# Patient Record
Sex: MALE | Race: WHITE | NOT HISPANIC OR LATINO | Employment: OTHER | URBAN - METROPOLITAN AREA
[De-identification: names, ages, dates, MRNs, and addresses within clinical notes are randomized per-mention and may not be internally consistent; named-entity substitution may affect disease eponyms.]

---

## 2023-03-02 LAB
ALANINE AMINOTRANSFERASE (SGPT) (U/L) IN SER/PLAS: 26 U/L (ref 10–52)
ALBUMIN (G/DL) IN SER/PLAS: 4.1 G/DL (ref 3.4–5)
ALBUMIN (MG/L) IN URINE: 80.2 MG/L
ALBUMIN/CREATININE (UG/MG) IN URINE: 80.7 UG/MG CRT (ref 0–30)
ALKALINE PHOSPHATASE (U/L) IN SER/PLAS: 74 U/L (ref 33–136)
ANION GAP IN SER/PLAS: 13 MMOL/L (ref 10–20)
ASPARTATE AMINOTRANSFERASE (SGOT) (U/L) IN SER/PLAS: 29 U/L (ref 9–39)
BILIRUBIN TOTAL (MG/DL) IN SER/PLAS: 0.3 MG/DL (ref 0–1.2)
CALCIUM (MG/DL) IN SER/PLAS: 9.6 MG/DL (ref 8.6–10.6)
CARBON DIOXIDE, TOTAL (MMOL/L) IN SER/PLAS: 23 MMOL/L (ref 21–32)
CHLORIDE (MMOL/L) IN SER/PLAS: 108 MMOL/L (ref 98–107)
CHOLESTEROL (MG/DL) IN SER/PLAS: 132 MG/DL (ref 0–199)
CHOLESTEROL IN HDL (MG/DL) IN SER/PLAS: 45.6 MG/DL
CHOLESTEROL/HDL RATIO: 2.9
CREATININE (MG/DL) IN SER/PLAS: 1.12 MG/DL (ref 0.5–1.3)
CREATININE (MG/DL) IN URINE: 99.4 MG/DL (ref 20–370)
GFR MALE: 71 ML/MIN/1.73M2
GLUCOSE (MG/DL) IN SER/PLAS: 99 MG/DL (ref 74–99)
LDL: 69 MG/DL (ref 0–99)
POTASSIUM (MMOL/L) IN SER/PLAS: 4.1 MMOL/L (ref 3.5–5.3)
PROTEIN TOTAL: 6.9 G/DL (ref 6.4–8.2)
SODIUM (MMOL/L) IN SER/PLAS: 140 MMOL/L (ref 136–145)
THYROTROPIN (MIU/L) IN SER/PLAS BY DETECTION LIMIT <= 0.05 MIU/L: 2.84 MIU/L (ref 0.44–3.98)
TRIGLYCERIDE (MG/DL) IN SER/PLAS: 89 MG/DL (ref 0–149)
UREA NITROGEN (MG/DL) IN SER/PLAS: 18 MG/DL (ref 6–23)
VLDL: 18 MG/DL (ref 0–40)

## 2023-03-28 ENCOUNTER — TELEPHONE (OUTPATIENT)
Dept: PRIMARY CARE | Facility: CLINIC | Age: 68
End: 2023-03-28
Payer: MEDICARE

## 2023-04-24 PROBLEM — L57.0 ACTINIC KERATOSIS OF SCALP: Status: RESOLVED | Noted: 2023-04-24 | Resolved: 2023-04-24

## 2023-04-24 PROBLEM — K21.9 GERD (GASTROESOPHAGEAL REFLUX DISEASE): Status: ACTIVE | Noted: 2023-04-24

## 2023-04-24 PROBLEM — R05.9 COUGH: Status: RESOLVED | Noted: 2023-04-24 | Resolved: 2023-04-24

## 2023-04-24 PROBLEM — M54.16 LUMBAR RADICULOPATHY: Status: ACTIVE | Noted: 2023-04-24

## 2023-04-24 PROBLEM — I20.89 ATYPICAL ANGINA (CMS-HCC): Status: RESOLVED | Noted: 2023-04-24 | Resolved: 2023-04-24

## 2023-04-24 PROBLEM — E11.9 DIABETES (MULTI): Status: ACTIVE | Noted: 2023-04-24

## 2023-04-24 PROBLEM — R19.8 IRREGULAR BOWEL HABITS: Status: ACTIVE | Noted: 2023-04-24

## 2023-04-24 PROBLEM — M54.12 CERVICAL RADICULOPATHY, CHRONIC: Status: ACTIVE | Noted: 2023-04-24

## 2023-04-24 PROBLEM — M17.9 OSTEOARTHRITIS OF KNEE: Status: ACTIVE | Noted: 2023-04-24

## 2023-04-24 PROBLEM — R19.4 CHANGE IN BOWEL HABITS: Status: ACTIVE | Noted: 2023-04-24

## 2023-04-24 PROBLEM — H66.009 ACUTE SUPPURATIVE OTITIS MEDIA: Status: RESOLVED | Noted: 2023-04-24 | Resolved: 2023-04-24

## 2023-04-24 PROBLEM — M25.569 KNEE PAIN: Status: RESOLVED | Noted: 2023-04-24 | Resolved: 2023-04-24

## 2023-04-24 PROBLEM — K63.5 COLON POLYP: Status: ACTIVE | Noted: 2023-04-24

## 2023-04-24 PROBLEM — M54.31 SCIATICA OF RIGHT SIDE: Status: ACTIVE | Noted: 2023-04-24

## 2023-04-24 PROBLEM — M25.50 JOINT PAIN: Status: RESOLVED | Noted: 2023-04-24 | Resolved: 2023-04-24

## 2023-04-24 PROBLEM — M54.2 NECK PAIN: Status: RESOLVED | Noted: 2023-04-24 | Resolved: 2023-04-24

## 2023-04-24 PROBLEM — R10.9 ABDOMINAL CRAMPING: Status: RESOLVED | Noted: 2023-04-24 | Resolved: 2023-04-24

## 2023-04-24 PROBLEM — R15.9 FECAL INCONTINENCE: Status: ACTIVE | Noted: 2023-04-24

## 2023-04-24 PROBLEM — E78.5 HYPERLIPIDEMIA: Status: ACTIVE | Noted: 2023-04-24

## 2023-04-24 PROBLEM — R91.1 LUNG NODULE: Status: ACTIVE | Noted: 2023-04-24

## 2023-04-24 PROBLEM — M54.50 LOW BACK PAIN: Status: RESOLVED | Noted: 2023-04-24 | Resolved: 2023-04-24

## 2023-04-24 PROBLEM — R19.8 ANAL DISCHARGE: Status: RESOLVED | Noted: 2023-04-24 | Resolved: 2023-04-24

## 2023-04-24 PROBLEM — R79.89 ELEVATED TSH: Status: ACTIVE | Noted: 2023-04-24

## 2023-04-24 PROBLEM — M25.561 ARTHRALGIA OF RIGHT KNEE: Status: RESOLVED | Noted: 2023-04-24 | Resolved: 2023-04-24

## 2023-04-24 PROBLEM — M25.559 HIP PAIN, ACUTE: Status: RESOLVED | Noted: 2023-04-24 | Resolved: 2023-04-24

## 2023-04-24 RX ORDER — INSULIN LISPRO 100 [IU]/ML
INJECTION, SOLUTION INTRAVENOUS; SUBCUTANEOUS
COMMUNITY
End: 2023-08-29 | Stop reason: ALTCHOICE

## 2023-05-02 ENCOUNTER — HOSPITAL ENCOUNTER (OUTPATIENT)
Dept: DATA CONVERSION | Facility: HOSPITAL | Age: 68
End: 2023-05-02
Attending: PAIN MEDICINE | Admitting: PAIN MEDICINE
Payer: MEDICARE

## 2023-05-02 DIAGNOSIS — M54.12 RADICULOPATHY, CERVICAL REGION: ICD-10-CM

## 2023-06-01 LAB
ALBUMIN (G/DL) IN SER/PLAS: 4 G/DL (ref 3.4–5)
ANION GAP IN SER/PLAS: 14 MMOL/L (ref 10–20)
CALCIUM (MG/DL) IN SER/PLAS: 9.1 MG/DL (ref 8.6–10.3)
CARBON DIOXIDE, TOTAL (MMOL/L) IN SER/PLAS: 21 MMOL/L (ref 21–32)
CHLORIDE (MMOL/L) IN SER/PLAS: 108 MMOL/L (ref 98–107)
CREATININE (MG/DL) IN SER/PLAS: 1.46 MG/DL (ref 0.5–1.3)
GFR MALE: 51 ML/MIN/1.73M2
GLUCOSE (MG/DL) IN SER/PLAS: 220 MG/DL (ref 74–99)
PHOSPHATE (MG/DL) IN SER/PLAS: 4 MG/DL (ref 2.5–4.9)
POTASSIUM (MMOL/L) IN SER/PLAS: 4.2 MMOL/L (ref 3.5–5.3)
SODIUM (MMOL/L) IN SER/PLAS: 139 MMOL/L (ref 136–145)
UREA NITROGEN (MG/DL) IN SER/PLAS: 29 MG/DL (ref 6–23)

## 2023-07-03 DIAGNOSIS — K21.9 GASTROESOPHAGEAL REFLUX DISEASE, UNSPECIFIED WHETHER ESOPHAGITIS PRESENT: ICD-10-CM

## 2023-07-03 RX ORDER — CIMETIDINE 800 MG/1
TABLET, FILM COATED ORAL
Qty: 90 TABLET | Refills: 3 | Status: SHIPPED | OUTPATIENT
Start: 2023-07-03 | End: 2024-03-19 | Stop reason: SDUPTHER

## 2023-07-05 LAB
ALBUMIN (G/DL) IN SER/PLAS: 4.2 G/DL (ref 3.4–5)
ANION GAP IN SER/PLAS: 14 MMOL/L (ref 10–20)
CALCIUM (MG/DL) IN SER/PLAS: 9.2 MG/DL (ref 8.6–10.3)
CARBON DIOXIDE, TOTAL (MMOL/L) IN SER/PLAS: 19 MMOL/L (ref 21–32)
CHLORIDE (MMOL/L) IN SER/PLAS: 108 MMOL/L (ref 98–107)
CREATININE (MG/DL) IN SER/PLAS: 1.44 MG/DL (ref 0.5–1.3)
GFR MALE: 52 ML/MIN/1.73M2
GLUCOSE (MG/DL) IN SER/PLAS: 219 MG/DL (ref 74–99)
PHOSPHATE (MG/DL) IN SER/PLAS: 3.4 MG/DL (ref 2.5–4.9)
POTASSIUM (MMOL/L) IN SER/PLAS: 4.2 MMOL/L (ref 3.5–5.3)
SODIUM (MMOL/L) IN SER/PLAS: 137 MMOL/L (ref 136–145)
UREA NITROGEN (MG/DL) IN SER/PLAS: 32 MG/DL (ref 6–23)

## 2023-07-17 RX ORDER — CANDESARTAN 16 MG/1
1 TABLET ORAL DAILY
COMMUNITY
Start: 2018-10-08 | End: 2023-08-10

## 2023-07-17 RX ORDER — EMPAGLIFLOZIN 10 MG/1
1 TABLET, FILM COATED ORAL DAILY
COMMUNITY
Start: 2023-05-02 | End: 2023-10-11

## 2023-07-17 RX ORDER — PEN NEEDLE, DIABETIC 31 GX5/16"
NEEDLE, DISPOSABLE MISCELLANEOUS
COMMUNITY
Start: 2023-06-08 | End: 2023-09-07

## 2023-07-17 RX ORDER — SOLIFENACIN SUCCINATE 5 MG/1
1 TABLET, FILM COATED ORAL DAILY
COMMUNITY
Start: 2018-10-08 | End: 2023-09-25

## 2023-07-17 RX ORDER — METFORMIN HYDROCHLORIDE 1000 MG/1
1 TABLET ORAL EVERY 12 HOURS
COMMUNITY
Start: 2019-01-11 | End: 2023-11-08

## 2023-07-17 RX ORDER — SIMVASTATIN 10 MG/1
1 TABLET, FILM COATED ORAL DAILY
COMMUNITY
Start: 2019-09-11 | End: 2023-10-03

## 2023-07-18 DIAGNOSIS — G47.00 INSOMNIA, UNSPECIFIED TYPE: Primary | ICD-10-CM

## 2023-07-18 RX ORDER — AMITRIPTYLINE HYDROCHLORIDE 25 MG/1
25 TABLET, FILM COATED ORAL NIGHTLY
Qty: 90 TABLET | Refills: 3 | Status: SHIPPED | OUTPATIENT
Start: 2023-07-18 | End: 2024-03-19 | Stop reason: SDUPTHER

## 2023-08-10 LAB
ALANINE AMINOTRANSFERASE (SGPT) (U/L) IN SER/PLAS: 20 U/L (ref 10–52)
ALBUMIN (G/DL) IN SER/PLAS: 4 G/DL (ref 3.4–5)
ALKALINE PHOSPHATASE (U/L) IN SER/PLAS: 63 U/L (ref 33–136)
ANION GAP IN SER/PLAS: 13 MMOL/L (ref 10–20)
ASPARTATE AMINOTRANSFERASE (SGOT) (U/L) IN SER/PLAS: 26 U/L (ref 9–39)
BILIRUBIN TOTAL (MG/DL) IN SER/PLAS: 0.3 MG/DL (ref 0–1.2)
CALCIUM (MG/DL) IN SER/PLAS: 9.2 MG/DL (ref 8.6–10.3)
CARBON DIOXIDE, TOTAL (MMOL/L) IN SER/PLAS: 25 MMOL/L (ref 21–32)
CHLORIDE (MMOL/L) IN SER/PLAS: 107 MMOL/L (ref 98–107)
CHOLESTEROL (MG/DL) IN SER/PLAS: 127 MG/DL (ref 0–199)
CHOLESTEROL IN HDL (MG/DL) IN SER/PLAS: 37.8 MG/DL
CHOLESTEROL/HDL RATIO: 3.4
CREATININE (MG/DL) IN SER/PLAS: 1.47 MG/DL (ref 0.5–1.3)
ESTIMATED AVERAGE GLUCOSE FOR HBA1C: 174 MG/DL
GFR MALE: 51 ML/MIN/1.73M2
GLUCOSE (MG/DL) IN SER/PLAS: 100 MG/DL (ref 74–99)
HEMOGLOBIN A1C/HEMOGLOBIN TOTAL IN BLOOD: 7.7 %
LDL: 68 MG/DL (ref 0–99)
PHOSPHATE (MG/DL) IN SER/PLAS: 3.4 MG/DL (ref 2.5–4.9)
POTASSIUM (MMOL/L) IN SER/PLAS: 4.2 MMOL/L (ref 3.5–5.3)
PROTEIN TOTAL: 7 G/DL (ref 6.4–8.2)
SODIUM (MMOL/L) IN SER/PLAS: 141 MMOL/L (ref 136–145)
THYROTROPIN (MIU/L) IN SER/PLAS BY DETECTION LIMIT <= 0.05 MIU/L: 3.27 MIU/L (ref 0.44–3.98)
TRIGLYCERIDE (MG/DL) IN SER/PLAS: 108 MG/DL (ref 0–149)
UREA NITROGEN (MG/DL) IN SER/PLAS: 24 MG/DL (ref 6–23)
VLDL: 22 MG/DL (ref 0–40)

## 2023-08-24 ENCOUNTER — HOSPITAL ENCOUNTER (OUTPATIENT)
Dept: DATA CONVERSION | Facility: HOSPITAL | Age: 68
End: 2023-08-24
Attending: PAIN MEDICINE | Admitting: PAIN MEDICINE
Payer: MEDICARE

## 2023-08-24 DIAGNOSIS — M54.12 RADICULOPATHY, CERVICAL REGION: ICD-10-CM

## 2023-08-25 PROBLEM — D18.01 HEMANGIOMA OF SKIN AND SUBCUTANEOUS TISSUE: Status: ACTIVE | Noted: 2023-05-24

## 2023-08-25 PROBLEM — D22.5 MELANOCYTIC NEVI OF TRUNK: Status: ACTIVE | Noted: 2023-05-24

## 2023-08-25 PROBLEM — C44.519 BASAL CELL CARCINOMA OF SKIN OF OTHER PART OF TRUNK: Status: ACTIVE | Noted: 2023-05-24

## 2023-08-25 PROBLEM — L81.4 OTHER MELANIN HYPERPIGMENTATION: Status: ACTIVE | Noted: 2023-05-24

## 2023-08-25 PROBLEM — D48.5 NEOPLASM OF UNCERTAIN BEHAVIOR OF SKIN: Status: ACTIVE | Noted: 2023-05-24

## 2023-08-25 PROBLEM — L91.8 OTHER HYPERTROPHIC DISORDERS OF THE SKIN: Status: ACTIVE | Noted: 2023-05-24

## 2023-08-25 RX ORDER — BLOOD-GLUCOSE METER
KIT MISCELLANEOUS
COMMUNITY
Start: 2019-07-29 | End: 2024-03-12 | Stop reason: ENTERED-IN-ERROR

## 2023-08-29 ENCOUNTER — LAB (OUTPATIENT)
Dept: LAB | Facility: LAB | Age: 68
End: 2023-08-29
Payer: MEDICARE

## 2023-08-29 ENCOUNTER — OFFICE VISIT (OUTPATIENT)
Dept: PRIMARY CARE | Facility: CLINIC | Age: 68
End: 2023-08-29
Payer: MEDICARE

## 2023-08-29 VITALS
HEIGHT: 66 IN | RESPIRATION RATE: 17 BRPM | DIASTOLIC BLOOD PRESSURE: 82 MMHG | WEIGHT: 201 LBS | BODY MASS INDEX: 32.3 KG/M2 | TEMPERATURE: 97.5 F | HEART RATE: 76 BPM | SYSTOLIC BLOOD PRESSURE: 140 MMHG

## 2023-08-29 DIAGNOSIS — Q60.0 CONGENITAL ABSENCE OF RIGHT KIDNEY: ICD-10-CM

## 2023-08-29 DIAGNOSIS — E11.9 TYPE 2 DIABETES MELLITUS WITHOUT COMPLICATION, WITHOUT LONG-TERM CURRENT USE OF INSULIN (MULTI): ICD-10-CM

## 2023-08-29 DIAGNOSIS — Z00.00 ROUTINE GENERAL MEDICAL EXAMINATION AT A HEALTH CARE FACILITY: ICD-10-CM

## 2023-08-29 DIAGNOSIS — Z12.5 PROSTATE CANCER SCREENING: ICD-10-CM

## 2023-08-29 DIAGNOSIS — Z00.00 MEDICARE ANNUAL WELLNESS VISIT, SUBSEQUENT: Primary | ICD-10-CM

## 2023-08-29 LAB — PROSTATE SPECIFIC AG (NG/ML) IN SER/PLAS: 0.63 NG/ML (ref 0–4)

## 2023-08-29 PROCEDURE — 90677 PCV20 VACCINE IM: CPT | Performed by: FAMILY MEDICINE

## 2023-08-29 PROCEDURE — 36415 COLL VENOUS BLD VENIPUNCTURE: CPT

## 2023-08-29 PROCEDURE — 99214 OFFICE O/P EST MOD 30 MIN: CPT | Performed by: FAMILY MEDICINE

## 2023-08-29 PROCEDURE — G0009 ADMIN PNEUMOCOCCAL VACCINE: HCPCS | Performed by: FAMILY MEDICINE

## 2023-08-29 PROCEDURE — G0103 PSA SCREENING: HCPCS

## 2023-08-29 PROCEDURE — G0439 PPPS, SUBSEQ VISIT: HCPCS | Performed by: FAMILY MEDICINE

## 2023-08-29 ASSESSMENT — ENCOUNTER SYMPTOMS
CARDIOVASCULAR NEGATIVE: 1
CONSTITUTIONAL NEGATIVE: 1
NEUROLOGICAL NEGATIVE: 1
EYES NEGATIVE: 1
MUSCULOSKELETAL NEGATIVE: 1
HEMATOLOGIC/LYMPHATIC NEGATIVE: 1
ALLERGIC/IMMUNOLOGIC NEGATIVE: 1
GASTROINTESTINAL NEGATIVE: 1
ENDOCRINE NEGATIVE: 1
RESPIRATORY NEGATIVE: 1
PSYCHIATRIC NEGATIVE: 1

## 2023-08-29 ASSESSMENT — ACTIVITIES OF DAILY LIVING (ADL)
TAKING_MEDICATION: INDEPENDENT
DOING_HOUSEWORK: INDEPENDENT
MANAGING_FINANCES: INDEPENDENT
BATHING: INDEPENDENT
GROCERY_SHOPPING: INDEPENDENT
DRESSING: INDEPENDENT

## 2023-08-29 ASSESSMENT — PATIENT HEALTH QUESTIONNAIRE - PHQ9
1. LITTLE INTEREST OR PLEASURE IN DOING THINGS: NOT AT ALL
2. FEELING DOWN, DEPRESSED OR HOPELESS: NOT AT ALL
SUM OF ALL RESPONSES TO PHQ9 QUESTIONS 1 AND 2: 0

## 2023-08-29 NOTE — PROGRESS NOTES
"Subjective   Reason for Visit: Dalton Champion is an 68 y.o. male here for a Medicare Wellness visit.     Past Medical, Surgical, and Family History reviewed and updated in chart.    Reviewed all medications by prescribing practitioner or clinical pharmacist (such as prescriptions, OTCs, herbal therapies and supplements) and documented in the medical record.    HPI    Patient Care Team:  Remy Pisano DO as PCP - General  Remy Pisano DO [facility] PCP [facility] Attributed Provider     Review of Systems    Objective   Vitals:  /82   Pulse 76   Temp 36.4 °C (97.5 °F)   Resp 17   Ht 1.676 m (5' 6\")   Wt 91.2 kg (201 lb)   BMI 32.44 kg/m²       Physical Exam    Assessment/Plan   Problem List Items Addressed This Visit       Congenital absence of right kidney     Other Visit Diagnoses       Prostate cancer screening    -  Primary    Relevant Orders    Prostate Specific Antigen               "

## 2023-08-29 NOTE — PROGRESS NOTES
Subjective   Patient ID: Dalton Champion is a 68 y.o. male who presents for Med Refill (6 month med check. Pt states he has been doing well. 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 ).  HPI    Review of Systems   Constitutional: Negative.    HENT: Negative.     Eyes: Negative.    Respiratory: Negative.     Cardiovascular: Negative.    Gastrointestinal: Negative.    Endocrine: Negative.    Genitourinary: Negative.    Musculoskeletal: Negative.    Skin: Negative.    Allergic/Immunologic: Negative.    Neurological: Negative.    Hematological: Negative.    Psychiatric/Behavioral: Negative.         Objective   Physical Exam  Vitals and nursing note reviewed.   Constitutional:       Appearance: Normal appearance.   HENT:      Head: Normocephalic and atraumatic.      Nose: Nose normal.      Mouth/Throat:      Pharynx: Oropharynx is clear.   Eyes:      Extraocular Movements: Extraocular movements intact.      Conjunctiva/sclera: Conjunctivae normal.      Pupils: Pupils are equal, round, and reactive to light.   Neck:      Vascular: No carotid bruit.   Cardiovascular:      Rate and Rhythm: Normal rate and regular rhythm.      Pulses: Normal pulses.      Heart sounds: Normal heart sounds.   Pulmonary:      Effort: Pulmonary effort is normal. No respiratory distress.      Breath sounds: Normal breath sounds. No wheezing, rhonchi or rales.   Abdominal:      General: Abdomen is flat. Bowel sounds are normal. There is no distension.      Palpations: Abdomen is soft.      Tenderness: There is no abdominal tenderness.      Hernia: No hernia is present.   Musculoskeletal:         General: No swelling or tenderness. Normal range of motion.      Cervical back: Normal range of motion and neck supple. No tenderness.   Lymphadenopathy:      Cervical: No cervical adenopathy.   Skin:     General: Skin is warm and dry.      Capillary Refill: Capillary refill takes less than 2 seconds.    Neurological:      General: No focal deficit present.      Mental Status: He is alert and oriented to person, place, and time.      Cranial Nerves: No cranial nerve deficit.   Psychiatric:         Attention and Perception: Attention and perception normal.         Mood and Affect: Mood normal.         Behavior: Behavior normal.         Thought Content: Thought content normal.         Judgment: Judgment normal.         Assessment/Plan   Problem List Items Addressed This Visit       Diabetes (CMS/[facility])     [facility] reviewed and follow up yearly         Congenital absence of right kidney     Other Visit Diagnoses       Medicare annual wellness visit, subsequent    -  Primary    Prostate cancer screening        Relevant Orders    Prostate Specific Antigen (Completed)    Routine general medical examination at a health care facility        Relevant Orders    Pneumococcal conjugate vaccine, 20-valent, adult (PREVNAR 20) (Completed)

## 2023-08-30 ENCOUNTER — TELEPHONE (OUTPATIENT)
Dept: PRIMARY CARE | Facility: CLINIC | Age: 68
End: 2023-08-30
Payer: MEDICARE

## 2023-09-14 PROBLEM — Z79.4 LONG-TERM INSULIN USE (MULTI): Status: ACTIVE | Noted: 2023-09-14

## 2023-09-14 RX ORDER — PANTOPRAZOLE SODIUM 40 MG/1
40 TABLET, DELAYED RELEASE ORAL 2 TIMES DAILY
COMMUNITY
Start: 2015-12-22 | End: 2023-10-17 | Stop reason: ALTCHOICE

## 2023-09-14 RX ORDER — SILDENAFIL 25 MG/1
TABLET, FILM COATED ORAL
COMMUNITY
End: 2023-10-17 | Stop reason: ALTCHOICE

## 2023-09-14 RX ORDER — LANCETS
EACH MISCELLANEOUS
COMMUNITY
Start: 2021-03-30 | End: 2024-03-12 | Stop reason: ENTERED-IN-ERROR

## 2023-09-14 RX ORDER — LIRAGLUTIDE 6 MG/ML
INJECTION SUBCUTANEOUS
COMMUNITY
End: 2023-10-17 | Stop reason: ALTCHOICE

## 2023-09-14 RX ORDER — INSULIN LISPRO 100 [IU]/ML
INJECTION, SUSPENSION SUBCUTANEOUS
COMMUNITY
End: 2023-10-17 | Stop reason: ALTCHOICE

## 2023-09-14 RX ORDER — GABAPENTIN 300 MG/1
300 CAPSULE ORAL 3 TIMES DAILY
COMMUNITY
Start: 2022-09-27 | End: 2023-10-17 | Stop reason: ALTCHOICE

## 2023-10-01 NOTE — OP NOTE
PROCEDURE DETAILS      Postoperative Diagnosis:  cervical radiculopathy   Surgeon: Donta Mckeon  Resident/Fellow/Other Assistant: Rama Killian    Procedure:  Cervical epidural steroid injection under fluoroscopic guidance   Anesthesia: No anesthesiologist associated with this case  Estimated Blood Loss: 0  Findings: None         Operative Report:           Level performed  C7-T1    Operation  Cervical epidural steroid injection.    Surgical indications  The patient is here today to receive a cervical epidural steroid injection to assist with pain.    Operative report  The patient was taken to the procedure room, was placed in a prone positioning. The neck area was prepped and draped sterilely in the normal fashion under fluoroscopic guidance. The above-mentioned space was identified. The skin and subcutaneous tissue  was anesthetized 1% lidocaine. An 18-gauge Tuohy needle was introduced using the normal saline loss-of-resistance technique. Once the loss of resistance had been achieved, the final positioning of the needle was confirmed with negative aspiration of heme  and CSF, and with contrast material injection. Then, the patient received 80 mg of Depo-Medrol diluted into normal saline preservative-free making a total volume of 5 mL. The patient tolerated the procedure well, was taken to the recovery room, allowed  to recover for sufficient amount of time and then was discharged home in stable condition. The patient was advised to [facility] to reassess the improvement and determine the need for repeating injection on an as-needed basis.    Thank you for allowing us to participate in the care of this patient.                        Attestation:   Note Completion:  Attending Attestation I performed the procedure without a resident         Electronic Signatures:  Donta Mckeon)  (Signed 24-Aug-2023 08:36)   Authored: Post-Operative Note, Chart Review, Note  Completion      Last Updated: 24-Aug-2023 08:36 by Donta Mckeon)

## 2023-10-02 NOTE — OP NOTE
PROCEDURE DETAILS    Preoperative Diagnosis:  Cervical radicular pain, M54.12    Postoperative Diagnosis:  Cervical radicular pain, M54.12    Surgeon: Donta Mckeon  Resident/Fellow/Other Assistant: Rama Killian    Procedure:  Cervical epidural steroid injection under fluoroscopic guidance     Anesthesia: No anesthesiologist associated with this case  Estimated Blood Loss: 0  Findings: None         Operative Report:           Level performed  C7-T1    Operation  Cervical epidural steroid injection.    Surgical indications  The patient is here today to receive a cervical epidural steroid injection to assist with pain.    Operative report  The patient was taken to the procedure room, was placed in a prone positioning. The neck area was prepped and draped sterilely in the normal fashion under fluoroscopic guidance. The above-mentioned space was identified. The skin and subcutaneous tissue  was anesthetized 1% lidocaine. An 18-gauge Tuohy needle was introduced using the normal saline loss-of-resistance technique. Once the loss of resistance had been achieved, the final positioning of the needle was confirmed with negative aspiration of heme  and CSF, and with contrast material injection. Then, the patient received 80 mg of Depo-Medrol diluted into normal saline preservative-free making a total volume of 5 mL. The patient tolerated the procedure well, was taken to the recovery room, allowed  to recover for sufficient amount of time and then was discharged home in stable condition. The patient was advised to followup [facility] to reassess the improvement and determine the need for repeating injection on an as-needed basis.    Thank you for allowing us to participate in the care of this patient.                        Attestation:   Note Completion:  Attending Attestation I performed the procedure without a resident         Electronic Signatures:  Donta Mckeon)  (Signed 02-May-2023  08:42)   Authored: Post-Operative Note, Chart Review, Note Completion      Last Updated: 02-May-2023 08:42 by Donta Mckeon)

## 2023-10-11 DIAGNOSIS — E11.9 TYPE 2 DIABETES MELLITUS WITHOUT COMPLICATION, WITHOUT LONG-TERM CURRENT USE OF INSULIN (MULTI): Primary | ICD-10-CM

## 2023-10-11 RX ORDER — EMPAGLIFLOZIN 10 MG/1
10 TABLET, FILM COATED ORAL DAILY
Qty: 90 TABLET | Refills: 3 | Status: SHIPPED | OUTPATIENT
Start: 2023-10-11

## 2023-10-17 ENCOUNTER — OFFICE VISIT (OUTPATIENT)
Dept: ENDOCRINOLOGY | Facility: CLINIC | Age: 68
End: 2023-10-17
Payer: MEDICARE

## 2023-10-17 ENCOUNTER — LAB (OUTPATIENT)
Dept: LAB | Facility: LAB | Age: 68
End: 2023-10-17
Payer: MEDICARE

## 2023-10-17 VITALS
SYSTOLIC BLOOD PRESSURE: 128 MMHG | BODY MASS INDEX: 31.37 KG/M2 | HEIGHT: 68 IN | DIASTOLIC BLOOD PRESSURE: 74 MMHG | WEIGHT: 207 LBS

## 2023-10-17 DIAGNOSIS — Z79.4 TYPE 2 DIABETES MELLITUS WITH OTHER SPECIFIED COMPLICATION, WITH LONG-TERM CURRENT USE OF INSULIN (MULTI): ICD-10-CM

## 2023-10-17 DIAGNOSIS — E11.69 TYPE 2 DIABETES MELLITUS WITH OTHER SPECIFIED COMPLICATION, WITH LONG-TERM CURRENT USE OF INSULIN (MULTI): ICD-10-CM

## 2023-10-17 DIAGNOSIS — E55.9 VITAMIN D DEFICIENCY: ICD-10-CM

## 2023-10-17 DIAGNOSIS — E11.69 TYPE 2 DIABETES MELLITUS WITH OTHER SPECIFIED COMPLICATION, WITH LONG-TERM CURRENT USE OF INSULIN (MULTI): Primary | ICD-10-CM

## 2023-10-17 DIAGNOSIS — Z79.4 TYPE 2 DIABETES MELLITUS WITH OTHER SPECIFIED COMPLICATION, WITH LONG-TERM CURRENT USE OF INSULIN (MULTI): Primary | ICD-10-CM

## 2023-10-17 LAB
25(OH)D3 SERPL-MCNC: 24 NG/ML (ref 30–100)
ALBUMIN SERPL BCP-MCNC: 4 G/DL (ref 3.4–5)
ALP SERPL-CCNC: 61 U/L (ref 33–136)
ALT SERPL W P-5'-P-CCNC: 22 U/L (ref 10–52)
ANION GAP SERPL CALC-SCNC: 12 MMOL/L (ref 10–20)
AST SERPL W P-5'-P-CCNC: 25 U/L (ref 9–39)
BILIRUB SERPL-MCNC: 0.4 MG/DL (ref 0–1.2)
BUN SERPL-MCNC: 30 MG/DL (ref 6–23)
CALCIUM SERPL-MCNC: 9 MG/DL (ref 8.6–10.3)
CHLORIDE SERPL-SCNC: 105 MMOL/L (ref 98–107)
CO2 SERPL-SCNC: 25 MMOL/L (ref 21–32)
CREAT SERPL-MCNC: 1.46 MG/DL (ref 0.5–1.3)
GFR SERPL CREATININE-BSD FRML MDRD: 51 ML/MIN/1.73M*2
GLUCOSE SERPL-MCNC: 65 MG/DL (ref 74–99)
POC FINGERSTICK BLOOD GLUCOSE: 106 MG/DL (ref 70–100)
POC HEMOGLOBIN A1C: 7.8 % (ref 4.2–6.5)
POTASSIUM SERPL-SCNC: 4.6 MMOL/L (ref 3.5–5.3)
PROT SERPL-MCNC: 6.5 G/DL (ref 6.4–8.2)
SODIUM SERPL-SCNC: 137 MMOL/L (ref 136–145)

## 2023-10-17 PROCEDURE — 83036 HEMOGLOBIN GLYCOSYLATED A1C: CPT | Performed by: HOSPITALIST

## 2023-10-17 PROCEDURE — 99214 OFFICE O/P EST MOD 30 MIN: CPT | Performed by: HOSPITALIST

## 2023-10-17 PROCEDURE — 82962 GLUCOSE BLOOD TEST: CPT | Performed by: HOSPITALIST

## 2023-10-17 PROCEDURE — 3051F HG A1C>EQUAL 7.0%<8.0%: CPT | Performed by: HOSPITALIST

## 2023-10-17 PROCEDURE — 1036F TOBACCO NON-USER: CPT | Performed by: HOSPITALIST

## 2023-10-17 PROCEDURE — 82306 VITAMIN D 25 HYDROXY: CPT

## 2023-10-17 PROCEDURE — 3074F SYST BP LT 130 MM HG: CPT | Performed by: HOSPITALIST

## 2023-10-17 PROCEDURE — 36415 COLL VENOUS BLD VENIPUNCTURE: CPT

## 2023-10-17 PROCEDURE — 80053 COMPREHEN METABOLIC PANEL: CPT

## 2023-10-17 PROCEDURE — 3078F DIAST BP <80 MM HG: CPT | Performed by: HOSPITALIST

## 2023-10-17 PROCEDURE — 1125F AMNT PAIN NOTED PAIN PRSNT: CPT | Performed by: HOSPITALIST

## 2023-10-17 PROCEDURE — 4010F ACE/ARB THERAPY RXD/TAKEN: CPT | Performed by: HOSPITALIST

## 2023-10-17 PROCEDURE — 1159F MED LIST DOCD IN RCRD: CPT | Performed by: HOSPITALIST

## 2023-10-17 RX ORDER — BLOOD-GLUCOSE,RECEIVER,CONT
EACH MISCELLANEOUS
Qty: 1 EACH | Refills: 0 | Status: SHIPPED | OUTPATIENT
Start: 2023-10-17 | End: 2023-10-18 | Stop reason: SDUPTHER

## 2023-10-17 RX ORDER — BLOOD-GLUCOSE SENSOR
EACH MISCELLANEOUS
Qty: 6 EACH | Refills: 3 | Status: SHIPPED | OUTPATIENT
Start: 2023-10-17 | End: 2023-10-18 | Stop reason: SDUPTHER

## 2023-10-17 ASSESSMENT — ENCOUNTER SYMPTOMS
CHEST TIGHTNESS: 0
FREQUENCY: 0
SLEEP DISTURBANCE: 0
SORE THROAT: 0
PALPITATIONS: 0
AGITATION: 0
HEADACHES: 0
ARTHRALGIAS: 0
ABDOMINAL PAIN: 0
PHOTOPHOBIA: 0
ABDOMINAL DISTENTION: 0
SHORTNESS OF BREATH: 0
CONSTIPATION: 0
TROUBLE SWALLOWING: 0
NAUSEA: 0
CONSTITUTIONAL NEGATIVE: 1
LIGHT-HEADEDNESS: 0
NERVOUS/ANXIOUS: 0
VOICE CHANGE: 0
VOMITING: 0
TREMORS: 0
DIARRHEA: 0
EYE ITCHING: 0
DYSURIA: 0

## 2023-10-17 NOTE — PROGRESS NOTES
Subjective   Patient ID: Dalton Champion is a 68 y.o. male who presents for Diabetes (Dx DM: around 1999/[facility] DM: father, grandparent, brother/PCP: [facility] /Podiatry: does not see one /Patient testing glucose randomly.).  HPI     PLEASE SEE HPI DETAILS IN AP     Review of Systems   Constitutional: Negative.    HENT:  Negative for sore throat, trouble swallowing and voice change.    Eyes:  Negative for photophobia, itching and visual disturbance.   Respiratory:  Negative for chest tightness and shortness of breath.    Cardiovascular:  Negative for chest pain and palpitations.   Gastrointestinal:  Negative for abdominal distention, abdominal pain, constipation, diarrhea, nausea and vomiting.   Endocrine: Positive for polyuria. Negative for cold intolerance and heat intolerance.   Genitourinary:  Negative for dysuria and frequency.   Musculoskeletal:  Negative for arthralgias.   Skin:  Negative for pallor.   Allergic/Immunologic: Negative for environmental allergies.   Neurological:  Negative for tremors, light-headedness and headaches.        Leg cramps   Psychiatric/Behavioral:  Negative for agitation and sleep disturbance. The patient is not nervous/anxious.        Objective   Physical Exam  Constitutional:       Appearance: Normal appearance.   HENT:      Head: Normocephalic.      Comments: MM dry     Nose: Nose normal.      Mouth/Throat:      Mouth: Mucous membranes are moist.   Eyes:      Extraocular Movements: Extraocular movements intact.   Cardiovascular:      Rate and Rhythm: Normal rate.   Pulmonary:      Effort: Pulmonary effort is normal. No respiratory distress.   Abdominal:      General: There is no distension.   Musculoskeletal:         General: Normal range of motion.      Cervical back: Normal range of motion and neck supple.   Skin:     General: Skin is warm and dry.   Neurological:      Mental Status: He is alert and oriented to person, place, and time.   Psychiatric:         Mood and Affect: Mood  normal.         Assessment/Plan   Diagnoses and all orders for this visit:  Type 2 diabetes mellitus with other specified complication, with long-term current use of insulin (CMS/[facility])  -     POCT glucose manually resulted  -     POCT glycosylated hemoglobin (Hb A1C) manually resulted  -     Comprehensive metabolic panel; Future  -     Albumin , Urine (SPOT); Future  -     blood-glucose sensor (Dexcom G7 Sensor) device; CHANGE EVERY 10 DAYS  -     Dexcom G4 platinum  (Dexcom G7 ) misc; Use as instructed  Vitamin D deficiency  -     Vitamin D 25-Hydroxy,Total (for eval of Vitamin D levels); Future        T2DM , uncontrolled   Dx DM: around 1999    Current regimen:   pioglitzaone 15mg qd   metformin 1000mg BID  Humalog 75/25 30-0-30-0  ( dinner at 6 - takes at 6 : 30 pm , takes empty stomach in morning )    Jardiance 10 mg daily ( started may 2023 - has polyuria and dry mouth )       Misses bkfst, first snack / meal of the day      interval history : GC injection today    she does get occ GC injections in joints, nothing more planned in future   ,   did not bring meter. rarely chceks BG   Low BG symptoms yesterday evening before dinner. 2 times in last month        A1c improved to 7.8 % from 8.5 % and above goal    he mentions he has only one kidney functioning  he has constipation and heartburn for which he takes meds: GLP1 agonist may make it worse     PLAN :     Continue your JARDIANCE/ METFORMIN AND PIOGLITAZONE    INCREASE YOUR WATER INTAKE/ DECREASE YOUR COFFEE INTAKE   CARRY INSULIN WITH YOU WHEN OUTSIDE   TAKE INSULIN ONLY BEFORE EATING , AROUND 15 MIN BEFORE EATING   TAKE 28 UNITS BEFORE FIRST SNACK OR MEAL OF THE DAY AND 28 UNITS BEFORE DINNER   CHECK YOUR SUGAR AT LEAST TWO TIMES A DAY, GIVE 1 WEEK DATA BEFORE NEXT APPOINTMENT.   IF YOU GET STEROID INJECTION IN THE JOINT , TAKE INSULIN 35 UNITS BEFORE FIRST AND 35 UNIST BEFORE DINNER FOR 4 DAYS AFTER THE INJECTIONS.     DO BLOOD WORK TODAY       Discussed CGM dexcom G7 . Discussed MOA, will send to his pharmacy     bring glucometer next visit again   - DIscussed hypoglycemia and the Management of hypoglycemia.   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

## 2023-10-17 NOTE — PATIENT INSTRUCTIONS
Continue your JARDIANCE/ METFORMIN AND PIOGLITAZONE    INCREASE YOUR WATER INTAKE/ DECREASE YOUR COFFEE INTAKE   CARRY INSULIN WITH YOU WHEN OUTSIDE   TAKE INSULIN ONLY BEFORE EATING , AROUND 15 MIN BEFORE EATING   TAKE 28 UNITS BEFORE FIRST SNACK OR MEAL OF THE DAY AND 28 UNITS BEFORE DINNER   CHECK YOUR SUGAR AT LEAST TWO TIMES A DAY, GIVE 1 WEEK DATA BEFORE NEXT APPOINTMENT.   IF YOU GET STEROID INJECTION IN THE JOINT , TAKE INSULIN 35 UNITS BEFORE FIRST AND 35 UNIST BEFORE DINNER FOR 4 DAYS AFTER THE INJECTIONS.     DO BLOOD WORK TODAY

## 2023-10-18 DIAGNOSIS — Z79.4 TYPE 2 DIABETES MELLITUS WITH OTHER SPECIFIED COMPLICATION, WITH LONG-TERM CURRENT USE OF INSULIN (MULTI): ICD-10-CM

## 2023-10-18 DIAGNOSIS — E11.69 TYPE 2 DIABETES MELLITUS WITH OTHER SPECIFIED COMPLICATION, WITH LONG-TERM CURRENT USE OF INSULIN (MULTI): ICD-10-CM

## 2023-10-19 RX ORDER — BLOOD-GLUCOSE SENSOR
EACH MISCELLANEOUS
Qty: 6 EACH | Refills: 3 | Status: SHIPPED | OUTPATIENT
Start: 2023-10-19 | End: 2024-05-02 | Stop reason: DRUGHIGH

## 2023-10-19 RX ORDER — BLOOD-GLUCOSE,RECEIVER,CONT
EACH MISCELLANEOUS
Qty: 1 EACH | Refills: 0 | Status: SHIPPED | OUTPATIENT
Start: 2023-10-19 | End: 2024-05-02 | Stop reason: WASHOUT

## 2023-10-24 ENCOUNTER — TELEPHONE (OUTPATIENT)
Dept: PAIN MEDICINE | Facility: CLINIC | Age: 68
End: 2023-10-24
Payer: MEDICARE

## 2023-10-24 DIAGNOSIS — M54.12 RADICULOPATHY, CERVICAL: Primary | ICD-10-CM

## 2023-10-24 NOTE — TELEPHONE ENCOUNTER
Patient would like a repeat cervical epidural, can you let me know when you put the order in?  thanks

## 2023-11-02 ENCOUNTER — OFFICE VISIT (OUTPATIENT)
Dept: ORTHOPEDIC SURGERY | Facility: CLINIC | Age: 68
End: 2023-11-02
Payer: MEDICARE

## 2023-11-02 DIAGNOSIS — M17.11 PRIMARY OSTEOARTHRITIS OF RIGHT KNEE: Primary | ICD-10-CM

## 2023-11-02 PROCEDURE — 1036F TOBACCO NON-USER: CPT | Performed by: ORTHOPAEDIC SURGERY

## 2023-11-02 PROCEDURE — 4010F ACE/ARB THERAPY RXD/TAKEN: CPT | Performed by: ORTHOPAEDIC SURGERY

## 2023-11-02 PROCEDURE — 3051F HG A1C>EQUAL 7.0%<8.0%: CPT | Performed by: ORTHOPAEDIC SURGERY

## 2023-11-02 PROCEDURE — 3078F DIAST BP <80 MM HG: CPT | Performed by: ORTHOPAEDIC SURGERY

## 2023-11-02 PROCEDURE — 1125F AMNT PAIN NOTED PAIN PRSNT: CPT | Performed by: ORTHOPAEDIC SURGERY

## 2023-11-02 PROCEDURE — 99024 POSTOP FOLLOW-UP VISIT: CPT | Performed by: ORTHOPAEDIC SURGERY

## 2023-11-02 PROCEDURE — 3074F SYST BP LT 130 MM HG: CPT | Performed by: ORTHOPAEDIC SURGERY

## 2023-11-02 PROCEDURE — 20610 DRAIN/INJ JOINT/BURSA W/O US: CPT | Performed by: ORTHOPAEDIC SURGERY

## 2023-11-02 PROCEDURE — 1159F MED LIST DOCD IN RCRD: CPT | Performed by: ORTHOPAEDIC SURGERY

## 2023-11-02 RX ORDER — TRIAMCINOLONE ACETONIDE 40 MG/ML
40 INJECTION, SUSPENSION INTRA-ARTICULAR; INTRAMUSCULAR
Status: COMPLETED | OUTPATIENT
Start: 2023-11-02 | End: 2023-11-02

## 2023-11-02 RX ORDER — LIDOCAINE HYDROCHLORIDE 10 MG/ML
2 INJECTION INFILTRATION; PERINEURAL
Status: COMPLETED | OUTPATIENT
Start: 2023-11-02 | End: 2023-11-02

## 2023-11-02 RX ADMIN — TRIAMCINOLONE ACETONIDE 40 MG: 40 INJECTION, SUSPENSION INTRA-ARTICULAR; INTRAMUSCULAR at 10:51

## 2023-11-02 RX ADMIN — LIDOCAINE HYDROCHLORIDE 2 ML: 10 INJECTION INFILTRATION; PERINEURAL at 10:51

## 2023-11-02 NOTE — PROGRESS NOTES
History of Present Illness:  Patient returns today for an injection with corticosteroid. The patient endorsing knee pain refractory to activity modifications and oral medications.    Physical Examination:  Trace effusion  Tenderness over medial and lateral joint line    Assessment:  Patient with known osteoarthritis of the knee    Plan:  Corticosteroid injection provided, patient tolerated it well. See procedure note.    Injection performed as detailed in the procedure note below.     L Inj/Asp: R knee on 11/2/2023 10:51 AM  Indications: pain  Details: 22 G needle, anteromedial approach  Medications: 2 mL lidocaine 10 mg/mL (1 %); 40 mg triamcinolone acetonide 40 mg/mL  Outcome: tolerated well, no immediate complications  Procedure, treatment alternatives, risks and benefits explained, specific risks discussed. Consent was given by the patient. Immediately prior to procedure a time out was called to verify the correct patient, procedure, equipment, support staff and site/side marked as required. Patient was prepped and draped in the usual sterile fashion.

## 2023-11-08 DIAGNOSIS — Z79.4 TYPE 2 DIABETES MELLITUS WITH OTHER SPECIFIED COMPLICATION, WITH LONG-TERM CURRENT USE OF INSULIN (MULTI): Primary | ICD-10-CM

## 2023-11-08 DIAGNOSIS — E11.69 TYPE 2 DIABETES MELLITUS WITH OTHER SPECIFIED COMPLICATION, WITH LONG-TERM CURRENT USE OF INSULIN (MULTI): Primary | ICD-10-CM

## 2023-11-08 RX ORDER — METFORMIN HYDROCHLORIDE 1000 MG/1
1000 TABLET ORAL EVERY 12 HOURS
Qty: 180 TABLET | Refills: 3 | Status: SHIPPED | OUTPATIENT
Start: 2023-11-08

## 2023-11-30 ENCOUNTER — HOSPITAL ENCOUNTER (OUTPATIENT)
Dept: PAIN MEDICINE | Facility: CLINIC | Age: 68
Discharge: HOME | End: 2023-11-30
Payer: MEDICARE

## 2023-11-30 ENCOUNTER — HOSPITAL ENCOUNTER (OUTPATIENT)
Dept: RADIOLOGY | Facility: HOSPITAL | Age: 68
Discharge: HOME | End: 2023-11-30
Payer: MEDICARE

## 2023-11-30 VITALS
SYSTOLIC BLOOD PRESSURE: 185 MMHG | HEART RATE: 73 BPM | DIASTOLIC BLOOD PRESSURE: 87 MMHG | RESPIRATION RATE: 20 BRPM | TEMPERATURE: 95.6 F | OXYGEN SATURATION: 95 %

## 2023-11-30 DIAGNOSIS — M54.12 RADICULOPATHY, CERVICAL: ICD-10-CM

## 2023-11-30 PROCEDURE — 7100000010 HC PHASE TWO TIME - EACH INCREMENTAL 1 MINUTE: Performed by: PAIN MEDICINE

## 2023-11-30 PROCEDURE — 96372 THER/PROPH/DIAG INJ SC/IM: CPT | Performed by: PAIN MEDICINE

## 2023-11-30 PROCEDURE — 76000 FLUOROSCOPY <1 HR PHYS/QHP: CPT

## 2023-11-30 PROCEDURE — 7100000009 HC PHASE TWO TIME - INITIAL BASE CHARGE: Performed by: PAIN MEDICINE

## 2023-11-30 PROCEDURE — 94760 N-INVAS EAR/PLS OXIMETRY 1: CPT

## 2023-11-30 PROCEDURE — 62321 NJX INTERLAMINAR CRV/THRC: CPT | Performed by: PAIN MEDICINE

## 2023-11-30 PROCEDURE — 2500000004 HC RX 250 GENERAL PHARMACY W/ HCPCS (ALT 636 FOR OP/ED): Performed by: PAIN MEDICINE

## 2023-11-30 RX ORDER — METHYLPREDNISOLONE ACETATE 40 MG/ML
80 INJECTION, SUSPENSION INTRA-ARTICULAR; INTRALESIONAL; INTRAMUSCULAR; SOFT TISSUE ONCE
Status: COMPLETED | OUTPATIENT
Start: 2023-11-30 | End: 2023-11-30

## 2023-11-30 RX ADMIN — METHYLPREDNISOLONE ACETATE 80 MG: 40 INJECTION, SUSPENSION INTRA-ARTICULAR; INTRALESIONAL; INTRAMUSCULAR; SOFT TISSUE at 10:45

## 2023-11-30 ASSESSMENT — PAIN SCALES - GENERAL: PAINLEVEL_OUTOF10: 0 - NO PAIN

## 2023-11-30 ASSESSMENT — PAIN - FUNCTIONAL ASSESSMENT: PAIN_FUNCTIONAL_ASSESSMENT: 0-10

## 2023-11-30 ASSESSMENT — COLUMBIA-SUICIDE SEVERITY RATING SCALE - C-SSRS
1. IN THE PAST MONTH, HAVE YOU WISHED YOU WERE DEAD OR WISHED YOU COULD GO TO SLEEP AND NOT WAKE UP?: NO
6. HAVE YOU EVER DONE ANYTHING, STARTED TO DO ANYTHING, OR PREPARED TO DO ANYTHING TO END YOUR LIFE?: NO
2. HAVE YOU ACTUALLY HAD ANY THOUGHTS OF KILLING YOURSELF?: NO

## 2023-11-30 NOTE — DISCHARGE INSTRUCTIONS
Post-injection instructions:    Your pain may not be gone immediately after the procedure--it usually takes the steroid 3-5 days to start working.   It may take several weeks for the medicine to reach its' full effect.   Pay attention to how much pain relief (what percentage compared to before the procedure) you get and for how long it lasts.     Activity: Avoid strenuous activity for 24 hours. After that return to your normal activity level.     Bandages: Remove after 24 hours     Showering/Bathing: You may shower after bandage is removed     Follow up: CALL OFFICE IN 7 DAYS [phone] LEAVE MESSAGE ABOUT THE RELIEF THAT WAS OBTAINED      Call the doctor immediately: if you notice:     Excessive bleeding from procedure site (brisk bright red bleeding from the site or bleeding that soaks the bandages or does not stop)   Severe headache  Inability to walk, leg or arm weakness or numbness that is worse after the procedure   Uncontrolled pain   New urinary or fecal incontinence   Signs of infection: Fever above 101.5F, redness, swelling, pus or drainage from the site    Epidural Injection    Why is this procedure done?  With an epidural injection, the doctor injects drugs deep into the area around your spinal cord. This is different than epidural anesthesia that is used for surgery or when a woman has a baby. Your spine is a group of bones in your back that protect the nerves in your spinal cord. Problems with your spine can cause swollen nerves in the spinal cord. This swelling leads to pain and can limit movement. In an epidural injection, the doctor may give you a drug to help with swelling and pain.  You may have an epidural injection in different parts of your back, based on where your pain is. For pain in your head or arms, you may have a cervical epidural injection. If your pain is in your upper or middle back, you may get a thoracic epidural injection. For pain in your lower back or legs, you may get a  lumbar epidural injection.    What will the results be?  The treatment may:  Lower pain  Reduce swelling in the nerves  Improve movement  What happens before the procedure?  Your doctor will take your history. Talk to the doctor about:  All the drugs you are taking. Be sure to include all prescription and over-the-counter (OTC) drugs, and herbal supplements. Tell the doctor about any drug allergy. Bring a list of drugs you take with you.  If you have high blood sugar or diabetes. Your drugs may need to be changed.  Any bleeding problems. Be sure to tell your doctor if you are taking any drugs that may cause bleeding. Some of these are warfarin, rivaroxaban, apixaban, ticagrelor, clopidogrel, ketorolac, ibuprofen, naproxen, or aspirin. Certain vitamins and herbs, such as garlic and fish oil, may also add to the risk for bleeding. You may need to stop these drugs as well. Talk to your doctor about them.  Tell the doctor if you are pregnant.  You will not be allowed to drive right away after the procedure. Ask a family member or a friend to drive you home.  What happens during the procedure?  To help the doctor make sure the drugs are being injected in the right place, your doctor may do an x-ray of your spine. Other times your doctor may do a continuous x-ray during the procedure. This is a fluoroscopy. The doctor may also use a colored dye or a contrast dye to check where to inject the drug.  You may be given a drug to help you relax. You may be given a drug to make the area of the injection numb.  The doctor will clean the skin on your back or neck. This helps prevent infection.  The doctor will put a needle through the skin toward your spine. The drug will be injected into a space near the spine.  The needle will be taken out and a bandage will be placed over the injection site.  What happens after the procedure?  Staff will check on you to make sure you are doing well. They will tell you when you can go  home.  What care is needed at home?  Relax on the day of the injection.  Do not drive or run machines for at least 12 hours afterwards.  Apply ice to the injection site. Place an ice pack or a bag of frozen peas wrapped in a towel over the painful part. Never put ice right on the skin. Do not leave the ice on more than 10 to 15 minutes at a time.  It may take a few days before you will feel the effects of the injection.  What follow-up care is needed?  Your doctor may ask you to make visits to the office to check on your progress. Be sure to keep these visits. You may also need to see a physical therapist (PT). The PT will teach you exercises to help you get back your strength and motion. Ask your doctor when you can exercise.  What problems could happen?  Bleeding  Infection (rare)  Headache  Nerve injury  If you have diabetes, your blood sugar can go up after the injection. Check with your doctor if you need more treatment for this.  Last Reviewed Date

## 2023-11-30 NOTE — OP NOTE
Level performed  C7-T1    Operation  Cervical epidural steroid injection.      Surgical indications  The patient is here today to receive a cervical epidural steroid injection to assist with pain.    Operative report  The patient was taken to the procedure room, was placed in a prone positioning. The neck area was prepped and draped sterilely in the normal fashion under fluoroscopic guidance. The above-mentioned space was identified. The skin and subcutaneous tissue was anesthetized 1% lidocaine. An 18-gauge Tuohy needle was introduced using the normal saline loss-of-resistance technique. Once the loss of resistance had been achieved, the final positioning of the needle was confirmed with negative aspiration of heme and CSF. Then, the patient received 80 mg of Depo-Medrol diluted into normal saline preservative-free making a total volume of 5 mL. The patient tolerated the procedure well, was taken to the recovery room, allowed to recover for sufficient amount of time and then was discharged home in stable condition. The patient was advised to followup at the [facility] to reassess the improvement and determine the need for repeating injection on an as-needed basis.    Thank you for allowing us to participate in the care of this patient.      Donta Mckeon MD  Medical director of [facility]   10:46 AM  11/30/23

## 2023-12-07 ENCOUNTER — TELEPHONE (OUTPATIENT)
Dept: PAIN MEDICINE | Facility: CLINIC | Age: 68
End: 2023-12-07

## 2024-02-28 ENCOUNTER — OFFICE VISIT (OUTPATIENT)
Dept: ORTHOPEDIC SURGERY | Facility: CLINIC | Age: 69
End: 2024-02-28
Payer: MEDICARE

## 2024-02-28 DIAGNOSIS — M25.569 KNEE PAIN, UNSPECIFIED CHRONICITY, UNSPECIFIED LATERALITY: Primary | ICD-10-CM

## 2024-02-28 PROBLEM — M17.11 UNILATERAL PRIMARY OSTEOARTHRITIS, RIGHT KNEE: Status: ACTIVE | Noted: 2024-02-28

## 2024-02-28 PROCEDURE — 99214 OFFICE O/P EST MOD 30 MIN: CPT | Performed by: ORTHOPAEDIC SURGERY

## 2024-02-28 PROCEDURE — 1036F TOBACCO NON-USER: CPT | Performed by: ORTHOPAEDIC SURGERY

## 2024-02-28 PROCEDURE — 20610 DRAIN/INJ JOINT/BURSA W/O US: CPT | Mod: RT | Performed by: ORTHOPAEDIC SURGERY

## 2024-02-28 PROCEDURE — 99214 OFFICE O/P EST MOD 30 MIN: CPT | Mod: 57 | Performed by: ORTHOPAEDIC SURGERY

## 2024-02-28 PROCEDURE — 1125F AMNT PAIN NOTED PAIN PRSNT: CPT | Performed by: ORTHOPAEDIC SURGERY

## 2024-02-28 PROCEDURE — 1157F ADVNC CARE PLAN IN RCRD: CPT | Performed by: ORTHOPAEDIC SURGERY

## 2024-02-28 PROCEDURE — 4010F ACE/ARB THERAPY RXD/TAKEN: CPT | Performed by: ORTHOPAEDIC SURGERY

## 2024-02-28 NOTE — PROGRESS NOTES
History of Present Illness:  Patient with known osteoarthritis of the knee who presents today for repeat evaluation.  The patient notes persistent pain as well as some occasional mechanical symptoms.  The patient has tried the following modalities: Rest, ice, corticosteroid injections, bracing, NSAIDs.      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    Review of Systems:   GENERAL: Negative for malaise, significant weight loss, fever  MUSCULOSKELETAL: see HPI  NEURO:  Negative    Physical Examination:  Right Knee:  Skin healthy and intact  No gross swelling or ecchymosis  Alignment: Varus  Effusion: Trace  ROM: 5 degrees to 95 degrees  Crepitance with range of motion  No pain with internal rotation of the hip  Tenderness to palpation: over medial and lateral joint line and with patellar compression     No laxity to valgus stress  No laxity to varus stress  Negative Lachman´s test  Negative posterior drawer test  Mild pain with Joby´s test    Neurovascular exam normal distally  2+ DP pulse and good cap refill    Imaging:  Reviewed, degenerative joint disease noted medial compartment posterior bone fragments    Assessment:  Patient with known osteoarthritis of the right knee    Plan:  We reviewed an evidence-based approach to OA of the knee.  We discussed past treatments as well options for future treatment.  We discussed NSAIDS (risks and benefits), low impact activities.   The patient has failed to improve with multiple non-operative modalities.  There is increasing difficulty with activities of daily living and concern for falls.  The patient is endorsing severe pain and disability.      We had a lengthy discussion regarding total knee arthroplasty including the orthopaedic risks, including but not limited to: stiffness, infection, hematoma, early aseptic loosening, neurologic or vascular injury, clicking, difficulty kneeling and incomplete relief of pain.  We reviewed the  medical risks, including but not limited to: deep venous thrombosis, pulmonary embolism, and cardiovascular/pulmonary issues.    We discussed the anticipated longevity of the implants and potential for revision surgery.  We also discussed anticoagulation, rehabilitation goals, and the hospital course.  We discussed the likelihood of opioid analgesics and risks associated with them.    The next step is to obtain medical risk stratification and encouraged the pre-operative information seminar at the hospital.  We are happy to provide assistance and counseling if the patient has any additional concerns.     Of note, patient only has one kidney.     In a face to face encounter, I evaluated the patient and performed a physical examination, discussed pertinent diagnostic studies if indicated and discussed diagnosis and management strategies with both the patient and physician assistant / nurse practitioner.  I reviewed the PA/NP's note and agree with the documented findings and plan of care.    Discussed with the patient aspiration today based on swelling he like to pursue surgical intervention last hemoglobin A1c was reasonable.  Declined corticosteroid.    L Inj/Asp: R knee on 2/28/2024 5:10 PM  Indications: pain and joint swelling  Details: 18 G needle, superolateral approach  Aspirate: 20 mL clear and yellow  Outcome: tolerated well, no immediate complications  Procedure, treatment alternatives, risks and benefits explained, specific risks discussed. Immediately prior to procedure a time out was called to verify the correct patient, procedure, equipment, support staff and site/side marked as required. Patient was prepped and draped in the usual sterile fashion.             Steffen Frost MD

## 2024-02-28 NOTE — H&P
History Of Present Illness  Dalton Champion is a 68 y.o. male presenting with right knee pain and known degenerative changes to the knee.     Past Medical History  He has a past medical history of Abdominal cramping (04/24/2023), Knee pain (04/24/2023), and Personal history of other endocrine, nutritional and metabolic disease.    Surgical History  He has a past surgical history that includes Other surgical history (07/12/2021).     Social History  He reports that he has quit smoking. His smoking use included cigarettes. He has never used smokeless tobacco. He reports that he does not drink alcohol and does not use drugs.    Family History  Family History   Problem Relation Name Age of Onset    No Known Problems Mother      Diabetes Father      Diabetes Brother          Allergies  Iodinated contrast media and Iodine    Review of Systems   CONSTITUTIONAL: Denies weight loss, fever and chills.    - HEENT: Denies changes in vision and hearing.    - RESPIRATORY: Denies SOB and cough.    - CV: Denies palpitations and CP.    - GI: Denies abdominal pain, nausea, vomiting and diarrhea.    - : Denies dysuria and urinary frequency.    - MSK: See physical exam findings     - SKIN: Denies rash and pruritus.    - NEUROLOGICAL: Denies headache and syncope.    - PSYCHIATRIC: Denies recent changes in mood. Denies anxiety and depression.      Physical Exam  - GENERAL: Alert and oriented x 3. No acute distress. Well-nourished.    - EYES: EOMI. Anicteric.    - HENT: Moist mucous membranes. No scleral icterus. No cervical lymphadenopathy.    - LUNGS: Clear to auscultation bilaterally. No accessory muscle use.    - CARDIOVASCULAR: Regular rate and rhythm. No murmur. No JVD.    - ABDOMEN: Soft, non-tender and non-distended. No palpable masses.    - EXTREMITIES: Diffuse pain and loss of motion at the right knee     - NEUROLOGIC: No focal neurological deficits. CN II-XII grossly intact, but not individually tested.    - PSYCHIATRIC:  Cooperative. Appropriate mood and affect.      Last Recorded Vitals  There were no vitals taken for this visit.    Relevant Results      Scheduled medications    Continuous medications    PRN medications    No results found for this or any previous visit (from the past 24 hour(s)).    Assessment/Plan   There are no diagnoses linked to this encounter.    Discussed right total knee arthroplasty with the patient, he is agreeable like to proceed.    He does not have any metal allergies, denies history of DVT or PE.  Was born with 1 kidney.       I spent 10 minutes in the professional and overall care of this patient.      Chava Willams PA-C

## 2024-02-29 ENCOUNTER — APPOINTMENT (OUTPATIENT)
Dept: PRIMARY CARE | Facility: CLINIC | Age: 69
End: 2024-02-29
Payer: MEDICARE

## 2024-02-29 DIAGNOSIS — M17.11 PRIMARY OSTEOARTHRITIS OF RIGHT KNEE: Primary | ICD-10-CM

## 2024-03-06 ENCOUNTER — OFFICE VISIT (OUTPATIENT)
Dept: PRIMARY CARE | Facility: CLINIC | Age: 69
End: 2024-03-06
Payer: MEDICARE

## 2024-03-06 VITALS
HEIGHT: 68 IN | TEMPERATURE: 97.2 F | HEART RATE: 99 BPM | DIASTOLIC BLOOD PRESSURE: 85 MMHG | WEIGHT: 208 LBS | BODY MASS INDEX: 31.52 KG/M2 | SYSTOLIC BLOOD PRESSURE: 145 MMHG

## 2024-03-06 DIAGNOSIS — E66.09 CLASS 1 OBESITY DUE TO EXCESS CALORIES WITH SERIOUS COMORBIDITY AND BODY MASS INDEX (BMI) OF 31.0 TO 31.9 IN ADULT: Primary | ICD-10-CM

## 2024-03-06 DIAGNOSIS — E11.9 DIABETES MELLITUS TYPE 2, INSULIN DEPENDENT (MULTI): ICD-10-CM

## 2024-03-06 DIAGNOSIS — Z79.4 DIABETES MELLITUS TYPE 2, INSULIN DEPENDENT (MULTI): ICD-10-CM

## 2024-03-06 DIAGNOSIS — K21.9 GASTROESOPHAGEAL REFLUX DISEASE, UNSPECIFIED WHETHER ESOPHAGITIS PRESENT: ICD-10-CM

## 2024-03-06 DIAGNOSIS — M15.9 OSTEOARTHRITIS OF MULTIPLE JOINTS, UNSPECIFIED OSTEOARTHRITIS TYPE: ICD-10-CM

## 2024-03-06 DIAGNOSIS — R35.0 FREQUENT URINATION: ICD-10-CM

## 2024-03-06 DIAGNOSIS — Z79.4 LONG-TERM INSULIN USE (MULTI): ICD-10-CM

## 2024-03-06 DIAGNOSIS — E78.5 HYPERLIPIDEMIA, UNSPECIFIED HYPERLIPIDEMIA TYPE: ICD-10-CM

## 2024-03-06 PROBLEM — E66.811 CLASS 1 OBESITY DUE TO EXCESS CALORIES WITH SERIOUS COMORBIDITY AND BODY MASS INDEX (BMI) OF 31.0 TO 31.9 IN ADULT: Status: ACTIVE | Noted: 2024-03-06

## 2024-03-06 PROCEDURE — 1157F ADVNC CARE PLAN IN RCRD: CPT | Performed by: INTERNAL MEDICINE

## 2024-03-06 PROCEDURE — 4010F ACE/ARB THERAPY RXD/TAKEN: CPT | Performed by: INTERNAL MEDICINE

## 2024-03-06 PROCEDURE — 1125F AMNT PAIN NOTED PAIN PRSNT: CPT | Performed by: INTERNAL MEDICINE

## 2024-03-06 PROCEDURE — 3079F DIAST BP 80-89 MM HG: CPT | Performed by: INTERNAL MEDICINE

## 2024-03-06 PROCEDURE — 1036F TOBACCO NON-USER: CPT | Performed by: INTERNAL MEDICINE

## 2024-03-06 PROCEDURE — 1159F MED LIST DOCD IN RCRD: CPT | Performed by: INTERNAL MEDICINE

## 2024-03-06 PROCEDURE — 3077F SYST BP >= 140 MM HG: CPT | Performed by: INTERNAL MEDICINE

## 2024-03-06 PROCEDURE — 3008F BODY MASS INDEX DOCD: CPT | Performed by: INTERNAL MEDICINE

## 2024-03-06 PROCEDURE — 99214 OFFICE O/P EST MOD 30 MIN: CPT | Performed by: INTERNAL MEDICINE

## 2024-03-06 NOTE — PROGRESS NOTES
"Subjective   Patient ID: Dalton Champion is a 68 y.o. male who presents for Establish Care (Establish care. ) and Pre-op Exam (Pre op exam 4/1/224 right knee replacement [facility] .).    HPI Pt is a 68 y.o. CM with extensive PMHX who was seen and evaluated during the OV. Pt reports the hx of OA of multiple joints. Pt states that the ortho team is planning to to the RTKR on him in 3 weeks. Pt states that he is compliant with his meds and has no active issues other than frequent urination for the last couple months. During the clinical encounter pt denies fever, chills, no SOB, no chest pain/tightness, no abdominal pain, no N/V/D reported, no blood in urine/cloudy urine. Pt denies any other health concerns during this visit.  Sohx: reviewed  PMHx and Fhx: reviewed    Review of Systems  12 Systems have been reviewed as follows:   Constitutional: no fever, no chills  Eyes: no eyesight problems, no eye redness, no eye pain, no blurred vision  ENT: no ear pain or sore throat, no nasal discharge or congestion, no sinus pressure, no hearing loss  Cardiovascular: no chest pain or tightness, no SOB, the heart rate was not fast or slow  Respiratory: no cough, no dyspnea with exertion or with rest, no wheezing  Gastrointestinal: no abdominal pain, no constipation or diarrhea, no heartburn, no nausea or vomiting  Genitourinary: as mentioned at HPI  Musculoskeletal:  as mentioned at HPI  Integumentary: no skin lesions or rash  Neurological: no headaches, no dizziness or fainting, no limb weakness  Psychiatric: no mood changes, no anxiety or depression, no suicidal thoughts  Endocrine: no weight change, no temperature intolerance, no change of appetite  Hematologic/Lymphatic: no easy bruising or bleeding  Objective   /85 (BP Location: Right arm, Patient Position: Sitting)   Pulse 99   Temp 36.2 °C (97.2 °F)   Ht 1.727 m (5' 8\")   Wt 94.3 kg (208 lb)   BMI 31.63 kg/m²     Physical Exam  Constitutional: well hydrated, " well nourished, no acute distress. Vital signs reviewed  Head and face: normocephalic, atraumatic  Eyes: no erythema, edema or visible abnormalities of conjunctiva or lids. Pupils are equal, round and reactive to light. Extraocular movement normal  ENT: external ears without deformities  Neck: full ROM, no adenopathy, neck was supple  Pulmonary: normal respiratory rate and effort. Lungs are clear to auscultation, no rales,no rhonchi or wheezing  Cardiovascular: RRR, normal S1 and S2, no murmur, no gallop, posterior tib. pulse normal 2+ bilaterally, no lower extremity edema  Abdomen: soft, non tender on palpation, not distended, normal BS in all 4 quadrants  Musculoskeletal: no joint swelling, normal movements of all 4 extremities. Gait and station: normal, Digits and nails: normal without clubbing  Skin: normal color, no rash  Neurologic: Cranial nerves: CN II: visual acuity intact. Source: acuity reported by patient. Pupils reactive to light with normal accommodation. Right and left pupil normal CN III, IV and VI: the EO movements were intact. CN VIII: no hearing loss. Deep tendon reflexes: were 2+ and symmetric: R and L patella.  Sensory exam: normal light to touch  Psychiatric: Mood and affect: normal, Alert and Oriented x 3  Lymphatic: no cervical lymphadenopathy  Assessment/Plan   DM, T2 on Insulin: Pt also takes metformin 1000 mg PO BID, Jardiance 10 mg PO daily, Humalog 25/25 100U daily. Pt does not check BG level at home on the regular basis  Will check HGBa1C, CMP  Pt alos FU with the endocrinology team   HLD: on simvastatin 10 mg PO daily. Will check lipid panel  Pt takes candesartan for the renal protection, but hos BP reading was on the elevated side during the visit. Will check CBC, TSH/T4 level and U alb  Frequent urination: will check UA+UC  GERD: on cimetidine  Pt also has the upcoming BW and ECG with the ortho team. Pt will need pre surgical clearance  Plan was d/c with the pt in details, verbalized  understanding, agreed  Please follow up with PCP as planned or sooner if needed

## 2024-03-12 ENCOUNTER — PRE-ADMISSION TESTING (OUTPATIENT)
Dept: PREADMISSION TESTING | Facility: HOSPITAL | Age: 69
End: 2024-03-12
Payer: MEDICARE

## 2024-03-12 ENCOUNTER — LAB (OUTPATIENT)
Dept: LAB | Facility: LAB | Age: 69
End: 2024-03-12
Payer: MEDICARE

## 2024-03-12 VITALS
SYSTOLIC BLOOD PRESSURE: 159 MMHG | TEMPERATURE: 96.8 F | HEIGHT: 68 IN | RESPIRATION RATE: 16 BRPM | BODY MASS INDEX: 31.67 KG/M2 | DIASTOLIC BLOOD PRESSURE: 89 MMHG | WEIGHT: 209 LBS | OXYGEN SATURATION: 99 % | HEART RATE: 78 BPM

## 2024-03-12 DIAGNOSIS — R94.5 ABNORMAL RESULTS OF LIVER FUNCTION STUDIES: ICD-10-CM

## 2024-03-12 DIAGNOSIS — E78.5 HYPERLIPIDEMIA, UNSPECIFIED HYPERLIPIDEMIA TYPE: ICD-10-CM

## 2024-03-12 DIAGNOSIS — M17.11 UNILATERAL PRIMARY OSTEOARTHRITIS, RIGHT KNEE: ICD-10-CM

## 2024-03-12 DIAGNOSIS — Z01.818 PREOP TESTING: ICD-10-CM

## 2024-03-12 DIAGNOSIS — E66.09 CLASS 1 OBESITY DUE TO EXCESS CALORIES WITH SERIOUS COMORBIDITY AND BODY MASS INDEX (BMI) OF 31.0 TO 31.9 IN ADULT: ICD-10-CM

## 2024-03-12 DIAGNOSIS — Z79.4 LONG-TERM INSULIN USE (MULTI): ICD-10-CM

## 2024-03-12 DIAGNOSIS — R35.0 FREQUENT URINATION: ICD-10-CM

## 2024-03-12 DIAGNOSIS — R79.9 ABNORMAL FINDING OF BLOOD CHEMISTRY, UNSPECIFIED: ICD-10-CM

## 2024-03-12 DIAGNOSIS — Z01.818 PRE-OP TESTING: Primary | ICD-10-CM

## 2024-03-12 LAB
ALBUMIN SERPL BCP-MCNC: 4.4 G/DL (ref 3.4–5)
ALP SERPL-CCNC: 64 U/L (ref 33–136)
ALT SERPL W P-5'-P-CCNC: 20 U/L (ref 10–52)
ANION GAP SERPL CALC-SCNC: 13 MMOL/L (ref 10–20)
APPEARANCE UR: CLEAR
AST SERPL W P-5'-P-CCNC: 29 U/L (ref 9–39)
BASOPHILS # BLD AUTO: 0.04 X10*3/UL (ref 0–0.1)
BASOPHILS NFR BLD AUTO: 0.6 %
BILIRUB SERPL-MCNC: 0.5 MG/DL (ref 0–1.2)
BILIRUB UR STRIP.AUTO-MCNC: NEGATIVE MG/DL
BUN SERPL-MCNC: 26 MG/DL (ref 6–23)
CALCIUM SERPL-MCNC: 9.4 MG/DL (ref 8.6–10.3)
CHLORIDE SERPL-SCNC: 101 MMOL/L (ref 98–107)
CHOLEST SERPL-MCNC: 123 MG/DL (ref 0–199)
CHOLESTEROL/HDL RATIO: 3.2
CO2 SERPL-SCNC: 25 MMOL/L (ref 21–32)
COLOR UR: YELLOW
CREAT SERPL-MCNC: 1.59 MG/DL (ref 0.5–1.3)
CREAT UR-MCNC: 60.4 MG/DL (ref 20–370)
EGFRCR SERPLBLD CKD-EPI 2021: 46 ML/MIN/1.73M*2
EOSINOPHIL # BLD AUTO: 0.24 X10*3/UL (ref 0–0.4)
EOSINOPHIL NFR BLD AUTO: 3.6 %
ERYTHROCYTE [DISTWIDTH] IN BLOOD BY AUTOMATED COUNT: 14.3 % (ref 11.5–14.5)
EST. AVERAGE GLUCOSE BLD GHB EST-MCNC: 192 MG/DL
GLUCOSE SERPL-MCNC: 142 MG/DL (ref 74–99)
GLUCOSE UR STRIP.AUTO-MCNC: ABNORMAL MG/DL
HBA1C MFR BLD: 8.3 %
HCT VFR BLD AUTO: 47.7 % (ref 41–52)
HDLC SERPL-MCNC: 38.5 MG/DL
HGB BLD-MCNC: 15.2 G/DL (ref 13.5–17.5)
IMM GRANULOCYTES # BLD AUTO: 0.02 X10*3/UL (ref 0–0.5)
IMM GRANULOCYTES NFR BLD AUTO: 0.3 % (ref 0–0.9)
INR PPP: 1 (ref 0.9–1.1)
KETONES UR STRIP.AUTO-MCNC: NEGATIVE MG/DL
LDLC SERPL CALC-MCNC: 62 MG/DL
LEUKOCYTE ESTERASE UR QL STRIP.AUTO: NEGATIVE
LYMPHOCYTES # BLD AUTO: 1.65 X10*3/UL (ref 0.8–3)
LYMPHOCYTES NFR BLD AUTO: 24.7 %
MCH RBC QN AUTO: 29.1 PG (ref 26–34)
MCHC RBC AUTO-ENTMCNC: 31.9 G/DL (ref 32–36)
MCV RBC AUTO: 91 FL (ref 80–100)
MICROALBUMIN UR-MCNC: 13.2 MG/L
MICROALBUMIN/CREAT UR: 21.9 UG/MG CREAT
MONOCYTES # BLD AUTO: 0.75 X10*3/UL (ref 0.05–0.8)
MONOCYTES NFR BLD AUTO: 11.2 %
NEUTROPHILS # BLD AUTO: 3.97 X10*3/UL (ref 1.6–5.5)
NEUTROPHILS NFR BLD AUTO: 59.6 %
NITRITE UR QL STRIP.AUTO: NEGATIVE
NON HDL CHOLESTEROL: 85 MG/DL (ref 0–149)
NRBC BLD-RTO: 0 /100 WBCS (ref 0–0)
PH UR STRIP.AUTO: 5 [PH]
PLATELET # BLD AUTO: 276 X10*3/UL (ref 150–450)
POTASSIUM SERPL-SCNC: 4.9 MMOL/L (ref 3.5–5.3)
PROT SERPL-MCNC: 6.8 G/DL (ref 6.4–8.2)
PROT UR STRIP.AUTO-MCNC: NEGATIVE MG/DL
PROTHROMBIN TIME: 11 SECONDS (ref 9.8–12.8)
RBC # BLD AUTO: 5.22 X10*6/UL (ref 4.5–5.9)
RBC # UR STRIP.AUTO: NEGATIVE /UL
SODIUM SERPL-SCNC: 134 MMOL/L (ref 136–145)
SP GR UR STRIP.AUTO: 1.02
TRIGL SERPL-MCNC: 111 MG/DL (ref 0–149)
TSH SERPL-ACNC: 3.31 MIU/L (ref 0.44–3.98)
UROBILINOGEN UR STRIP.AUTO-MCNC: <2 MG/DL
VLDL: 22 MG/DL (ref 0–40)
WBC # BLD AUTO: 6.7 X10*3/UL (ref 4.4–11.3)

## 2024-03-12 PROCEDURE — 81003 URINALYSIS AUTO W/O SCOPE: CPT

## 2024-03-12 PROCEDURE — 87086 URINE CULTURE/COLONY COUNT: CPT

## 2024-03-12 PROCEDURE — 36415 COLL VENOUS BLD VENIPUNCTURE: CPT

## 2024-03-12 PROCEDURE — 82570 ASSAY OF URINE CREATININE: CPT

## 2024-03-12 PROCEDURE — 80061 LIPID PANEL: CPT

## 2024-03-12 PROCEDURE — 87081 CULTURE SCREEN ONLY: CPT | Mod: STJLAB | Performed by: STUDENT IN AN ORGANIZED HEALTH CARE EDUCATION/TRAINING PROGRAM

## 2024-03-12 PROCEDURE — 93005 ELECTROCARDIOGRAM TRACING: CPT

## 2024-03-12 PROCEDURE — 85025 COMPLETE CBC W/AUTO DIFF WBC: CPT

## 2024-03-12 PROCEDURE — 99204 OFFICE O/P NEW MOD 45 MIN: CPT | Performed by: NURSE PRACTITIONER

## 2024-03-12 PROCEDURE — 83036 HEMOGLOBIN GLYCOSYLATED A1C: CPT

## 2024-03-12 PROCEDURE — 80053 COMPREHEN METABOLIC PANEL: CPT

## 2024-03-12 PROCEDURE — 85610 PROTHROMBIN TIME: CPT

## 2024-03-12 PROCEDURE — 82043 UR ALBUMIN QUANTITATIVE: CPT

## 2024-03-12 PROCEDURE — 84443 ASSAY THYROID STIM HORMONE: CPT

## 2024-03-12 RX ORDER — CHLORHEXIDINE GLUCONATE ORAL RINSE 1.2 MG/ML
SOLUTION DENTAL
Qty: 473 ML | Refills: 0 | Status: SHIPPED | OUTPATIENT
Start: 2024-03-12 | End: 2024-04-02 | Stop reason: HOSPADM

## 2024-03-12 ASSESSMENT — DUKE ACTIVITY SCORE INDEX (DASI)
CAN YOU HAVE SEXUAL RELATIONS: NO
CAN YOU DO YARD WORK LIKE RAKING LEAVES, WEEDING OR PUSHING A MOWER: NO
CAN YOU PARTICIPATE IN MODERATE RECREATIONAL ACTIVITIES LIKE GOLF, BOWLING, DANCING, DOUBLES TENNIS OR THROWING A BASEBALL OR FOOTBALL: NO
TOTAL_SCORE: 18.7
CAN YOU CLIMB A FLIGHT OF STAIRS OR WALK UP A HILL: NO
DASI METS SCORE: 5
CAN YOU DO LIGHT WORK AROUND THE HOUSE LIKE DUSTING OR WASHING DISHES: YES
CAN YOU DO MODERATE WORK AROUND THE HOUSE LIKE VACUUMING, SWEEPING FLOORS OR CARRYING GROCERIES: YES
CAN YOU RUN A SHORT DISTANCE: NO
CAN YOU TAKE CARE OF YOURSELF (EAT, DRESS, BATHE, OR USE TOILET): YES
CAN YOU WALK A BLOCK OR TWO ON LEVEL GROUND: NO
CAN YOU WALK INDOORS, SUCH AS AROUND YOUR HOUSE: YES
CAN YOU DO HEAVY WORK AROUND THE HOUSE LIKE SCRUBBING FLOORS OR LIFTING AND MOVING HEAVY FURNITURE: YES
CAN YOU PARTICIPATE IN STRENOUS SPORTS LIKE SWIMMING, SINGLES TENNIS, FOOTBALL, BASKETBALL, OR SKIING: NO

## 2024-03-12 ASSESSMENT — CHADS2 SCORE
CHF: NO
HYPERTENSION: NO
PRIOR STROKE OR TIA OR THROMBOEMBOLISM: NO
AGE GREATER THAN OR EQUAL TO 75: NO
DIABETES: YES
CHADS2 SCORE: 1

## 2024-03-12 ASSESSMENT — ACTIVITIES OF DAILY LIVING (ADL): ADL_SCORE: 0

## 2024-03-12 ASSESSMENT — LIFESTYLE VARIABLES: SMOKING_STATUS: NONSMOKER

## 2024-03-12 NOTE — H&P (VIEW-ONLY)
CPM/PAT Evaluation       Name: Dalton Champion (Dalton Champion)  /Age: 1955/69 y.o.     In-Person       Chief Complaint: right knee pains    HPI    BC is a 68 yo male who has been having right knee pain for several years and now pain has worsened, causing difficulty with ADLs. Imaging completed showing right knee OA. Subsequently he is scheduled for a right TKR. Denies any recent steroid injections and skin intact on surgical limb. Otherwise denies any recent illness, fever/chills, chest pains or shortness of breath.     Past Medical History:   Diagnosis Date    Abdominal cramping 2023    Arthritis     Basal cell carcinoma (BCC)     Chronic pain disorder     Congenital absence of right kidney     GERD (gastroesophageal reflux disease)     Hyperlipidemia     Knee pain 2023    Lung nodules     Personal history of other endocrine, nutritional and metabolic disease     History of type 2 diabetes mellitus    Testicular cancer (CMS/HCC)      PCP: Dr. Robles  Cards: Dr. Fournier  PM: Dr. Mckeon    2022  IMPRESSION:  1. Coronary artery calcium score of 372.95*.  2.  A noncalcified right lung nodule measuring 4 mm, likely benign.  No further follow-up is required, however, if the patient has high  risk factors for primary lung malignancy, follow-up noncontrast CT  scan chest in 12 months may be obtained. (Charly Heardhodeirdre et al.,  Guidelines for management of incidental pulmonary nodules detected on  CT images: From the Fleischner Society 2017, Radiology. 2017 Jul;284  (1):228-243.) [email or web address]  3. Sequela of remote granulomatous infection.  4. Probable diffuse fatty infiltration of the liver incidentally  noted.    Nuclear stress test Dec 2022  IMPRESSION:  1.  Normal myocardial perfusion study without evidence of ischemia or  prior infarction.  2. The left ventricle is normal in size.  3. Normal LV wall motion with an LV EF estimated at 51-53%.    Past Surgical History:   Procedure Laterality  "Date    COLONOSCOPY      ORCHIECTOMY Right     WISDOM TOOTH EXTRACTION         Patient  has no history on file for sexual activity.    Family History   Problem Relation Name Age of Onset    No Known Problems Mother      Diabetes Father      Diabetes Brother         Allergies   Allergen Reactions    Iodinated Contrast Media Hives    Iodine Hives       Prior to Admission medications    Medication Sig Start Date End Date Taking? Authorizing Provider   amitriptyline (Elavil) 25 mg tablet Take 1 tablet (25 mg) by mouth once daily at bedtime. 7/18/23 7/17/24  Remy Pisano DO   BD Ultra-Fine Vandana Pen Needle 32 gauge x 5/32\" needle USE 2 NEEDLES DAILY 9/7/23   Remy Pisano DO   blood-glucose sensor (Dexcom G7 Sensor) device CHANGE EVERY 10 DAYS 10/19/23   Addy John MD   candesartan (Atacand) 16 mg tablet TAKE 1 TABLET DAILY 8/10/23   Remy Pisano DO   chlorhexidine (Peridex) 0.12 % solution Swish and spit 15 ml for 2 doses, 15mL the night before surgery and 15 ml morning of surgery - swish for 30 seconds -DO NOT SWALLOW, SPIT OUT 3/12/24   YURI Maradiaga-CNP   cimetidine (Tagamet) 800 mg tablet TAKE 1 TABLET AT BEDTIME 7/3/23   Farrah Gallo MD   Dexcom G4 platinum  (Dexcom G7 ) misc Use as instructed 10/19/23   Addy John MD   HumaLOG Mix 75-25 KwikPen 100 unit/mL (75-25) injection INJECT 30 UNITS IN THE MORNING AND 30 UNITS IN THE EVENING 4/24/23   Remy Pisano DO   Jardiance 10 mg TAKE 1 TABLET DAILY 10/11/23   Addy John MD   metFORMIN (Glucophage) 1,000 mg tablet TAKE 1 TABLET EVERY 12 HOURS 11/8/23   Addy John MD   pioglitazone (Actos) 15 mg tablet TAKE 1 TABLET DAILY 4/16/23   Remy Pisano DO   psyllium (Metamucil) 3.4 gram packet Take by mouth. 2/14/22   Historical Provider, MD   simvastatin (Zocor) 10 mg tablet TAKE 1 TABLET DAILY 10/3/23   Remy Pisano DO   solifenacin (VESIcare) 5 mg tablet TAKE 1 TABLET DAILY 9/25/23   Remy Pisano DO "   FreeStyle Lite Strips strip Test glucose once daily 7/29/19 3/12/24  Historical Provider, MD   lancets misc 1 daily 3/30/21 3/12/24  Historical Provider, MD ARELLANO ROS   Constitutional: Negative for fever, chills, or sweats   ENMT: Negative for nasal discharge, congestion, ear pain, mouth pain, throat pain   Respiratory: Negative for cough, wheezing, shortness of breath   Cardiac: Negative for chest pain, dyspnea on exertion, palpitations   Gastrointestinal: Negative for nausea, vomiting, diarrhea, constipation, abdominal pain  Genitourinary: Negative for dysuria, flank pain, frequency, hematuria   Musculoskeletal: Positive for decreased ROM, pain, swelling, weakness in right knee    Neurological: Negative for dizziness, confusion, headache  Psychiatric: Negative for mood changes   Skin: Negative for itching, rash, ulcer    Hematologic/Lymph: Negative for bruising, easy bleeding  Allergic/Immunologic: Negative itching, sneezing, swelling      Physical Exam  HENT:      Head: Normocephalic.      Mouth/Throat:      Mouth: Mucous membranes are moist.   Eyes:      Extraocular Movements: Extraocular movements intact.      Comments: eyeglasses   Cardiovascular:      Rate and Rhythm: Normal rate and regular rhythm.   Pulmonary:      Effort: Pulmonary effort is normal.      Breath sounds: Normal breath sounds.   Abdominal:      General: Abdomen is flat.      Palpations: Abdomen is soft.   Musculoskeletal:      Cervical back: Normal range of motion.      Right knee: Decreased range of motion.   Skin:     General: Skin is warm and dry.   Neurological:      General: No focal deficit present.      Mental Status: He is alert.   Psychiatric:         Mood and Affect: Mood normal.          PAT AIRWAY:   Airway:     Neck ROM::  Limited  normal      Anesthesia:  Patient denies any anesthesia complications.     Visit Vitals  /89   Pulse 78   Temp 36 °C (96.8 °F) (Temporal)   Resp 16       DASI Risk Score      Flowsheet Row  Most Recent Value   DASI SCORE 18.7   METS Score (Will be calculated only when all the questions are answered) 5          Caprini DVT Assessment      Flowsheet Row Most Recent Value   DVT Score 11   Current Status Elective major lower extremity arthroplasty   History Prior major surgery   Age 60-75 years   BMI 31-40 (Obesity)          Modified Frailty Index      Flowsheet Row Most Recent Value   Modified Frailty Index Calculator .0909          CHADS2 Stroke Risk  Current as of 2 minutes ago        N/A 3 - 100%: High Risk   2 - 3%: Medium Risk   0 - 2%: Low Risk     Last Change: N/A          This score determines the patient's risk of having a stroke if the patient has atrial fibrillation.        This score is not applicable to this patient. Components are not calculated.          Revised Cardiac Risk Index    No data to display       Apfel Simplified Score    No data to display       Risk Analysis Index Results This Encounter         3/12/2024  1016             SILVA Cancer History: Patient indicates history of cancer    Total Risk Analysis Index Score Without Cancer: 33    Total Risk Analysis Index Score: 45          Stop Bang Score      Flowsheet Row Most Recent Value   Do you snore loudly? 0   Do you often feel tired or fatigued after your sleep? 1   Has anyone ever observed you stop breathing in your sleep? 0   Do you have or are you being treated for high blood pressure? 0   Recent BMI (Calculated) 31.8   Is BMI greater than 35 kg/m2? 0=No   Age older than 50 years old? 1=Yes   Is your neck circumference greater than 17 inches (Male) or 16 inches (Female)? 1   Gender - Male 1=Yes   STOP-BANG Total Score 4            Assessment and Plan:     68 yo male scheduled for right total knee replacement on 4/1/2024 with Dr. Frost.  Blood work and MRSA ordered-oral chlorhexidine prescribed.  EKG shows normal sinus rhythm with ventricular rate 72 bpm. He is instructed to notify surgeon office if any new skin changes occur to  surgical limb.Otherwise no further orders indicated.     Anesthesia: Congenital absence of right kidney     See risk scores as previously documented.

## 2024-03-12 NOTE — CPM/PAT H&P
CPM/PAT Evaluation       Name: Dalton Champion (Dalton Champion)  /Age: 1955/69 y.o.     In-Person       Chief Complaint: right knee pains    HPI    BC is a 70 yo male who has been having right knee pain for several years and now pain has worsened, causing difficulty with ADLs. Imaging completed showing right knee OA. Subsequently he is scheduled for a right TKR. Denies any recent steroid injections and skin intact on surgical limb. Otherwise denies any recent illness, fever/chills, chest pains or shortness of breath.     Past Medical History:   Diagnosis Date    Abdominal cramping 2023    Arthritis     Basal cell carcinoma (BCC)     Chronic pain disorder     Congenital absence of right kidney     GERD (gastroesophageal reflux disease)     Hyperlipidemia     Knee pain 2023    Lung nodules     Personal history of other endocrine, nutritional and metabolic disease     History of type 2 diabetes mellitus    Testicular cancer (CMS/HCC)      PCP: Dr. Robles  Cards: Dr. Fournier  PM: Dr. Mckeon    2022  IMPRESSION:  1. Coronary artery calcium score of 372.95*.  2.  A noncalcified right lung nodule measuring 4 mm, likely benign.  No further follow-up is required, however, if the patient has high  risk factors for primary lung malignancy, follow-up noncontrast CT  scan chest in 12 months may be obtained. (Charly Heradhodeirdre et al.,  Guidelines for management of incidental pulmonary nodules detected on  CT images: From the Fleischner Society 2017, Radiology. 2017 Jul;284  (1):228-243.) [email or web address]  3. Sequela of remote granulomatous infection.  4. Probable diffuse fatty infiltration of the liver incidentally  noted.    Nuclear stress test Dec 2022  IMPRESSION:  1.  Normal myocardial perfusion study without evidence of ischemia or  prior infarction.  2. The left ventricle is normal in size.  3. Normal LV wall motion with an LV EF estimated at 51-53%.    Past Surgical History:   Procedure Laterality  "Date    COLONOSCOPY      ORCHIECTOMY Right     WISDOM TOOTH EXTRACTION         Patient  has no history on file for sexual activity.    Family History   Problem Relation Name Age of Onset    No Known Problems Mother      Diabetes Father      Diabetes Brother         Allergies   Allergen Reactions    Iodinated Contrast Media Hives    Iodine Hives       Prior to Admission medications    Medication Sig Start Date End Date Taking? Authorizing Provider   amitriptyline (Elavil) 25 mg tablet Take 1 tablet (25 mg) by mouth once daily at bedtime. 7/18/23 7/17/24  Remy Pisano DO   BD Ultra-Fine Vandana Pen Needle 32 gauge x 5/32\" needle USE 2 NEEDLES DAILY 9/7/23   Remy Pisano DO   blood-glucose sensor (Dexcom G7 Sensor) device CHANGE EVERY 10 DAYS 10/19/23   Addy John MD   candesartan (Atacand) 16 mg tablet TAKE 1 TABLET DAILY 8/10/23   Remy Pisano DO   chlorhexidine (Peridex) 0.12 % solution Swish and spit 15 ml for 2 doses, 15mL the night before surgery and 15 ml morning of surgery - swish for 30 seconds -DO NOT SWALLOW, SPIT OUT 3/12/24   YURI Maradiaga-CNP   cimetidine (Tagamet) 800 mg tablet TAKE 1 TABLET AT BEDTIME 7/3/23   Farrah Gallo MD   Dexcom G4 platinum  (Dexcom G7 ) misc Use as instructed 10/19/23   Addy John MD   HumaLOG Mix 75-25 KwikPen 100 unit/mL (75-25) injection INJECT 30 UNITS IN THE MORNING AND 30 UNITS IN THE EVENING 4/24/23   Remy Pisano DO   Jardiance 10 mg TAKE 1 TABLET DAILY 10/11/23   Addy John MD   metFORMIN (Glucophage) 1,000 mg tablet TAKE 1 TABLET EVERY 12 HOURS 11/8/23   Addy John MD   pioglitazone (Actos) 15 mg tablet TAKE 1 TABLET DAILY 4/16/23   Remy Pisano DO   psyllium (Metamucil) 3.4 gram packet Take by mouth. 2/14/22   Historical Provider, MD   simvastatin (Zocor) 10 mg tablet TAKE 1 TABLET DAILY 10/3/23   Remy Pisano DO   solifenacin (VESIcare) 5 mg tablet TAKE 1 TABLET DAILY 9/25/23   Remy Pisano DO "   FreeStyle Lite Strips strip Test glucose once daily 7/29/19 3/12/24  Historical Provider, MD   lancets misc 1 daily 3/30/21 3/12/24  Historical Provider, MD ARELLANO ROS   Constitutional: Negative for fever, chills, or sweats   ENMT: Negative for nasal discharge, congestion, ear pain, mouth pain, throat pain   Respiratory: Negative for cough, wheezing, shortness of breath   Cardiac: Negative for chest pain, dyspnea on exertion, palpitations   Gastrointestinal: Negative for nausea, vomiting, diarrhea, constipation, abdominal pain  Genitourinary: Negative for dysuria, flank pain, frequency, hematuria   Musculoskeletal: Positive for decreased ROM, pain, swelling, weakness in right knee    Neurological: Negative for dizziness, confusion, headache  Psychiatric: Negative for mood changes   Skin: Negative for itching, rash, ulcer    Hematologic/Lymph: Negative for bruising, easy bleeding  Allergic/Immunologic: Negative itching, sneezing, swelling      Physical Exam  HENT:      Head: Normocephalic.      Mouth/Throat:      Mouth: Mucous membranes are moist.   Eyes:      Extraocular Movements: Extraocular movements intact.      Comments: eyeglasses   Cardiovascular:      Rate and Rhythm: Normal rate and regular rhythm.   Pulmonary:      Effort: Pulmonary effort is normal.      Breath sounds: Normal breath sounds.   Abdominal:      General: Abdomen is flat.      Palpations: Abdomen is soft.   Musculoskeletal:      Cervical back: Normal range of motion.      Right knee: Decreased range of motion.   Skin:     General: Skin is warm and dry.   Neurological:      General: No focal deficit present.      Mental Status: He is alert.   Psychiatric:         Mood and Affect: Mood normal.          PAT AIRWAY:   Airway:     Neck ROM::  Limited  normal      Anesthesia:  Patient denies any anesthesia complications.     Visit Vitals  /89   Pulse 78   Temp 36 °C (96.8 °F) (Temporal)   Resp 16       DASI Risk Score      Flowsheet Row  Most Recent Value   DASI SCORE 18.7   METS Score (Will be calculated only when all the questions are answered) 5          Caprini DVT Assessment      Flowsheet Row Most Recent Value   DVT Score 11   Current Status Elective major lower extremity arthroplasty   History Prior major surgery   Age 60-75 years   BMI 31-40 (Obesity)          Modified Frailty Index      Flowsheet Row Most Recent Value   Modified Frailty Index Calculator .0909          CHADS2 Stroke Risk  Current as of 2 minutes ago        N/A 3 - 100%: High Risk   2 - 3%: Medium Risk   0 - 2%: Low Risk     Last Change: N/A          This score determines the patient's risk of having a stroke if the patient has atrial fibrillation.        This score is not applicable to this patient. Components are not calculated.          Revised Cardiac Risk Index    No data to display       Apfel Simplified Score    No data to display       Risk Analysis Index Results This Encounter         3/12/2024  1016             SILVA Cancer History: Patient indicates history of cancer    Total Risk Analysis Index Score Without Cancer: 33    Total Risk Analysis Index Score: 45          Stop Bang Score      Flowsheet Row Most Recent Value   Do you snore loudly? 0   Do you often feel tired or fatigued after your sleep? 1   Has anyone ever observed you stop breathing in your sleep? 0   Do you have or are you being treated for high blood pressure? 0   Recent BMI (Calculated) 31.8   Is BMI greater than 35 kg/m2? 0=No   Age older than 50 years old? 1=Yes   Is your neck circumference greater than 17 inches (Male) or 16 inches (Female)? 1   Gender - Male 1=Yes   STOP-BANG Total Score 4            Assessment and Plan:     68 yo male scheduled for right total knee replacement on 4/1/2024 with Dr. Frost.  Blood work and MRSA ordered-oral chlorhexidine prescribed.  EKG shows normal sinus rhythm with ventricular rate 72 bpm. He is instructed to notify surgeon office if any new skin changes occur to  surgical limb.Otherwise no further orders indicated.     Anesthesia: Congenital absence of right kidney     See risk scores as previously documented.

## 2024-03-12 NOTE — PREPROCEDURE INSTRUCTIONS
"   Medication List            Accurate as of March 12, 2024 10:40 AM. Always use your most recent med list.                amitriptyline 25 mg tablet  Commonly known as: Elavil  Take 1 tablet (25 mg) by mouth once daily at bedtime.  Medication Adjustments for Surgery: Take morning of surgery with sip of water, no other fluids     BD Ultra-Fine Vandana Pen Needle 32 gauge x 5/32\" needle  Generic drug: pen needle, diabetic  USE 2 NEEDLES DAILY     candesartan 16 mg tablet  Commonly known as: Atacand  TAKE 1 TABLET DAILY  Medication Adjustments for Surgery: Other (Comment)  Notes to patient: HOLD any evening dose the night before the day of surgery  HOLD the day of surgery     chlorhexidine 0.12 % solution  Commonly known as: Peridex  Swish and spit 15 ml for 2 doses, 15mL the night before surgery and 15 ml morning of surgery - swish for 30 seconds -DO NOT SWALLOW, SPIT OUT  Medication Adjustments for Surgery: Other (Comment)  Notes to patient: As indicated     cimetidine 800 mg tablet  Commonly known as: Tagamet  TAKE 1 TABLET AT BEDTIME  Medication Adjustments for Surgery: Take morning of surgery with sip of water, no other fluids     Dexcom G7  misc  Generic drug: blood-glucose meter,continuous  Use as instructed     Dexcom G7 Sensor device  Generic drug: blood-glucose sensor  CHANGE EVERY 10 DAYS     HumaLOG Mix 75-25 KwikPen 100 unit/mL (75-25) injection  Generic drug: insulin lispro protamin-lispro  INJECT 30 UNITS IN THE MORNING AND 30 UNITS IN THE EVENING  Medication Adjustments for Surgery: Continue until night before surgery  Notes to patient: HOLD THE DAY OF SURGERY  TAKE HALF THE DOSE THE NIGHT BEFORE SURGERY      Jardiance 10 mg  Generic drug: empagliflozin  TAKE 1 TABLET DAILY  Medication Adjustments for Surgery: Stop 3 days before surgery     metFORMIN 1,000 mg tablet  Commonly known as: Glucophage  TAKE 1 TABLET EVERY 12 HOURS  Medication Adjustments for Surgery: Other (Comment)  Notes to patient: " HOLD any evening dose the night before the day of surgery  HOLD the day of surgery     pioglitazone 15 mg tablet  Commonly known as: Actos  TAKE 1 TABLET DAILY  Medication Adjustments for Surgery: Continue until night before surgery  Notes to patient: Take with meal the night before surgery  DO NOT TAKE THE MORNING OF SURGERY     psyllium 3.4 gram packet  Commonly known as: Metamucil  Medication Adjustments for Surgery: Continue until night before surgery     simvastatin 10 mg tablet  Commonly known as: Zocor  TAKE 1 TABLET DAILY  Medication Adjustments for Surgery: Other (Comment)  Notes to patient: May take the morning of surgery if this medication is prescribed to take in the mornings     solifenacin 5 mg tablet  Commonly known as: VESIcare  TAKE 1 TABLET DAILY  Medication Adjustments for Surgery: Continue until night before surgery                  PRE-OPERATIVE INSTRUCTIONS    You will receive notification one business day prior to your procedure to confirm your arrival time. It is important that you answer your phone and/or check your messages during this time. If you do not hear from the surgery center by 5 pm. the day before your procedure, please call [phone].     Please enter the building through the Outpatient entrance and take the elevator off the lobby to the 2nd floor then check in at the Outpatient Surgery desk on the 2nd floor.    INSTRUCTIONS:  Talk to your surgeon for instructions if you should stop your aspirin, blood thinner, or diabetes medicines.  DO NOT take any multivitamins or over the counter supplements for 7-10 days before surgery.  If not being admitted, you must have an adult immediately available to drive you home after surgery. We also highly recommend you have someone stay with you for the entire day and night of your surgery.  For children having surgery, a parent or legal guardian must accompany them to the surgery center. If this is not possible, please call [phone] to  make additional arrangements.  For adults who are unable to consent or make medical decisions for themselves, a legal guardian or Power of  must accompany them to the surgery center. If this is not possible, please call [phone] to make additional arrangements.  Wear comfortable, loose fitting clothing.  All jewelry and piercings must be removed. If you are unable to remove an item or have a dermal piercing, please be sure to tell the nurse when you arrive for surgery.  Nail polish and make-up must be removed.  Avoid smoking or consuming alcohol for 24 hours before surgery.  To help prevent infection, please take a shower/bath and wash your hair the night before and/or morning of surgery (or follow other specific bathing instructions provided).    Preoperative Fasting Guidelines    Why must I stop eating and drinking near surgery time?  With sedation, food or liquid in your stomach can enter your lungs causing serious complications  Increases nausea and vomiting    When do I need to stop eating and drinking before my surgery?  Do not eat any solid food after midnight the night before your surgery/procedure.  You may have up to TEN ounces of clear liquid until TWO hours before your instructed arrival time to the hospital.  This includes water, black tea/coffee, (no milk or cream) apple juice, and electrolyte drinks (Gatorade).   You may chew gum until TWO hours before your surgery/procedure    If you have any questions or concerns, please call Pre-Admission Testing at [phone].

## 2024-03-13 ENCOUNTER — APPOINTMENT (OUTPATIENT)
Dept: PRIMARY CARE | Facility: CLINIC | Age: 69
End: 2024-03-13
Payer: MEDICARE

## 2024-03-13 ENCOUNTER — TELEPHONE (OUTPATIENT)
Dept: PRIMARY CARE | Facility: CLINIC | Age: 69
End: 2024-03-13

## 2024-03-13 ENCOUNTER — TELEPHONE (OUTPATIENT)
Dept: ORTHOPEDIC SURGERY | Facility: CLINIC | Age: 69
End: 2024-03-13

## 2024-03-13 DIAGNOSIS — K21.9 GASTROESOPHAGEAL REFLUX DISEASE, UNSPECIFIED WHETHER ESOPHAGITIS PRESENT: ICD-10-CM

## 2024-03-13 DIAGNOSIS — E11.9 TYPE 2 DIABETES MELLITUS WITHOUT COMPLICATION, WITHOUT LONG-TERM CURRENT USE OF INSULIN (MULTI): ICD-10-CM

## 2024-03-13 DIAGNOSIS — G47.00 INSOMNIA, UNSPECIFIED TYPE: ICD-10-CM

## 2024-03-13 LAB — BACTERIA UR CULT: NO GROWTH

## 2024-03-13 NOTE — TELEPHONE ENCOUNTER
----- Message from Emily Olvera MD sent at 3/13/2024 11:42 AM EDT -----  Please inform the pt that the recent bw showed poorly controlled poorly controlled diabetes. The kidney function is getting worse. Please schedule the fu visit within the next week.

## 2024-03-13 NOTE — TELEPHONE ENCOUNTER
03/13/24  Spoke w/ pt re wheeled walker needed for post-op use following upcoming sx.  He does need one and was scheduled for fitting on 03/14/24 in [facility].

## 2024-03-14 ENCOUNTER — OFFICE VISIT (OUTPATIENT)
Dept: PRIMARY CARE | Facility: CLINIC | Age: 69
End: 2024-03-14
Payer: MEDICARE

## 2024-03-14 ENCOUNTER — APPOINTMENT (OUTPATIENT)
Dept: ORTHOPEDIC SURGERY | Facility: CLINIC | Age: 69
End: 2024-03-14
Payer: MEDICARE

## 2024-03-14 VITALS
WEIGHT: 209 LBS | HEIGHT: 68 IN | SYSTOLIC BLOOD PRESSURE: 135 MMHG | DIASTOLIC BLOOD PRESSURE: 80 MMHG | HEART RATE: 78 BPM | BODY MASS INDEX: 31.67 KG/M2 | TEMPERATURE: 97 F

## 2024-03-14 DIAGNOSIS — Z71.2 ENCOUNTER TO DISCUSS TEST RESULTS: Primary | ICD-10-CM

## 2024-03-14 DIAGNOSIS — E78.5 HYPERLIPIDEMIA, UNSPECIFIED HYPERLIPIDEMIA TYPE: ICD-10-CM

## 2024-03-14 DIAGNOSIS — Z79.4 DIABETES MELLITUS TYPE 2, INSULIN DEPENDENT (MULTI): ICD-10-CM

## 2024-03-14 DIAGNOSIS — Z01.818 PRE-OPERATIVE EXAM: ICD-10-CM

## 2024-03-14 DIAGNOSIS — E11.9 TYPE 2 DIABETES MELLITUS WITHOUT COMPLICATION, WITH LONG-TERM CURRENT USE OF INSULIN (MULTI): ICD-10-CM

## 2024-03-14 DIAGNOSIS — Z79.899 MEDICATION DOSE INCREASED: ICD-10-CM

## 2024-03-14 DIAGNOSIS — E11.69 TYPE 2 DIABETES MELLITUS WITH OTHER SPECIFIED COMPLICATION, WITH LONG-TERM CURRENT USE OF INSULIN (MULTI): ICD-10-CM

## 2024-03-14 DIAGNOSIS — Z79.4 TYPE 2 DIABETES MELLITUS WITH OTHER SPECIFIED COMPLICATION, WITH LONG-TERM CURRENT USE OF INSULIN (MULTI): ICD-10-CM

## 2024-03-14 DIAGNOSIS — N18.30 STAGE 3 CHRONIC KIDNEY DISEASE, UNSPECIFIED WHETHER STAGE 3A OR 3B CKD (MULTI): ICD-10-CM

## 2024-03-14 DIAGNOSIS — Z71.3 DIETARY COUNSELING AND SURVEILLANCE: ICD-10-CM

## 2024-03-14 DIAGNOSIS — E11.9 DIABETES MELLITUS TYPE 2, INSULIN DEPENDENT (MULTI): ICD-10-CM

## 2024-03-14 DIAGNOSIS — Z79.4 TYPE 2 DIABETES MELLITUS WITHOUT COMPLICATION, WITH LONG-TERM CURRENT USE OF INSULIN (MULTI): ICD-10-CM

## 2024-03-14 LAB — STAPHYLOCOCCUS SPEC CULT: NORMAL

## 2024-03-14 PROCEDURE — 3079F DIAST BP 80-89 MM HG: CPT | Performed by: INTERNAL MEDICINE

## 2024-03-14 PROCEDURE — 1160F RVW MEDS BY RX/DR IN RCRD: CPT | Performed by: INTERNAL MEDICINE

## 2024-03-14 PROCEDURE — 1036F TOBACCO NON-USER: CPT | Performed by: INTERNAL MEDICINE

## 2024-03-14 PROCEDURE — 3075F SYST BP GE 130 - 139MM HG: CPT | Performed by: INTERNAL MEDICINE

## 2024-03-14 PROCEDURE — 3008F BODY MASS INDEX DOCD: CPT | Performed by: INTERNAL MEDICINE

## 2024-03-14 PROCEDURE — 3061F NEG MICROALBUMINURIA REV: CPT | Performed by: INTERNAL MEDICINE

## 2024-03-14 PROCEDURE — 1157F ADVNC CARE PLAN IN RCRD: CPT | Performed by: INTERNAL MEDICINE

## 2024-03-14 PROCEDURE — 3048F LDL-C <100 MG/DL: CPT | Performed by: INTERNAL MEDICINE

## 2024-03-14 PROCEDURE — 3052F HG A1C>EQUAL 8.0%<EQUAL 9.0%: CPT | Performed by: INTERNAL MEDICINE

## 2024-03-14 PROCEDURE — 1159F MED LIST DOCD IN RCRD: CPT | Performed by: INTERNAL MEDICINE

## 2024-03-14 PROCEDURE — 4010F ACE/ARB THERAPY RXD/TAKEN: CPT | Performed by: INTERNAL MEDICINE

## 2024-03-14 PROCEDURE — 99215 OFFICE O/P EST HI 40 MIN: CPT | Performed by: INTERNAL MEDICINE

## 2024-03-14 RX ORDER — PIOGLITAZONEHYDROCHLORIDE 30 MG/1
30 TABLET ORAL DAILY
Qty: 90 TABLET | Refills: 0 | Status: SHIPPED | OUTPATIENT
Start: 2024-03-14 | End: 2024-06-10

## 2024-03-14 NOTE — PROGRESS NOTES
Subjective   Patient ID: Dalton Champion is a 69 y.o. male who presents for Pre-op Exam (Pre op for right knee surgery 4/1/24 [facility] . ).    HPI   Pt is a pleasant 69 y,o CM with extensive PMHX who was seen and evaluated during the OV. Pt recently had a BW done and was very frustrated with the HGBa1C level. Pt states that he was very complient with the his medication. Pt states that he would like to proceed with the ortho surgery since the chronic knees issue preclude him form the regular physical activity, hiking and regular exercise. During the clinical encounter pt denies fever, chills, no SOB, no chest pain/tightness, no abdominal pain, no N/V/D reported, no change of urination reported. Pt denies any other health concerns during this visit. Pt also had the surgery under general anesthesia years ago and had no issue with adverse reaction to anesthesia or bleeding.   Sohx: non smoker  PMHx and Fhx: reviewed    Review of Systems  12 Systems have been reviewed as follows:   Constitutional: no fever, no chills  Eyes: no eyesight problems, no eye redness, no eye pain, no blurred vision  ENT: no ear pain or sore throat, no nasal discharge or congestion, no sinus pressure, no hearing loss  Cardiovascular: no chest pain or tightness, no SOB, the heart rate was not fast or slow  Respiratory: no cough, no dyspnea with exertion or with rest, no wheezing  Gastrointestinal: no abdominal pain, no constipation or diarrhea, no heartburn, no nausea or vomiting  Genitourinary: no urine frequency, no dysuria, no urinary urgency, no urinary incontinence or retention  Musculoskeletal: no back pain, no muscle weakness, but reports chronic b/l knee pain  Integumentary: no skin lesions or rash  Neurological: no headaches, no dizziness or fainting, no limb weakness  Psychiatric: no mood changes, no anxiety or depression, no suicidal thoughts  Endocrine: no weight change, no temperature intolerance, no change of  "appetite  Hematologic/Lymphatic: no easy bruising or bleeding  Objective   /84 (BP Location: Right arm, Patient Position: Sitting)   Pulse 78   Temp 36.1 °C (97 °F)   Ht 1.727 m (5' 8\")   Wt 94.8 kg (209 lb)   BMI 31.78 kg/m²     Physical Exam  Constitutional: well hydrated, well nourished, no acute distress. Vital signs reviewed  Head and face: normocephalic, atraumatic  Eyes: no erythema, edema or visible abnormalities of conjunctiva or lids. Pupils are equal, round and reactive to light. Extraocular movement normal  ENT: external ears without deformities  Neck: full ROM, no adenopathy  Pulmonary: normal respiratory rate and effort. Lungs are clear to auscultation, no rales,no rhonchi or wheezing  Cardiovascular: RRR, normal S1 and S2, no murmur, no gallop, posterior tib. pulse normal 2+ bilaterally, no lower extremity edema  Abdomen: soft, non tender on palpation, not distended, normal BS in all 4 quadrants  Musculoskeletal: no joint swelling, normal movements of all 4 extremities. Gait and station: normal, Digits and nails: normal without clubbing  Skin: normal color, no rash  Neurologic: Cranial nerves: CN II: visual acuity intact. Source: acuity reported by patient. Pupils reactive to light with normal accommodation. Right and left pupil normal CN III, IV and VI: the EO movements were intact. CN VIII: no hearing loss. Deep tendon reflexes: were 2+ and symmetric: R and L patella.  Sensory exam: normal light to touch  Psychiatric: Mood and affect: normal, Alert and Oriented x 3  Lymphatic: no cervical lymphadenopathy    Assessment/Plan   Encounter to discuss the recent BW results:  I shared and discuss with the pt the recent BW results in details, Pt verbalized understanding  HLD: well controlled on simvastatin 10 mg PO daily  HTN: on candesartan 16 mg PO daily. Pt was advised to check BP on the regular basis ayt home and contact to PCP if the BP readings will be persistent;y elevated  DM t2, on " insulin; Based on the last HGBA1C level the diabetes controlled suboptimally. Pt reports the BG spikes during the morning time up to 160 fasting. Will increase the dose on pioglitazone up to 30 mg PO daily. Pt also was extensively counseled about low carb healthy diet. I asked the pt to see me before the surgery to discuss the BG level on the higher dose of Actos.  The recent CMP results were consistent with CKD stage III; The referral for the nephrology team evaluation was provided  Pre op clearance. The last test results were reviewed. EKG was done and showed NSR, no ST changes  Current MET of the pt at least 4  Park Perioperative Risk for Myocardial Infarction or Cardiac Arrest; 1.3 %  Risk of myocardial infarction or cardiac arrest, intraoperatively or up to 30 days post-op  Revised Cardiac Risk Index for Pre-Operative Risk: 1 points - Class II Risk 6.0 % 30-day risk of death, MI, or cardiac arrest  Pt verbalized understanding of the possible health risks during and after the surgery and would like to proceed.  Pt could be classify as a moderate risk pt for the surgery. CKD and DM issue can make the surgery more risky  I discussed every sxs with the pt in detail. Pt verbalized understanding of the health  condition, possible health risks, adverse events or reactions and even death. Pt agreed with the treatment plan.   Please follow up with PCP as planned or sooner if needed

## 2024-03-18 ENCOUNTER — TELEPHONE (OUTPATIENT)
Dept: ORTHOPEDIC SURGERY | Facility: CLINIC | Age: 69
End: 2024-03-18
Payer: MEDICARE

## 2024-03-19 ENCOUNTER — PATIENT MESSAGE (OUTPATIENT)
Dept: PRIMARY CARE | Facility: CLINIC | Age: 69
End: 2024-03-19
Payer: MEDICARE

## 2024-03-19 ENCOUNTER — E-VISIT (OUTPATIENT)
Dept: ENDOCRINOLOGY | Facility: CLINIC | Age: 69
End: 2024-03-19
Payer: MEDICARE

## 2024-03-19 DIAGNOSIS — G47.00 INSOMNIA, UNSPECIFIED TYPE: ICD-10-CM

## 2024-03-19 DIAGNOSIS — E11.69 TYPE 2 DIABETES MELLITUS WITH OTHER SPECIFIED COMPLICATION, WITH LONG-TERM CURRENT USE OF INSULIN (MULTI): Primary | ICD-10-CM

## 2024-03-19 DIAGNOSIS — E11.9 TYPE 2 DIABETES MELLITUS WITHOUT COMPLICATION, WITHOUT LONG-TERM CURRENT USE OF INSULIN (MULTI): ICD-10-CM

## 2024-03-19 DIAGNOSIS — K21.9 GASTROESOPHAGEAL REFLUX DISEASE, UNSPECIFIED WHETHER ESOPHAGITIS PRESENT: ICD-10-CM

## 2024-03-19 DIAGNOSIS — Z79.4 TYPE 2 DIABETES MELLITUS WITH OTHER SPECIFIED COMPLICATION, WITH LONG-TERM CURRENT USE OF INSULIN (MULTI): Primary | ICD-10-CM

## 2024-03-19 RX ORDER — AMITRIPTYLINE HYDROCHLORIDE 25 MG/1
25 TABLET, FILM COATED ORAL NIGHTLY
Qty: 90 TABLET | Refills: 1 | OUTPATIENT
Start: 2024-03-19 | End: 2024-09-15

## 2024-03-19 RX ORDER — AMITRIPTYLINE HYDROCHLORIDE 25 MG/1
25 TABLET, FILM COATED ORAL NIGHTLY
Qty: 90 TABLET | Refills: 1 | Status: CANCELLED | OUTPATIENT
Start: 2024-03-19 | End: 2025-03-19

## 2024-03-19 RX ORDER — SOLIFENACIN SUCCINATE 5 MG/1
5 TABLET, FILM COATED ORAL DAILY
Qty: 90 TABLET | Refills: 0 | Status: SHIPPED | OUTPATIENT
Start: 2024-03-19 | End: 2024-06-17

## 2024-03-19 RX ORDER — SOLIFENACIN SUCCINATE 5 MG/1
5 TABLET, FILM COATED ORAL DAILY
Qty: 90 TABLET | Refills: 1 | OUTPATIENT
Start: 2024-03-19 | End: 2024-09-15

## 2024-03-19 RX ORDER — AMITRIPTYLINE HYDROCHLORIDE 25 MG/1
25 TABLET, FILM COATED ORAL NIGHTLY
Qty: 90 TABLET | Refills: 0 | Status: SHIPPED | OUTPATIENT
Start: 2024-03-19 | End: 2024-06-17

## 2024-03-19 RX ORDER — CIMETIDINE 800 MG/1
800 TABLET, FILM COATED ORAL NIGHTLY
Qty: 90 TABLET | Refills: 1 | Status: CANCELLED | OUTPATIENT
Start: 2024-03-19

## 2024-03-19 RX ORDER — SOLIFENACIN SUCCINATE 5 MG/1
5 TABLET, FILM COATED ORAL DAILY
Qty: 90 TABLET | Refills: 1 | Status: CANCELLED | OUTPATIENT
Start: 2024-03-19

## 2024-03-19 RX ORDER — CIMETIDINE 800 MG/1
800 TABLET, FILM COATED ORAL NIGHTLY
Qty: 90 TABLET | Refills: 0 | Status: SHIPPED | OUTPATIENT
Start: 2024-03-19 | End: 2024-06-17

## 2024-03-19 RX ORDER — CIMETIDINE 800 MG/1
800 TABLET, FILM COATED ORAL NIGHTLY
Qty: 90 TABLET | Refills: 1 | OUTPATIENT
Start: 2024-03-19 | End: 2024-09-15

## 2024-03-19 NOTE — TELEPHONE ENCOUNTER
Hello   I have three prescriptions that are going to need refilling soon from [facility] they are:     Solifenacin Succirate Tabs 5mg   Amitriphyline Tabs 25mg   Cimetidine Tabs 800mg     Thank you for your time   Dalton RUSSELL 3/28/2024

## 2024-03-20 ENCOUNTER — OFFICE VISIT (OUTPATIENT)
Dept: DERMATOLOGY | Facility: CLINIC | Age: 69
End: 2024-03-20
Payer: MEDICARE

## 2024-03-20 DIAGNOSIS — D22.9 MULTIPLE BENIGN NEVI: ICD-10-CM

## 2024-03-20 DIAGNOSIS — L57.0 ACTINIC KERATOSIS: ICD-10-CM

## 2024-03-20 DIAGNOSIS — L82.1 SEBORRHEIC KERATOSIS: ICD-10-CM

## 2024-03-20 DIAGNOSIS — L81.4 LENTIGO: ICD-10-CM

## 2024-03-20 DIAGNOSIS — L73.8 SEBACEOUS HYPERPLASIA OF FACE: ICD-10-CM

## 2024-03-20 DIAGNOSIS — D18.01 HEMANGIOMA OF SKIN: Primary | ICD-10-CM

## 2024-03-20 DIAGNOSIS — Z85.828 PERSONAL HISTORY OF SKIN CANCER: ICD-10-CM

## 2024-03-20 PROCEDURE — 1157F ADVNC CARE PLAN IN RCRD: CPT | Performed by: STUDENT IN AN ORGANIZED HEALTH CARE EDUCATION/TRAINING PROGRAM

## 2024-03-20 PROCEDURE — 99213 OFFICE O/P EST LOW 20 MIN: CPT | Performed by: STUDENT IN AN ORGANIZED HEALTH CARE EDUCATION/TRAINING PROGRAM

## 2024-03-20 PROCEDURE — 3008F BODY MASS INDEX DOCD: CPT | Performed by: STUDENT IN AN ORGANIZED HEALTH CARE EDUCATION/TRAINING PROGRAM

## 2024-03-20 PROCEDURE — 4010F ACE/ARB THERAPY RXD/TAKEN: CPT | Performed by: STUDENT IN AN ORGANIZED HEALTH CARE EDUCATION/TRAINING PROGRAM

## 2024-03-20 PROCEDURE — 1036F TOBACCO NON-USER: CPT | Performed by: STUDENT IN AN ORGANIZED HEALTH CARE EDUCATION/TRAINING PROGRAM

## 2024-03-20 PROCEDURE — 17003 DESTRUCT PREMALG LES 2-14: CPT | Performed by: STUDENT IN AN ORGANIZED HEALTH CARE EDUCATION/TRAINING PROGRAM

## 2024-03-20 PROCEDURE — 1160F RVW MEDS BY RX/DR IN RCRD: CPT | Performed by: STUDENT IN AN ORGANIZED HEALTH CARE EDUCATION/TRAINING PROGRAM

## 2024-03-20 PROCEDURE — 3048F LDL-C <100 MG/DL: CPT | Performed by: STUDENT IN AN ORGANIZED HEALTH CARE EDUCATION/TRAINING PROGRAM

## 2024-03-20 PROCEDURE — 17000 DESTRUCT PREMALG LESION: CPT | Performed by: STUDENT IN AN ORGANIZED HEALTH CARE EDUCATION/TRAINING PROGRAM

## 2024-03-20 PROCEDURE — 1159F MED LIST DOCD IN RCRD: CPT | Performed by: STUDENT IN AN ORGANIZED HEALTH CARE EDUCATION/TRAINING PROGRAM

## 2024-03-20 PROCEDURE — 3052F HG A1C>EQUAL 8.0%<EQUAL 9.0%: CPT | Performed by: STUDENT IN AN ORGANIZED HEALTH CARE EDUCATION/TRAINING PROGRAM

## 2024-03-20 PROCEDURE — 3061F NEG MICROALBUMINURIA REV: CPT | Performed by: STUDENT IN AN ORGANIZED HEALTH CARE EDUCATION/TRAINING PROGRAM

## 2024-03-20 NOTE — PATIENT INSTRUCTIONS
WHAT TO LOOK FOR: ABCDES OF MELANOMA:    A is for Asymmetry  One half of the spot is unlike the other half.    B is for Border  The spot has an irregular, scalloped, or poorly defined border.    C is for Color  The spot has varying colors from one area to the next, such as shades of tan, brown or black, or areas of white, red, or blue.    D is for Diameter  While melanomas are usually greater than 6 millimeters, or about the size of a pencil eraser, when diagnosed, they can be smaller.    E is for Evolving  The spot looks different from the rest or is changing in size, shape, or color.      Protecting Your Skin from the Sun     The sun’s rays contain ultraviolet (UV) radiation that can damage our skin. There is no “safe” ultraviolet ray. Even on cloudy days, UV radiation reaches the earth and can cause skin damage. Ultraviolet A (UVA) is primarily responsible for premature aging, wrinkles, and tanning, while ultraviolet B (UVB) causes sunburns. Both types can severely damage the skin and cause skin cancer.    Sun exposure is the most preventable risk factor for all skin cancers, including melanoma. You can still have fun in the sun and decrease your risk for skin cancer.     Apply water-resistant sunscreen generously with a Sun Protection Factor (SPF) of at least 30 that provides broad-spectrum protection from both ultraviolet A (UVA) and ultraviolet B (UVB) rays to all exposed skin. Re-apply every two hours, even on cloudy days, and after sweating or swimming. Sunscreens are not perfect because some ultraviolet light may still get through sunscreens, so they should not be used as a way of prolonging sun exposure.  Seek shade when appropriate, remembering that the sun’s rays are the strongest between 10 AM and 4 PM.  Wear sun protective clothing such as a long-sleeved shirt, pants, a wide-brimmed hat, and sunglasses, when possible.  Some sunlight will get through your clothing.  You can wear sunscreen underneath, or  you can buy clothing that has been treated to give additional sun protection.  Such clothing will have a UPF rating on the label.  (e.g., www.coolCallystrorGamar, www.Attendify)  Protect children from sun exposure by having them play in the shade, dressing them in protective clothing, and applying sunscreen.  Use extra precaution when near water, snow, and sand.  These surfaces reflect the damaging rays of the sun and can increase your chance of sunburn.  Get vitamin D safely through a healthy diet that may include vitamin supplements.  Don’t seek the sun for vitamin D.  Avoid tanning beds. Ultraviolet light from the sun and tanning beds can cause wrinkling and skin cancer. If you want to look like you’ve been in the sun, consider using a sunless self-tanning product, but continue to use sunscreen with it.  Perform a regular self skin exam.  If you notice anything changing, growing, or bleeding on your skin, see a dermatologist.  Skin cancer is very treatable when caught early.    Examples of good broad-spectrum sunscreens:  Blue Lizard  Coppertone Spectra 3  [place] Block  Neutrogena Ultra Sheer Dry Touch  Vanicream  Solbar  Total Block/Cotz  Elta MD    What the active ingredients do:  UVB blockers:  padimate O homosalate, octyl methoxycinnamate, benzophenone octyl salicylate, phenylbenzimadazole sulfonic acid, octocrylene  UVA blockers:  oxybenzone, avobenzone (Parsol 1789)  Physical blockers:  titanium dioxide, zinc oxide (These are chemical-free sunscreens that reflect both UVA and UVB. These may be safest if you are allergic to some sunscreens. These may also be better for sensitive skin.)

## 2024-03-20 NOTE — PROGRESS NOTES
Subjective   Dalton Champion is a 69 y.o. male who presents for the following: Skin Check (Last FBSE 3/15/23. No lesions of concern today.)    Skin Cancer History  BCC - mid lower back s/p ED&C 5/24/23  AK's    Family History of Skin Cancer  NMSC - Father    The following portions of the chart were reviewed this encounter and updated as appropriate:         Review of Systems: No other skin or systemic complaints.    Objective   Well appearing patient in no apparent distress; mood and affect are within normal limits.    A full examination was performed including scalp, head, eyes, ears, nose, lips, neck, chest, axillae, abdomen, back, buttocks, bilateral upper extremities, bilateral lower extremities, hands, feet, fingers, toes, fingernails, and toenails. All findings within normal limits unless otherwise noted below.    Scattered cherry-red papule(s).    Well healed scar(s) at site(s) of prior treatment    Scattered tan macules in sun-exposed areas.    Stuck on verrucous, tan-brown papules and plaques.      Scattered, uniform and benign-appearing, regular brown melanocytic papules and macules.    Small yellow, lobulated papules with a central dell.    Scalp (8)  Erythematous macules with gritty scale.      Assessment/Plan   Hemangioma of skin    Reassured of benign nature of lesions    Personal history of skin cancer    No evidence of recurrence at site(s) of prior treatment  Continue photoprotection with sun-protective clothing and sunscreen SPF 30+ daily  Continue routine self-skin examination  Continue to follow up every 6 months for routine FBSE or earlier prn for new/changing/concerning lesions       Related Procedures  Follow Up In Dermatology - Established Patient    Lentigo    Reassured of benign nature of lesions    Seborrheic keratosis    Reassured of benign nature of lesions    Multiple benign nevi    Reassured of benign nature of lesions    Sebaceous hyperplasia of face    Reassured of benign nature of  lesions    Actinic keratosis (8)  Scalp    Discussed precancerous nature of condition and relationship with sun-exposure.   Recommended treatment today with LN2. Discussed r/b of procedure including risk of pain, temporary redness, and dyspigmentation. Patient verbalized understanding and agreement with plan for treatment today.    Destr of lesion - Scalp  Complexity: simple    Destruction method: cryotherapy    Informed consent: discussed and consent obtained    Lesion destroyed using liquid nitrogen: Yes    Cryotherapy cycles:  1  Outcome: patient tolerated procedure well with no complications    Post-procedure details: wound care instructions given          Scribe Attestation  By signing my name below, I, Hetal Killian LPN , Scribe   attest that this documentation has been prepared under the direction and in the presence of Ulises Sams MD.

## 2024-03-25 ENCOUNTER — PATIENT MESSAGE (OUTPATIENT)
Dept: PRIMARY CARE | Facility: CLINIC | Age: 69
End: 2024-03-25
Payer: MEDICARE

## 2024-03-25 DIAGNOSIS — E78.5 HYPERLIPIDEMIA, UNSPECIFIED HYPERLIPIDEMIA TYPE: ICD-10-CM

## 2024-03-25 RX ORDER — SIMVASTATIN 10 MG/1
10 TABLET, FILM COATED ORAL DAILY
Qty: 90 TABLET | Refills: 0 | Status: SHIPPED | OUTPATIENT
Start: 2024-03-25 | End: 2024-06-23

## 2024-03-25 RX ORDER — BLOOD-GLUCOSE METER
KIT MISCELLANEOUS
Qty: 100 EACH | Refills: 3 | Status: SHIPPED | OUTPATIENT
Start: 2024-03-25

## 2024-03-27 ENCOUNTER — TELEPHONE (OUTPATIENT)
Dept: INPATIENT UNIT | Facility: HOSPITAL | Age: 69
End: 2024-03-27
Payer: MEDICARE

## 2024-03-27 ASSESSMENT — KOOS JR
STANDING UPRIGHT: MODERATE
TWISING OR PIVOTING ON KNEE: MODERATE
GOING UP OR DOWN STAIRS: SEVERE
KOOS JR SCORING: 54.84
HOW SEVERE IS YOUR KNEE STIFFNESS AFTER FIRST WAKING IN MORNING: MODERATE
BENDING TO THE FLOOR TO PICK UP OBJECT: MILD
RISING FROM SITTING: MODERATE
STRAIGHTENING KNEE FULLY: MILD

## 2024-03-27 NOTE — TELEPHONE ENCOUNTER
Dalton attended pre-op [facility] class on 3/27/2024 accompanied by his wife. All questions answered and advised to contact me or surgeons office if needed. They were given a folder with information sheets on pre-op exercises, CHG wash, fall prevention, hourly rounding, and accessing/ paying bills on [employer] This also included the TJR Guide.    PRO's filled out in class, entered into chart.    Blossom Arceo RN  Orthopedic   [phone]

## 2024-03-28 ENCOUNTER — TELEMEDICINE (OUTPATIENT)
Dept: PRIMARY CARE | Facility: CLINIC | Age: 69
End: 2024-03-28
Payer: MEDICARE

## 2024-03-28 DIAGNOSIS — Z71.89 CARE PLAN DISCUSSED WITH PATIENT: Primary | ICD-10-CM

## 2024-03-28 DIAGNOSIS — E11.9 TYPE 2 DIABETES MELLITUS WITHOUT COMPLICATION, WITH LONG-TERM CURRENT USE OF INSULIN (MULTI): ICD-10-CM

## 2024-03-28 DIAGNOSIS — Z79.4 TYPE 2 DIABETES MELLITUS WITHOUT COMPLICATION, WITH LONG-TERM CURRENT USE OF INSULIN (MULTI): ICD-10-CM

## 2024-03-28 PROCEDURE — 4010F ACE/ARB THERAPY RXD/TAKEN: CPT | Performed by: INTERNAL MEDICINE

## 2024-03-28 PROCEDURE — 99442 PR PHYS/QHP TELEPHONE EVALUATION 11-20 MIN: CPT | Performed by: INTERNAL MEDICINE

## 2024-03-28 PROCEDURE — 1160F RVW MEDS BY RX/DR IN RCRD: CPT | Performed by: INTERNAL MEDICINE

## 2024-03-28 PROCEDURE — 3052F HG A1C>EQUAL 8.0%<EQUAL 9.0%: CPT | Performed by: INTERNAL MEDICINE

## 2024-03-28 PROCEDURE — 3048F LDL-C <100 MG/DL: CPT | Performed by: INTERNAL MEDICINE

## 2024-03-28 PROCEDURE — 3061F NEG MICROALBUMINURIA REV: CPT | Performed by: INTERNAL MEDICINE

## 2024-03-28 PROCEDURE — 1159F MED LIST DOCD IN RCRD: CPT | Performed by: INTERNAL MEDICINE

## 2024-03-28 PROCEDURE — 3008F BODY MASS INDEX DOCD: CPT | Performed by: INTERNAL MEDICINE

## 2024-03-28 PROCEDURE — 1036F TOBACCO NON-USER: CPT | Performed by: INTERNAL MEDICINE

## 2024-03-28 PROCEDURE — 1157F ADVNC CARE PLAN IN RCRD: CPT | Performed by: INTERNAL MEDICINE

## 2024-03-28 NOTE — PROGRESS NOTES
Subjective   Patient ID: Dalton Champion is a 69 y.o. male who presents for Follow-up (Follow up, pt is unsure why he has an appt today states it was scheduled at his last OV. ).    HPI Pt is a pleasant 69 y.o. M who was evaluated during the phone call visit. Pt has the issue with Dmt2. During the last OV the dose of Pioglitazone was increased up to 30 mg po daily. Pt states that he has no SE on this dose and tolerated mediation well. Pt states that his fasting BG level is around 116 persistently. During the clinical encounter pt denies fever, chills, no SOB, no chest pain/tightness, no abdominal pain, no N/V/D reported, no change of urination reported. Pt denies any other health concerns during this visit.  Sohx: reviewed  PMHx and Fhx: reviewed    Review of Systems  12 Systems have been reviewed as follows:   Constitutional: no fever, no chills  Eyes: no eyesight problems, no eye redness, no eye pain, no blurred vision  ENT: no ear pain or sore throat, no nasal discharge or congestion, no sinus pressure, no hearing loss  Cardiovascular: no chest pain or tightness, no SOB, the heart rate was not fast or slow  Respiratory: no cough, no dyspnea with exertion or with rest, no wheezing  Gastrointestinal: no abdominal pain, no constipation or diarrhea, no heartburn, no nausea or vomiting  Genitourinary: no urine frequency, no dysuria, no urinary urgency, no urinary incontinence or retention  Musculoskeletal: no back pain, no arthralgia, no joint swelling or stiffness, no muscle weakness  Integumentary: no skin lesions or rash  Neurological: no headaches, no dizziness or fainting, no limb weakness  Psychiatric: no mood changes, no anxiety or depression, no SI/HI  Endocrine: no weight change, no temperature intolerance, no change of appetite  Hematologic/Lymphatic: no easy bruising or bleeding  Objective   There were no vitals taken for this visit.    Physical Exam  PE was not performed due to the phone setting of this  visit, but pt was able to speak in full sentences, alert, oriented, not confused.  Vitals: were not provided    Assessment/Plan   DM type 2, on insulin. Encounter to discuss the treatment:  Hx as mentioned  Since the pt was able to tolerate Pioglitazone 30 mg PO daily with no SE and the BG level is under better control this dose of Pioglitazone will be continued. Also. Continue on current dose of Humalog 75/25, [place] 10 mg po daily, Metformin 1000 mg BID.  Pt was instructed to contact PCP if pt will have no improvement of the condition/worsening of the sxs/new sxs on the current treatment  Plan was d/c with the pt in details, verbalized understanding, agreed  Please follow up with PCP as planned or sooner if needed    A telephone visit (audio only) between the patient (at the originating site) and the provider (at the distant site) was utilized to provide this telehealth service.  Verbal consent was requested and obtained from the patient during a telehealth visit.

## 2024-03-29 RX ORDER — SODIUM CHLORIDE, SODIUM LACTATE, POTASSIUM CHLORIDE, CALCIUM CHLORIDE 600; 310; 30; 20 MG/100ML; MG/100ML; MG/100ML; MG/100ML
100 INJECTION, SOLUTION INTRAVENOUS CONTINUOUS
Status: CANCELLED | OUTPATIENT
Start: 2024-03-29

## 2024-03-29 RX ORDER — ACETAMINOPHEN 325 MG/1
650 TABLET ORAL ONCE
Status: CANCELLED | OUTPATIENT
Start: 2024-03-29 | End: 2024-03-29

## 2024-03-29 RX ORDER — TRANEXAMIC ACID 650 MG/1
1950 TABLET ORAL ONCE
Status: CANCELLED | OUTPATIENT
Start: 2024-03-29 | End: 2024-03-29

## 2024-03-29 RX ORDER — CEFAZOLIN SODIUM 2 G/100ML
2 INJECTION, SOLUTION INTRAVENOUS ONCE
Status: CANCELLED | OUTPATIENT
Start: 2024-03-29 | End: 2024-03-29

## 2024-03-29 RX ORDER — CELECOXIB 200 MG/1
200 CAPSULE ORAL ONCE
Status: CANCELLED | OUTPATIENT
Start: 2024-03-29 | End: 2024-03-29

## 2024-04-01 ENCOUNTER — ANESTHESIA (OUTPATIENT)
Dept: OPERATING ROOM | Facility: HOSPITAL | Age: 69
End: 2024-04-01
Payer: MEDICARE

## 2024-04-01 ENCOUNTER — ANESTHESIA EVENT (OUTPATIENT)
Dept: OPERATING ROOM | Facility: HOSPITAL | Age: 69
End: 2024-04-01
Payer: MEDICARE

## 2024-04-01 ENCOUNTER — HOSPITAL ENCOUNTER (OUTPATIENT)
Facility: HOSPITAL | Age: 69
Discharge: HOME | End: 2024-04-02
Attending: ORTHOPAEDIC SURGERY | Admitting: ORTHOPAEDIC SURGERY
Payer: MEDICARE

## 2024-04-01 DIAGNOSIS — M17.11 UNILATERAL PRIMARY OSTEOARTHRITIS, RIGHT KNEE: Primary | ICD-10-CM

## 2024-04-01 DIAGNOSIS — Z96.651 S/P TOTAL KNEE ARTHROPLASTY, RIGHT: ICD-10-CM

## 2024-04-01 PROBLEM — Z96.652 TOTAL KNEE REPLACEMENT STATUS, LEFT: Status: ACTIVE | Noted: 2024-04-01

## 2024-04-01 LAB
GLUCOSE BLD MANUAL STRIP-MCNC: 100 MG/DL (ref 74–99)
GLUCOSE BLD MANUAL STRIP-MCNC: 198 MG/DL (ref 74–99)
GLUCOSE BLD MANUAL STRIP-MCNC: 89 MG/DL (ref 74–99)

## 2024-04-01 PROCEDURE — 7100000001 HC RECOVERY ROOM TIME - INITIAL BASE CHARGE: Performed by: ORTHOPAEDIC SURGERY

## 2024-04-01 PROCEDURE — A27447 PR TOTAL KNEE ARTHROPLASTY: Performed by: ANESTHESIOLOGIST ASSISTANT

## 2024-04-01 PROCEDURE — A6213 FOAM DRG >16<=48 SQ IN W/BDR: HCPCS | Performed by: ORTHOPAEDIC SURGERY

## 2024-04-01 PROCEDURE — 2500000004 HC RX 250 GENERAL PHARMACY W/ HCPCS (ALT 636 FOR OP/ED): Performed by: ANESTHESIOLOGY

## 2024-04-01 PROCEDURE — 3700000002 HC GENERAL ANESTHESIA TIME - EACH INCREMENTAL 1 MINUTE: Performed by: ORTHOPAEDIC SURGERY

## 2024-04-01 PROCEDURE — 2500000002 HC RX 250 W HCPCS SELF ADMINISTERED DRUGS (ALT 637 FOR MEDICARE OP, ALT 636 FOR OP/ED): Performed by: STUDENT IN AN ORGANIZED HEALTH CARE EDUCATION/TRAINING PROGRAM

## 2024-04-01 PROCEDURE — 2500000004 HC RX 250 GENERAL PHARMACY W/ HCPCS (ALT 636 FOR OP/ED): Performed by: STUDENT IN AN ORGANIZED HEALTH CARE EDUCATION/TRAINING PROGRAM

## 2024-04-01 PROCEDURE — C1776 JOINT DEVICE (IMPLANTABLE): HCPCS | Performed by: ORTHOPAEDIC SURGERY

## 2024-04-01 PROCEDURE — C1713 ANCHOR/SCREW BN/BN,TIS/BN: HCPCS | Performed by: ORTHOPAEDIC SURGERY

## 2024-04-01 PROCEDURE — 2500000005 HC RX 250 GENERAL PHARMACY W/O HCPCS: Performed by: ORTHOPAEDIC SURGERY

## 2024-04-01 PROCEDURE — 2500000001 HC RX 250 WO HCPCS SELF ADMINISTERED DRUGS (ALT 637 FOR MEDICARE OP): Performed by: ORTHOPAEDIC SURGERY

## 2024-04-01 PROCEDURE — 27447 TOTAL KNEE ARTHROPLASTY: CPT | Performed by: ORTHOPAEDIC SURGERY

## 2024-04-01 PROCEDURE — 2720000007 HC OR 272 NO HCPCS: Performed by: ORTHOPAEDIC SURGERY

## 2024-04-01 PROCEDURE — G0378 HOSPITAL OBSERVATION PER HR: HCPCS

## 2024-04-01 PROCEDURE — A27447 PR TOTAL KNEE ARTHROPLASTY: Performed by: ANESTHESIOLOGY

## 2024-04-01 PROCEDURE — 2500000004 HC RX 250 GENERAL PHARMACY W/ HCPCS (ALT 636 FOR OP/ED): Performed by: ANESTHESIOLOGIST ASSISTANT

## 2024-04-01 PROCEDURE — 2500000004 HC RX 250 GENERAL PHARMACY W/ HCPCS (ALT 636 FOR OP/ED): Mod: JZ | Performed by: ORTHOPAEDIC SURGERY

## 2024-04-01 PROCEDURE — 99100 ANES PT EXTEME AGE<1 YR&>70: CPT | Performed by: ANESTHESIOLOGY

## 2024-04-01 PROCEDURE — 2500000001 HC RX 250 WO HCPCS SELF ADMINISTERED DRUGS (ALT 637 FOR MEDICARE OP): Performed by: STUDENT IN AN ORGANIZED HEALTH CARE EDUCATION/TRAINING PROGRAM

## 2024-04-01 PROCEDURE — 2500000002 HC RX 250 W HCPCS SELF ADMINISTERED DRUGS (ALT 637 FOR MEDICARE OP, ALT 636 FOR OP/ED): Performed by: PHYSICIAN ASSISTANT

## 2024-04-01 PROCEDURE — 3600000017 HC OR TIME - EACH INCREMENTAL 1 MINUTE - PROCEDURE LEVEL SIX: Performed by: ORTHOPAEDIC SURGERY

## 2024-04-01 PROCEDURE — 82947 ASSAY GLUCOSE BLOOD QUANT: CPT | Mod: 91

## 2024-04-01 PROCEDURE — 7100000002 HC RECOVERY ROOM TIME - EACH INCREMENTAL 1 MINUTE: Performed by: ORTHOPAEDIC SURGERY

## 2024-04-01 PROCEDURE — 7100000011 HC EXTENDED STAY RECOVERY HOURLY - NURSING UNIT

## 2024-04-01 PROCEDURE — 3700000001 HC GENERAL ANESTHESIA TIME - INITIAL BASE CHARGE: Performed by: ORTHOPAEDIC SURGERY

## 2024-04-01 PROCEDURE — 2780000003 HC OR 278 NO HCPCS: Performed by: ORTHOPAEDIC SURGERY

## 2024-04-01 PROCEDURE — 27447 TOTAL KNEE ARTHROPLASTY: CPT | Performed by: STUDENT IN AN ORGANIZED HEALTH CARE EDUCATION/TRAINING PROGRAM

## 2024-04-01 PROCEDURE — 64447 NJX AA&/STRD FEMORAL NRV IMG: CPT | Performed by: ANESTHESIOLOGY

## 2024-04-01 PROCEDURE — 97116 GAIT TRAINING THERAPY: CPT | Mod: GP

## 2024-04-01 PROCEDURE — 3600000018 HC OR TIME - INITIAL BASE CHARGE - PROCEDURE LEVEL SIX: Performed by: ORTHOPAEDIC SURGERY

## 2024-04-01 PROCEDURE — 97161 PT EVAL LOW COMPLEX 20 MIN: CPT | Mod: GP

## 2024-04-01 PROCEDURE — 2500000002 HC RX 250 W HCPCS SELF ADMINISTERED DRUGS (ALT 637 FOR MEDICARE OP, ALT 636 FOR OP/ED): Performed by: ORTHOPAEDIC SURGERY

## 2024-04-01 DEVICE — TIBIAL BEARING INSERT - CS
Type: IMPLANTABLE DEVICE | Site: KNEE | Status: FUNCTIONAL
Brand: TRIATHLON

## 2024-04-01 DEVICE — PRIMARY TIBIAL BASEPLATE
Type: IMPLANTABLE DEVICE | Site: KNEE | Status: FUNCTIONAL
Brand: TRIATHLON

## 2024-04-01 DEVICE — CRUCIATE RETAINING FEMORAL
Type: IMPLANTABLE DEVICE | Site: KNEE | Status: FUNCTIONAL
Brand: TRIATHLON

## 2024-04-01 DEVICE — CEMENT, BONE, BIOMET R, 1X40: Type: IMPLANTABLE DEVICE | Site: KNEE | Status: FUNCTIONAL

## 2024-04-01 DEVICE — ASYMMETRIC PATELLA
Type: IMPLANTABLE DEVICE | Site: KNEE | Status: FUNCTIONAL
Brand: TRIATHLON

## 2024-04-01 RX ORDER — DOCUSATE SODIUM 100 MG/1
100 CAPSULE, LIQUID FILLED ORAL 2 TIMES DAILY
Status: DISCONTINUED | OUTPATIENT
Start: 2024-04-01 | End: 2024-04-02 | Stop reason: HOSPADM

## 2024-04-01 RX ORDER — SIMVASTATIN 10 MG/1
10 TABLET, FILM COATED ORAL DAILY
Status: DISCONTINUED | OUTPATIENT
Start: 2024-04-01 | End: 2024-04-02 | Stop reason: HOSPADM

## 2024-04-01 RX ORDER — LIDOCAINE HYDROCHLORIDE 10 MG/ML
0.1 INJECTION, SOLUTION EPIDURAL; INFILTRATION; INTRACAUDAL; PERINEURAL ONCE
Status: CANCELLED | OUTPATIENT
Start: 2024-04-01 | End: 2024-04-01

## 2024-04-01 RX ORDER — DEXTROSE 50 % IN WATER (D50W) INTRAVENOUS SYRINGE
12.5
Status: DISCONTINUED | OUTPATIENT
Start: 2024-04-01 | End: 2024-04-02 | Stop reason: HOSPADM

## 2024-04-01 RX ORDER — MEPERIDINE HYDROCHLORIDE 50 MG/ML
12.5 INJECTION INTRAMUSCULAR; INTRAVENOUS; SUBCUTANEOUS EVERY 10 MIN PRN
Status: DISCONTINUED | OUTPATIENT
Start: 2024-04-01 | End: 2024-04-01 | Stop reason: HOSPADM

## 2024-04-01 RX ORDER — OXYCODONE HYDROCHLORIDE 10 MG/1
10 TABLET ORAL EVERY 4 HOURS PRN
Status: DISCONTINUED | OUTPATIENT
Start: 2024-04-01 | End: 2024-04-02 | Stop reason: HOSPADM

## 2024-04-01 RX ORDER — ROPIVACAINE/EPI/CLONIDINE/KET 2.46-0.005
SYRINGE (ML) INJECTION AS NEEDED
Status: DISCONTINUED | OUTPATIENT
Start: 2024-04-01 | End: 2024-04-01 | Stop reason: HOSPADM

## 2024-04-01 RX ORDER — TRANEXAMIC ACID 650 MG/1
1950 TABLET ORAL ONCE
Status: COMPLETED | OUTPATIENT
Start: 2024-04-02 | End: 2024-04-02

## 2024-04-01 RX ORDER — AMITRIPTYLINE HYDROCHLORIDE 25 MG/1
25 TABLET, FILM COATED ORAL NIGHTLY
Status: DISCONTINUED | OUTPATIENT
Start: 2024-04-01 | End: 2024-04-02 | Stop reason: HOSPADM

## 2024-04-01 RX ORDER — ONDANSETRON 4 MG/1
4 TABLET, ORALLY DISINTEGRATING ORAL EVERY 8 HOURS PRN
Status: DISCONTINUED | OUTPATIENT
Start: 2024-04-01 | End: 2024-04-02 | Stop reason: HOSPADM

## 2024-04-01 RX ORDER — METFORMIN HYDROCHLORIDE 1000 MG/1
1000 TABLET ORAL EVERY 12 HOURS
Status: DISCONTINUED | OUTPATIENT
Start: 2024-04-01 | End: 2024-04-02 | Stop reason: HOSPADM

## 2024-04-01 RX ORDER — MIDAZOLAM HYDROCHLORIDE 1 MG/ML
INJECTION, SOLUTION INTRAMUSCULAR; INTRAVENOUS AS NEEDED
Status: DISCONTINUED | OUTPATIENT
Start: 2024-04-01 | End: 2024-04-01

## 2024-04-01 RX ORDER — HYDROMORPHONE HYDROCHLORIDE 1 MG/ML
0.5 INJECTION, SOLUTION INTRAMUSCULAR; INTRAVENOUS; SUBCUTANEOUS EVERY 4 HOURS PRN
Status: DISCONTINUED | OUTPATIENT
Start: 2024-04-01 | End: 2024-04-02 | Stop reason: HOSPADM

## 2024-04-01 RX ORDER — METOCLOPRAMIDE HYDROCHLORIDE 5 MG/ML
10 INJECTION INTRAMUSCULAR; INTRAVENOUS ONCE AS NEEDED
Status: DISCONTINUED | OUTPATIENT
Start: 2024-04-01 | End: 2024-04-01 | Stop reason: HOSPADM

## 2024-04-01 RX ORDER — PIOGLITAZONEHYDROCHLORIDE 15 MG/1
30 TABLET ORAL DAILY
Status: DISCONTINUED | OUTPATIENT
Start: 2024-04-02 | End: 2024-04-02 | Stop reason: HOSPADM

## 2024-04-01 RX ORDER — LABETALOL HYDROCHLORIDE 5 MG/ML
5 INJECTION, SOLUTION INTRAVENOUS ONCE AS NEEDED
Status: DISCONTINUED | OUTPATIENT
Start: 2024-04-01 | End: 2024-04-01 | Stop reason: HOSPADM

## 2024-04-01 RX ORDER — ASPIRIN 81 MG/1
81 TABLET ORAL 2 TIMES DAILY
Status: DISCONTINUED | OUTPATIENT
Start: 2024-04-01 | End: 2024-04-02 | Stop reason: HOSPADM

## 2024-04-01 RX ORDER — PIOGLITAZONEHYDROCHLORIDE 15 MG/1
30 TABLET ORAL DAILY
Status: DISCONTINUED | OUTPATIENT
Start: 2024-04-01 | End: 2024-04-01

## 2024-04-01 RX ORDER — HYDRALAZINE HYDROCHLORIDE 20 MG/ML
5 INJECTION INTRAMUSCULAR; INTRAVENOUS EVERY 30 MIN PRN
Status: DISCONTINUED | OUTPATIENT
Start: 2024-04-01 | End: 2024-04-01 | Stop reason: HOSPADM

## 2024-04-01 RX ORDER — SODIUM CHLORIDE, SODIUM LACTATE, POTASSIUM CHLORIDE, CALCIUM CHLORIDE 600; 310; 30; 20 MG/100ML; MG/100ML; MG/100ML; MG/100ML
100 INJECTION, SOLUTION INTRAVENOUS CONTINUOUS
Status: DISCONTINUED | OUTPATIENT
Start: 2024-04-01 | End: 2024-04-01

## 2024-04-01 RX ORDER — SODIUM CHLORIDE, SODIUM LACTATE, POTASSIUM CHLORIDE, CALCIUM CHLORIDE 600; 310; 30; 20 MG/100ML; MG/100ML; MG/100ML; MG/100ML
50 INJECTION, SOLUTION INTRAVENOUS CONTINUOUS
Status: DISCONTINUED | OUTPATIENT
Start: 2024-04-01 | End: 2024-04-02 | Stop reason: HOSPADM

## 2024-04-01 RX ORDER — VALSARTAN 160 MG/1
160 TABLET ORAL DAILY
Status: DISCONTINUED | OUTPATIENT
Start: 2024-04-01 | End: 2024-04-02 | Stop reason: HOSPADM

## 2024-04-01 RX ORDER — FAMOTIDINE 20 MG/1
40 TABLET, FILM COATED ORAL NIGHTLY
Status: DISCONTINUED | OUTPATIENT
Start: 2024-04-01 | End: 2024-04-02 | Stop reason: HOSPADM

## 2024-04-01 RX ORDER — TRANEXAMIC ACID 650 MG/1
1950 TABLET ORAL ONCE
Status: COMPLETED | OUTPATIENT
Start: 2024-04-01 | End: 2024-04-01

## 2024-04-01 RX ORDER — SOLIFENACIN SUCCINATE 5 MG/1
5 TABLET, FILM COATED ORAL DAILY
Status: DISCONTINUED | OUTPATIENT
Start: 2024-04-01 | End: 2024-04-01

## 2024-04-01 RX ORDER — BISACODYL 5 MG
10 TABLET, DELAYED RELEASE (ENTERIC COATED) ORAL DAILY PRN
Status: DISCONTINUED | OUTPATIENT
Start: 2024-04-01 | End: 2024-04-02 | Stop reason: HOSPADM

## 2024-04-01 RX ORDER — DIPHENHYDRAMINE HCL 12.5MG/5ML
12.5 LIQUID (ML) ORAL EVERY 6 HOURS PRN
Status: DISCONTINUED | OUTPATIENT
Start: 2024-04-01 | End: 2024-04-02 | Stop reason: HOSPADM

## 2024-04-01 RX ORDER — FAMOTIDINE 10 MG/ML
20 INJECTION INTRAVENOUS ONCE
Status: CANCELLED | OUTPATIENT
Start: 2024-04-01 | End: 2024-04-01

## 2024-04-01 RX ORDER — ACETAMINOPHEN 325 MG/1
650 TABLET ORAL ONCE
Status: COMPLETED | OUTPATIENT
Start: 2024-04-01 | End: 2024-04-01

## 2024-04-01 RX ORDER — CYCLOBENZAPRINE HCL 10 MG
10 TABLET ORAL 3 TIMES DAILY PRN
Status: DISCONTINUED | OUTPATIENT
Start: 2024-04-01 | End: 2024-04-02 | Stop reason: HOSPADM

## 2024-04-01 RX ORDER — NALOXONE HYDROCHLORIDE 0.4 MG/ML
0.2 INJECTION, SOLUTION INTRAMUSCULAR; INTRAVENOUS; SUBCUTANEOUS EVERY 5 MIN PRN
Status: DISCONTINUED | OUTPATIENT
Start: 2024-04-01 | End: 2024-04-02 | Stop reason: HOSPADM

## 2024-04-01 RX ORDER — MORPHINE SULFATE 2 MG/ML
2 INJECTION, SOLUTION INTRAMUSCULAR; INTRAVENOUS EVERY 2 HOUR PRN
Status: DISCONTINUED | OUTPATIENT
Start: 2024-04-01 | End: 2024-04-02 | Stop reason: HOSPADM

## 2024-04-01 RX ORDER — OXYBUTYNIN CHLORIDE 5 MG/1
5 TABLET ORAL 2 TIMES DAILY
Status: DISCONTINUED | OUTPATIENT
Start: 2024-04-01 | End: 2024-04-02 | Stop reason: HOSPADM

## 2024-04-01 RX ORDER — ACETAMINOPHEN 325 MG/1
975 TABLET ORAL ONCE
Status: CANCELLED | OUTPATIENT
Start: 2024-04-01 | End: 2024-04-01

## 2024-04-01 RX ORDER — OXYCODONE HYDROCHLORIDE 10 MG/1
10 TABLET ORAL EVERY 4 HOURS PRN
Status: DISCONTINUED | OUTPATIENT
Start: 2024-04-01 | End: 2024-04-01 | Stop reason: HOSPADM

## 2024-04-01 RX ORDER — OXYCODONE HYDROCHLORIDE 5 MG/1
5 TABLET ORAL EVERY 4 HOURS PRN
Status: DISCONTINUED | OUTPATIENT
Start: 2024-04-01 | End: 2024-04-02 | Stop reason: HOSPADM

## 2024-04-01 RX ORDER — DIPHENHYDRAMINE HYDROCHLORIDE 50 MG/ML
12.5 INJECTION INTRAMUSCULAR; INTRAVENOUS ONCE AS NEEDED
Status: DISCONTINUED | OUTPATIENT
Start: 2024-04-01 | End: 2024-04-01 | Stop reason: HOSPADM

## 2024-04-01 RX ORDER — ACETAMINOPHEN 325 MG/1
650 TABLET ORAL EVERY 6 HOURS SCHEDULED
Status: DISCONTINUED | OUTPATIENT
Start: 2024-04-01 | End: 2024-04-02 | Stop reason: HOSPADM

## 2024-04-01 RX ORDER — OXYCODONE HYDROCHLORIDE 5 MG/1
5 TABLET ORAL EVERY 4 HOURS PRN
Status: DISCONTINUED | OUTPATIENT
Start: 2024-04-01 | End: 2024-04-01 | Stop reason: HOSPADM

## 2024-04-01 RX ORDER — PHENYLEPHRINE HCL IN 0.9% NACL 1 MG/10 ML
SYRINGE (ML) INTRAVENOUS AS NEEDED
Status: DISCONTINUED | OUTPATIENT
Start: 2024-04-01 | End: 2024-04-01

## 2024-04-01 RX ORDER — CEFAZOLIN SODIUM 2 G/100ML
2 INJECTION, SOLUTION INTRAVENOUS EVERY 8 HOURS
Status: COMPLETED | OUTPATIENT
Start: 2024-04-01 | End: 2024-04-02

## 2024-04-01 RX ORDER — FENTANYL CITRATE 50 UG/ML
INJECTION, SOLUTION INTRAMUSCULAR; INTRAVENOUS AS NEEDED
Status: DISCONTINUED | OUTPATIENT
Start: 2024-04-01 | End: 2024-04-01

## 2024-04-01 RX ORDER — ONDANSETRON HYDROCHLORIDE 2 MG/ML
4 INJECTION, SOLUTION INTRAVENOUS ONCE AS NEEDED
Status: DISCONTINUED | OUTPATIENT
Start: 2024-04-01 | End: 2024-04-01 | Stop reason: HOSPADM

## 2024-04-01 RX ORDER — ALBUTEROL SULFATE 0.83 MG/ML
2.5 SOLUTION RESPIRATORY (INHALATION) ONCE AS NEEDED
Status: DISCONTINUED | OUTPATIENT
Start: 2024-04-01 | End: 2024-04-01 | Stop reason: HOSPADM

## 2024-04-01 RX ORDER — CEFAZOLIN SODIUM 2 G/100ML
2 INJECTION, SOLUTION INTRAVENOUS ONCE
Status: COMPLETED | OUTPATIENT
Start: 2024-04-01 | End: 2024-04-01

## 2024-04-01 RX ORDER — PROPOFOL 10 MG/ML
INJECTION, EMULSION INTRAVENOUS AS NEEDED
Status: DISCONTINUED | OUTPATIENT
Start: 2024-04-01 | End: 2024-04-01

## 2024-04-01 RX ORDER — SODIUM CHLORIDE, SODIUM LACTATE, POTASSIUM CHLORIDE, CALCIUM CHLORIDE 600; 310; 30; 20 MG/100ML; MG/100ML; MG/100ML; MG/100ML
100 INJECTION, SOLUTION INTRAVENOUS CONTINUOUS
Status: DISCONTINUED | OUTPATIENT
Start: 2024-04-01 | End: 2024-04-01 | Stop reason: HOSPADM

## 2024-04-01 RX ORDER — CELECOXIB 200 MG/1
200 CAPSULE ORAL ONCE
Status: COMPLETED | OUTPATIENT
Start: 2024-04-01 | End: 2024-04-01

## 2024-04-01 RX ORDER — ONDANSETRON HYDROCHLORIDE 2 MG/ML
4 INJECTION, SOLUTION INTRAVENOUS EVERY 8 HOURS PRN
Status: DISCONTINUED | OUTPATIENT
Start: 2024-04-01 | End: 2024-04-02 | Stop reason: HOSPADM

## 2024-04-01 RX ORDER — DEXTROSE 50 % IN WATER (D50W) INTRAVENOUS SYRINGE
25
Status: DISCONTINUED | OUTPATIENT
Start: 2024-04-01 | End: 2024-04-02 | Stop reason: HOSPADM

## 2024-04-01 RX ADMIN — Medication 100 MCG: at 14:00

## 2024-04-01 RX ADMIN — ASPIRIN 81 MG: 81 TABLET, COATED ORAL at 21:17

## 2024-04-01 RX ADMIN — MIDAZOLAM 2 MG: 1 INJECTION INTRAMUSCULAR; INTRAVENOUS at 12:17

## 2024-04-01 RX ADMIN — SODIUM CHLORIDE, POTASSIUM CHLORIDE, SODIUM LACTATE AND CALCIUM CHLORIDE 100 ML/HR: 600; 310; 30; 20 INJECTION, SOLUTION INTRAVENOUS at 11:15

## 2024-04-01 RX ADMIN — ACETAMINOPHEN 650 MG: 325 TABLET ORAL at 18:03

## 2024-04-01 RX ADMIN — FAMOTIDINE 40 MG: 20 TABLET, FILM COATED ORAL at 21:17

## 2024-04-01 RX ADMIN — ACETAMINOPHEN 650 MG: 325 TABLET ORAL at 11:15

## 2024-04-01 RX ADMIN — CEFAZOLIN SODIUM 2 G: 2 INJECTION, SOLUTION INTRAVENOUS at 22:37

## 2024-04-01 RX ADMIN — SIMVASTATIN 10 MG: 10 TABLET, FILM COATED ORAL at 16:34

## 2024-04-01 RX ADMIN — PROPOFOL 500 MG: 10 INJECTION, EMULSION INTRAVENOUS at 13:22

## 2024-04-01 RX ADMIN — FENTANYL CITRATE 50 MCG: 50 INJECTION, SOLUTION INTRAMUSCULAR; INTRAVENOUS at 13:16

## 2024-04-01 RX ADMIN — TRANEXAMIC ACID 1950 MG: 650 TABLET ORAL at 21:24

## 2024-04-01 RX ADMIN — FENTANYL CITRATE 50 MCG: 50 INJECTION, SOLUTION INTRAMUSCULAR; INTRAVENOUS at 13:24

## 2024-04-01 RX ADMIN — SODIUM CHLORIDE, POTASSIUM CHLORIDE, SODIUM LACTATE AND CALCIUM CHLORIDE: 600; 310; 30; 20 INJECTION, SOLUTION INTRAVENOUS at 13:07

## 2024-04-01 RX ADMIN — METFORMIN HYDROCHLORIDE 1000 MG: 1000 TABLET ORAL at 16:36

## 2024-04-01 RX ADMIN — AMITRIPTYLINE HYDROCHLORIDE 25 MG: 25 TABLET, FILM COATED ORAL at 21:17

## 2024-04-01 RX ADMIN — VALSARTAN 160 MG: 160 TABLET, FILM COATED ORAL at 16:34

## 2024-04-01 RX ADMIN — TRANEXAMIC ACID 1950 MG: 650 TABLET ORAL at 11:13

## 2024-04-01 RX ADMIN — CEFAZOLIN SODIUM 2 G: 2 INJECTION, SOLUTION INTRAVENOUS at 13:30

## 2024-04-01 RX ADMIN — CELECOXIB 200 MG: 200 CAPSULE ORAL at 11:15

## 2024-04-01 RX ADMIN — DOCUSATE SODIUM 100 MG: 100 CAPSULE, LIQUID FILLED ORAL at 21:17

## 2024-04-01 RX ADMIN — SODIUM CHLORIDE, POTASSIUM CHLORIDE, SODIUM LACTATE AND CALCIUM CHLORIDE 50 ML/HR: 600; 310; 30; 20 INJECTION, SOLUTION INTRAVENOUS at 16:37

## 2024-04-01 RX ADMIN — Medication 200 MCG: at 14:27

## 2024-04-01 RX ADMIN — INSULIN LISPRO 15 UNITS: 100 INJECTION, SUSPENSION SUBCUTANEOUS at 18:03

## 2024-04-01 SDOH — SOCIAL STABILITY: SOCIAL INSECURITY: ARE YOU OR HAVE YOU BEEN THREATENED OR ABUSED PHYSICALLY, EMOTIONALLY, OR SEXUALLY BY ANYONE?: NO

## 2024-04-01 SDOH — SOCIAL STABILITY: SOCIAL INSECURITY: HAS ANYONE EVER THREATENED TO HURT YOUR FAMILY OR YOUR PETS?: NO

## 2024-04-01 SDOH — SOCIAL STABILITY: SOCIAL INSECURITY: DO YOU FEEL ANYONE HAS EXPLOITED OR TAKEN ADVANTAGE OF YOU FINANCIALLY OR OF YOUR PERSONAL PROPERTY?: NO

## 2024-04-01 SDOH — SOCIAL STABILITY: SOCIAL INSECURITY: HAVE YOU HAD THOUGHTS OF HARMING ANYONE ELSE?: NO

## 2024-04-01 SDOH — SOCIAL STABILITY: SOCIAL INSECURITY: ABUSE: ADULT

## 2024-04-01 SDOH — SOCIAL STABILITY: SOCIAL INSECURITY: DO YOU FEEL UNSAFE GOING BACK TO THE PLACE WHERE YOU ARE LIVING?: NO

## 2024-04-01 SDOH — SOCIAL STABILITY: SOCIAL INSECURITY: ARE THERE ANY APPARENT SIGNS OF INJURIES/BEHAVIORS THAT COULD BE RELATED TO ABUSE/NEGLECT?: NO

## 2024-04-01 SDOH — SOCIAL STABILITY: SOCIAL INSECURITY: DOES ANYONE TRY TO KEEP YOU FROM HAVING/CONTACTING OTHER FRIENDS OR DOING THINGS OUTSIDE YOUR HOME?: NO

## 2024-04-01 SDOH — SOCIAL STABILITY: SOCIAL INSECURITY: WERE YOU ABLE TO COMPLETE ALL THE BEHAVIORAL HEALTH SCREENINGS?: YES

## 2024-04-01 ASSESSMENT — COGNITIVE AND FUNCTIONAL STATUS - GENERAL
HELP NEEDED FOR BATHING: A LITTLE
PATIENT BASELINE BEDBOUND: NO
MOVING TO AND FROM BED TO CHAIR: A LITTLE
STANDING UP FROM CHAIR USING ARMS: A LITTLE
MOBILITY SCORE: 16
TURNING FROM BACK TO SIDE WHILE IN FLAT BAD: A LITTLE
MOVING FROM LYING ON BACK TO SITTING ON SIDE OF FLAT BED WITH BEDRAILS: A LITTLE
WALKING IN HOSPITAL ROOM: A LOT
TOILETING: A LITTLE
CLIMB 3 TO 5 STEPS WITH RAILING: A LOT
MOVING TO AND FROM BED TO CHAIR: A LITTLE
DRESSING REGULAR LOWER BODY CLOTHING: A LITTLE
MOBILITY SCORE: 16
MOBILITY SCORE: 18
TURNING FROM BACK TO SIDE WHILE IN FLAT BAD: A LITTLE
MOVING TO AND FROM BED TO CHAIR: A LITTLE
CLIMB 3 TO 5 STEPS WITH RAILING: A LOT
STANDING UP FROM CHAIR USING ARMS: A LITTLE
HELP NEEDED FOR BATHING: A LITTLE
WALKING IN HOSPITAL ROOM: A LITTLE
CLIMB 3 TO 5 STEPS WITH RAILING: A LITTLE
DAILY ACTIVITIY SCORE: 21
TURNING FROM BACK TO SIDE WHILE IN FLAT BAD: A LITTLE
DAILY ACTIVITIY SCORE: 21
WALKING IN HOSPITAL ROOM: A LOT
MOVING FROM LYING ON BACK TO SITTING ON SIDE OF FLAT BED WITH BEDRAILS: A LITTLE
TOILETING: A LITTLE
STANDING UP FROM CHAIR USING ARMS: A LITTLE
DRESSING REGULAR LOWER BODY CLOTHING: A LITTLE
MOVING FROM LYING ON BACK TO SITTING ON SIDE OF FLAT BED WITH BEDRAILS: A LITTLE

## 2024-04-01 ASSESSMENT — PAIN - FUNCTIONAL ASSESSMENT
PAIN_FUNCTIONAL_ASSESSMENT: 0-10

## 2024-04-01 ASSESSMENT — PAIN SCALES - GENERAL
PAINLEVEL_OUTOF10: 0 - NO PAIN
PAINLEVEL_OUTOF10: 3
PAINLEVEL_OUTOF10: 0 - NO PAIN

## 2024-04-01 ASSESSMENT — LIFESTYLE VARIABLES
HOW OFTEN DO YOU HAVE 6 OR MORE DRINKS ON ONE OCCASION: NEVER
HOW MANY STANDARD DRINKS CONTAINING ALCOHOL DO YOU HAVE ON A TYPICAL DAY: PATIENT DOES NOT DRINK
AUDIT-C TOTAL SCORE: 0
HOW MANY STANDARD DRINKS CONTAINING ALCOHOL DO YOU HAVE ON A TYPICAL DAY: PATIENT DOES NOT DRINK
HOW OFTEN DO YOU HAVE 6 OR MORE DRINKS ON ONE OCCASION: NEVER
HOW OFTEN DO YOU HAVE A DRINK CONTAINING ALCOHOL: NEVER
SKIP TO QUESTIONS 9-10: 1
AUDIT-C TOTAL SCORE: 0

## 2024-04-01 ASSESSMENT — ACTIVITIES OF DAILY LIVING (ADL)
FEEDING YOURSELF: INDEPENDENT
TOILETING: NEEDS ASSISTANCE
WALKS IN HOME: NEEDS ASSISTANCE
BATHING: NEEDS ASSISTANCE
GROOMING: INDEPENDENT
HEARING - RIGHT EAR: FUNCTIONAL
HEARING - LEFT EAR: FUNCTIONAL
JUDGMENT_ADEQUATE_SAFELY_COMPLETE_DAILY_ACTIVITIES: YES
PATIENT'S MEMORY ADEQUATE TO SAFELY COMPLETE DAILY ACTIVITIES?: YES
LACK_OF_TRANSPORTATION: NO
ADEQUATE_TO_COMPLETE_ADL: YES
DRESSING YOURSELF: NEEDS ASSISTANCE

## 2024-04-01 ASSESSMENT — COLUMBIA-SUICIDE SEVERITY RATING SCALE - C-SSRS
6. HAVE YOU EVER DONE ANYTHING, STARTED TO DO ANYTHING, OR PREPARED TO DO ANYTHING TO END YOUR LIFE?: NO
1. IN THE PAST MONTH, HAVE YOU WISHED YOU WERE DEAD OR WISHED YOU COULD GO TO SLEEP AND NOT WAKE UP?: NO
2. HAVE YOU ACTUALLY HAD ANY THOUGHTS OF KILLING YOURSELF?: NO

## 2024-04-01 ASSESSMENT — PATIENT HEALTH QUESTIONNAIRE - PHQ9
1. LITTLE INTEREST OR PLEASURE IN DOING THINGS: NOT AT ALL
SUM OF ALL RESPONSES TO PHQ9 QUESTIONS 1 & 2: 0
2. FEELING DOWN, DEPRESSED OR HOPELESS: NOT AT ALL

## 2024-04-01 NOTE — DISCHARGE INSTRUCTIONS
"    Knee Replacement Discharge Instructions:   Dr. Steffen Frost    Physical Therapy / Range of Motion:    Once you are discharged from the hospital, whether you go home or to a rehabilitation facility, you should have therapy. The minimum for a knee replacement is two-three times per week.  If you find that either at home or in a rehabilitation facility, you are not receiving the appropriate amount of therapy, please call our office immediately. Therapy CANNOT be postponed or delayed!  Therapy should be done by patient on days without appointments.    Knee replacements should ideally achieve 90 degrees of flexion by 2 weeks from surgery.  Ask your therapist after each session \"What is my RANGE OF MOTION?\"  Your physician will be asking you this question at your first post-operative appointment, and each following appointment.  It is also important to work on getting the knee straight and at rest attempt to keep the knee as straight as possible.    Discharge to rehab:  If you go to a rehabilitation facility, discharge to home is determined by the specific staff at the facility when they deem you are safe to return home.  Your surgeon and his staff are not allowed to make this decision. Please inquire with the facility therapist, , and medical personnel.      Discharge to home:  Eventually, therapy will progress to an outpatient setting (ideally around 2 weeks from surgery), where you physically go to a therapy facility, either here at [facility] or somewhere close to your home. This will typically continue for at least six weeks following your surgery.  In certain cases, this may be longer depending on your progress.     Medications:  You have been prescribed medication to prevent blood clots, please follow the instructions and dosage information you were given. Please wear the TAURUS hose stockings that you were given to help prevent blood clots for two weeks postoperatively, and do your " exercises after surgery as these will help reduce your risk of blood clots.     Prescriptions will be given to you upon discharge for pain medicine as well as blood thinning medication. Please remember to take your pain medication as directed. It is very important that you take your short-acting pain medication one hour before scheduled physical therapy. This will allow you to participate aggressively and achieve the necessary goals.     -Every effort must be made to prevent an infection in your artificial joint.  EVERY dentist or doctor that works with you should know that you have an artificial joint.  Antibiotics MUST be used before and after ANY dental procedure.    If you need a refill, please notify the office at LEAST 48 hours before you will be out of your medication. Some pain medications cannot be called in to a pharmacy and have to be printed on paper and picked up at the office. Any refills on pain medication need to be requested during clinic office hours, 8-5 on Mon-Fri.  We cannot refill pain medications on the weekend.    Wound Care:  Leave waterproof dressing in place.  As long as the Aquacel dressing is dry, intact, and occlusive at the borders, you may shower as the dressing is waterproof.     You may reinforce with other dressing materials as needed (gauze, ACE wraps, etc.) if drainage is noted, then please contact us.    May remove  the dressing at 7 days post-op.  If you are discharged to a rehab facility, the nursing staff may remove your Aquacel dressing at 7 days post-op.     You may shower normally, allowing water to run over the incision site, at ~14 days.  DO NOT RUB OR SCRUB INCISION. NO SOAKING IN A TUB OR STANDING WATER UNTIL INCISION IS WELL-HEALED AND SCABS HAVE DISAPPEARED.    -Do not apply any lotions, creams, ointments, peroxide, betadine or gels to incision and surrounding area.          -Apply ice to affected area as tolerated.      Driving:  Must be off of narcotic pain  medication and have good leg control! Approximately right le-6 weeks and left le weeks.  Please discuss with Dr. Frost at first post-op visit prior to resuming.     Follow-up Appointments:   Please call your surgeon's office if you are unsure about your post-op appointments. Your first post-operative appointment is made prior to surgery.     Prior, during, and after your surgery and hospital stay, please do not hesitate to call our office with any questions or concerns.    Our staff will be happy to assist you in any possible way during your personal journey through joint replacement.

## 2024-04-01 NOTE — CARE PLAN
The patient's goals for the shift include pain control    The clinical goals for the shift include remain HDI

## 2024-04-01 NOTE — ANESTHESIA POSTPROCEDURE EVALUATION
Patient: Dalton Champion    Procedure Summary       Date: 04/01/24 Room / Location: [facility] OR 10 / Virtual [facility] OR    Anesthesia Start: 1305 Anesthesia Stop: 1459    Procedure: Total Knee Arthroplasty (Right: Knee) Diagnosis:       Unilateral primary osteoarthritis, right knee      (Unilateral primary osteoarthritis, right knee [M17.11])    Surgeons: Steffen Frost MD Responsible Provider: Jeffrey Sanchez DO    Anesthesia Type: spinal, regional ASA Status: 3            Anesthesia Type: spinal, regional    Vitals Value Taken Time   /58 04/01/24 1459   Temp 363 04/01/24 1459   Pulse 80 04/01/24 1459   Resp 16 04/01/24 1459   SpO2 95 % 04/01/24 1458   Vitals shown include unvalidated device data.    Anesthesia Post Evaluation    Patient location during evaluation: PACU  Patient participation: complete - patient participated  Level of consciousness: awake and alert  Pain management: satisfactory to patient  Airway patency: patent  Cardiovascular status: acceptable  Respiratory status: acceptable, room air and nonlabored ventilation  Hydration status: acceptable  Postoperative Nausea and Vomiting: none        No notable events documented.

## 2024-04-01 NOTE — ANESTHESIA PROCEDURE NOTES
Peripheral Block    Patient location during procedure: pre-op  Start time: 4/1/2024 12:17 PM  End time: 4/1/2024 12:20 PM  Reason for block: at surgeon's request and post-op pain management  Staffing  Performed: attending   Authorized by: Jeffrey Sanchez DO    Performed by: Jeffrey Sanchez DO  Preanesthetic Checklist  Completed: patient identified, IV checked, site marked, risks and benefits discussed, surgical consent, monitors and equipment checked, pre-op evaluation and timeout performed   Timeout performed at: 4/1/2024 12:17 PM  Peripheral Block  Patient position: laying flat  Prep: ChloraPrep  Patient monitoring: heart rate and continuous pulse ox  Block type: adductor canal  Laterality: right  Injection technique: single-shot  Guidance: ultrasound guided  Local infiltration: ropivacaine  Infiltration strength: 0.5 %  Dose: 20 mL  Needle  Needle type: pencil-tip   Needle gauge: 21 G  Needle localization: ultrasound guidance     image stored in chart  Assessment  Injection assessment: negative aspiration for heme, no paresthesia on injection, incremental injection and local visualized surrounding nerve on ultrasound

## 2024-04-01 NOTE — ANESTHESIA PREPROCEDURE EVALUATION
Patient: Dalton Champion    Procedure Information       Date/Time: 04/01/24 1230    Procedure: Total Knee Arthroplasty (Right: Knee) - BLOCK PER TKA PROTOCOL    Location: [facility] OR  / [facility] OR    Surgeons: Steffen Frost MD            Relevant Problems   Cardiac   (+) Hyperlipidemia      Neuro   (+) Cervical radiculopathy, chronic   (+) Lumbar radiculopathy   (+) Sciatica of right side      GI   (+) GERD (gastroesophageal reflux disease)      Endocrine   (+) Class 1 obesity due to excess calories with serious comorbidity and body mass index (BMI) of 31.0 to 31.9 in adult   (+) Type 2 diabetes mellitus without complication, with long-term current use of insulin (CMS/[facility])      Musculoskeletal   (+) Osteoarthritis of knee   (+) Osteoarthritis of multiple joints   (+) Unilateral primary osteoarthritis, right knee      Skin   (+) Basal cell carcinoma of skin of other part of trunk       Clinical information reviewed:   Tobacco  Allergies  Meds   Med Hx  Surg Hx   Fam Hx  Soc Hx        NPO Detail:  NPO/Void Status  Carbohydrate Drink Given Prior to Surgery? : N  Date of Last Liquid: 03/31/24  Time of Last Liquid: 2300  Date of Last Solid: 03/31/24  Time of Last Solid: 2300  Last Intake Type: Food  Time of Last Void: 0930         Physical Exam    Airway  Mallampati: III  TM distance: >3 FB     Cardiovascular   Rhythm: regular  Rate: normal     Dental - normal exam     Pulmonary - normal exam     Abdominal        Anesthesia Plan    History of general anesthesia?: yes  History of complications of general anesthesia?: no    ASA 3     spinal and regional     intravenous induction   Postoperative administration of opioids is intended.  Anesthetic plan and risks discussed with patient.    Plan discussed with [facility].

## 2024-04-01 NOTE — ANESTHESIA PROCEDURE NOTES
Spinal Block    Patient location during procedure: OR  Start time: 4/1/2024 1:17 PM  End time: 4/1/2024 1:20 PM  Reason for block: primary anesthetic  Staffing  Performed: RICK   Authorized by: Jeffrey Sanchez DO    Performed by: RICK Plaza    Preanesthetic Checklist  Completed: patient identified, IV checked, risks and benefits discussed, surgical consent, pre-op evaluation, timeout performed and sterile techniques followed  Block Timeout  RN/Licensed healthcare professional reads aloud to the Anesthesia provider and entire team: Patient identity, procedure with side and site, patient position, and as applicable the availability of implants/special equipment/special requirements.  Patient on coagulant treatment: no  Timeout performed at:   Spinal Block  Patient position: sitting  Prep: ChloraPrep  Sterility prep: drape, gloves and hand hygiene  Sedation level: moderate sedation  Patient monitoring: blood pressure and continuous pulse oximetry  Approach: midline  Vertebral space: L4-5  Injection technique: single-shot  Needle  Needle type: pencil-point   Needle gauge: 24 G  Needle length: 3.5 in  Free flowing CSF: yes    Assessment  Sensory level: T6  Block outcome: patient comfortable  Procedure assessment: patient sedated but conversant throughout procedure

## 2024-04-02 ENCOUNTER — HOME HEALTH ADMISSION (OUTPATIENT)
Dept: HOME HEALTH SERVICES | Facility: HOME HEALTH | Age: 69
End: 2024-04-02
Payer: MEDICARE

## 2024-04-02 ENCOUNTER — DOCUMENTATION (OUTPATIENT)
Dept: HOME HEALTH SERVICES | Facility: HOME HEALTH | Age: 69
End: 2024-04-02
Payer: MEDICARE

## 2024-04-02 VITALS
RESPIRATION RATE: 18 BRPM | HEIGHT: 66 IN | BODY MASS INDEX: 33.43 KG/M2 | HEART RATE: 81 BPM | OXYGEN SATURATION: 99 % | SYSTOLIC BLOOD PRESSURE: 159 MMHG | TEMPERATURE: 97.7 F | DIASTOLIC BLOOD PRESSURE: 84 MMHG | WEIGHT: 208 LBS

## 2024-04-02 LAB
ANION GAP SERPL CALC-SCNC: 13 MMOL/L (ref 10–20)
BUN SERPL-MCNC: 23 MG/DL (ref 6–23)
CALCIUM SERPL-MCNC: 7.6 MG/DL (ref 8.6–10.3)
CHLORIDE SERPL-SCNC: 101 MMOL/L (ref 98–107)
CO2 SERPL-SCNC: 20 MMOL/L (ref 21–32)
CREAT SERPL-MCNC: 1.27 MG/DL (ref 0.5–1.3)
EGFRCR SERPLBLD CKD-EPI 2021: 60 ML/MIN/1.73M*2
ERYTHROCYTE [DISTWIDTH] IN BLOOD BY AUTOMATED COUNT: 13.8 % (ref 11.5–14.5)
GLUCOSE BLD MANUAL STRIP-MCNC: 141 MG/DL (ref 74–99)
GLUCOSE SERPL-MCNC: 147 MG/DL (ref 74–99)
HCT VFR BLD AUTO: 36.5 % (ref 41–52)
HGB BLD-MCNC: 11.7 G/DL (ref 13.5–17.5)
MCH RBC QN AUTO: 29.3 PG (ref 26–34)
MCHC RBC AUTO-ENTMCNC: 32.1 G/DL (ref 32–36)
MCV RBC AUTO: 91 FL (ref 80–100)
NRBC BLD-RTO: 0 /100 WBCS (ref 0–0)
PLATELET # BLD AUTO: 216 X10*3/UL (ref 150–450)
POTASSIUM SERPL-SCNC: 4.5 MMOL/L (ref 3.5–5.3)
RBC # BLD AUTO: 4 X10*6/UL (ref 4.5–5.9)
SODIUM SERPL-SCNC: 129 MMOL/L (ref 136–145)
WBC # BLD AUTO: 9.1 X10*3/UL (ref 4.4–11.3)

## 2024-04-02 PROCEDURE — 7100000011 HC EXTENDED STAY RECOVERY HOURLY - NURSING UNIT

## 2024-04-02 PROCEDURE — 36415 COLL VENOUS BLD VENIPUNCTURE: CPT | Performed by: ORTHOPAEDIC SURGERY

## 2024-04-02 PROCEDURE — 2500000002 HC RX 250 W HCPCS SELF ADMINISTERED DRUGS (ALT 637 FOR MEDICARE OP, ALT 636 FOR OP/ED): Performed by: PHYSICIAN ASSISTANT

## 2024-04-02 PROCEDURE — 97110 THERAPEUTIC EXERCISES: CPT | Mod: GP,CQ

## 2024-04-02 PROCEDURE — 85027 COMPLETE CBC AUTOMATED: CPT | Performed by: ORTHOPAEDIC SURGERY

## 2024-04-02 PROCEDURE — 97116 GAIT TRAINING THERAPY: CPT | Mod: GP,CQ

## 2024-04-02 PROCEDURE — 2500000001 HC RX 250 WO HCPCS SELF ADMINISTERED DRUGS (ALT 637 FOR MEDICARE OP): Performed by: PHYSICIAN ASSISTANT

## 2024-04-02 PROCEDURE — 2500000001 HC RX 250 WO HCPCS SELF ADMINISTERED DRUGS (ALT 637 FOR MEDICARE OP): Performed by: ORTHOPAEDIC SURGERY

## 2024-04-02 PROCEDURE — 97165 OT EVAL LOW COMPLEX 30 MIN: CPT | Mod: GO | Performed by: OCCUPATIONAL THERAPIST

## 2024-04-02 PROCEDURE — 80048 BASIC METABOLIC PNL TOTAL CA: CPT | Performed by: ORTHOPAEDIC SURGERY

## 2024-04-02 PROCEDURE — 2500000004 HC RX 250 GENERAL PHARMACY W/ HCPCS (ALT 636 FOR OP/ED): Performed by: ORTHOPAEDIC SURGERY

## 2024-04-02 PROCEDURE — 2500000002 HC RX 250 W HCPCS SELF ADMINISTERED DRUGS (ALT 637 FOR MEDICARE OP, ALT 636 FOR OP/ED): Performed by: ORTHOPAEDIC SURGERY

## 2024-04-02 PROCEDURE — 99232 SBSQ HOSP IP/OBS MODERATE 35: CPT | Performed by: PHYSICIAN ASSISTANT

## 2024-04-02 PROCEDURE — 82947 ASSAY GLUCOSE BLOOD QUANT: CPT | Mod: 59

## 2024-04-02 RX ORDER — CYCLOBENZAPRINE HCL 10 MG
10 TABLET ORAL 3 TIMES DAILY PRN
Qty: 15 TABLET | Refills: 0 | Status: SHIPPED | OUTPATIENT
Start: 2024-04-02 | End: 2024-04-04 | Stop reason: SDUPTHER

## 2024-04-02 RX ORDER — OXYCODONE HYDROCHLORIDE 5 MG/1
5 TABLET ORAL EVERY 6 HOURS PRN
Qty: 28 TABLET | Refills: 0 | Status: SHIPPED | OUTPATIENT
Start: 2024-04-02 | End: 2024-04-04 | Stop reason: SDUPTHER

## 2024-04-02 RX ORDER — ASPIRIN 81 MG/1
81 TABLET ORAL 2 TIMES DAILY
Qty: 60 TABLET | Refills: 0 | Status: SHIPPED | OUTPATIENT
Start: 2024-04-02 | End: 2024-05-02

## 2024-04-02 RX ORDER — DOCUSATE SODIUM 100 MG/1
100 CAPSULE, LIQUID FILLED ORAL 2 TIMES DAILY
Qty: 20 CAPSULE | Refills: 0 | Status: SHIPPED | OUTPATIENT
Start: 2024-04-02 | End: 2024-04-12

## 2024-04-02 RX ADMIN — EMPAGLIFLOZIN 10 MG: 10 TABLET, FILM COATED ORAL at 08:23

## 2024-04-02 RX ADMIN — SODIUM CHLORIDE, POTASSIUM CHLORIDE, SODIUM LACTATE AND CALCIUM CHLORIDE 50 ML/HR: 600; 310; 30; 20 INJECTION, SOLUTION INTRAVENOUS at 00:35

## 2024-04-02 RX ADMIN — SIMVASTATIN 10 MG: 10 TABLET, FILM COATED ORAL at 08:23

## 2024-04-02 RX ADMIN — ACETAMINOPHEN 650 MG: 325 TABLET ORAL at 00:32

## 2024-04-02 RX ADMIN — ONDANSETRON 4 MG: 2 INJECTION INTRAMUSCULAR; INTRAVENOUS at 03:23

## 2024-04-02 RX ADMIN — ACETAMINOPHEN 650 MG: 325 TABLET ORAL at 06:02

## 2024-04-02 RX ADMIN — ACETAMINOPHEN 650 MG: 325 TABLET ORAL at 11:52

## 2024-04-02 RX ADMIN — PIOGLITAZONE 30 MG: 15 TABLET ORAL at 08:24

## 2024-04-02 RX ADMIN — MORPHINE SULFATE 2 MG: 2 INJECTION, SOLUTION INTRAMUSCULAR; INTRAVENOUS at 03:14

## 2024-04-02 RX ADMIN — OXYCODONE HYDROCHLORIDE 5 MG: 5 TABLET ORAL at 10:00

## 2024-04-02 RX ADMIN — CEFAZOLIN SODIUM 2 G: 2 INJECTION, SOLUTION INTRAVENOUS at 06:03

## 2024-04-02 RX ADMIN — INSULIN LISPRO 30 UNITS: 100 INJECTION, SUSPENSION SUBCUTANEOUS at 07:00

## 2024-04-02 RX ADMIN — VALSARTAN 160 MG: 160 TABLET, FILM COATED ORAL at 08:23

## 2024-04-02 RX ADMIN — OXYCODONE HYDROCHLORIDE 5 MG: 5 TABLET ORAL at 00:35

## 2024-04-02 RX ADMIN — ASPIRIN 81 MG: 81 TABLET, COATED ORAL at 08:23

## 2024-04-02 RX ADMIN — TRANEXAMIC ACID 1950 MG: 650 TABLET ORAL at 06:02

## 2024-04-02 RX ADMIN — DOCUSATE SODIUM 100 MG: 100 CAPSULE, LIQUID FILLED ORAL at 08:23

## 2024-04-02 RX ADMIN — METFORMIN HYDROCHLORIDE 1000 MG: 1000 TABLET ORAL at 06:05

## 2024-04-02 ASSESSMENT — PAIN SCALES - GENERAL
PAINLEVEL_OUTOF10: 0 - NO PAIN
PAINLEVEL_OUTOF10: 3
PAINLEVEL_OUTOF10: 3
PAINLEVEL_OUTOF10: 4
PAINLEVEL_OUTOF10: 7
PAINLEVEL_OUTOF10: 3
PAINLEVEL_OUTOF10: 5 - MODERATE PAIN
PAINLEVEL_OUTOF10: 5 - MODERATE PAIN

## 2024-04-02 ASSESSMENT — COGNITIVE AND FUNCTIONAL STATUS - GENERAL
DAILY ACTIVITIY SCORE: 19
TURNING FROM BACK TO SIDE WHILE IN FLAT BAD: A LITTLE
MOVING FROM LYING ON BACK TO SITTING ON SIDE OF FLAT BED WITH BEDRAILS: A LITTLE
WALKING IN HOSPITAL ROOM: A LITTLE
TOILETING: A LITTLE
STANDING UP FROM CHAIR USING ARMS: A LITTLE
WALKING IN HOSPITAL ROOM: A LITTLE
DRESSING REGULAR UPPER BODY CLOTHING: A LITTLE
MOVING TO AND FROM BED TO CHAIR: A LITTLE
CLIMB 3 TO 5 STEPS WITH RAILING: A LITTLE
HELP NEEDED FOR BATHING: A LITTLE
CLIMB 3 TO 5 STEPS WITH RAILING: A LITTLE
TURNING FROM BACK TO SIDE WHILE IN FLAT BAD: A LITTLE
MOBILITY SCORE: 18
MOVING FROM LYING ON BACK TO SITTING ON SIDE OF FLAT BED WITH BEDRAILS: A LITTLE
STANDING UP FROM CHAIR USING ARMS: A LITTLE
PERSONAL GROOMING: A LITTLE
MOVING TO AND FROM BED TO CHAIR: A LITTLE
MOBILITY SCORE: 18
DRESSING REGULAR LOWER BODY CLOTHING: A LITTLE

## 2024-04-02 ASSESSMENT — PAIN DESCRIPTION - LOCATION: LOCATION: KNEE

## 2024-04-02 ASSESSMENT — PAIN - FUNCTIONAL ASSESSMENT
PAIN_FUNCTIONAL_ASSESSMENT: 0-10

## 2024-04-02 ASSESSMENT — PAIN DESCRIPTION - ORIENTATION: ORIENTATION: RIGHT

## 2024-04-02 NOTE — PROGRESS NOTES
04/02/24 1027   Discharge Planning   Living Arrangements Spouse/significant other   Support Systems Spouse/significant other   Type of Residence Private residence   Home or Post Acute Services In home services   Type of Home Care Services Home OT;Home PT   Patient expects to be discharged to: Home with [facility]   Does the patient need discharge transport arranged? No   Patient Choice   Patient / Family choosing to utilize agency / facility established prior to hospitalization Yes     Met with patient at bedside. Admitted for right knee replacement. Pt lives with spouse and was independent PTA with no HHC. Pt has a walker. PCP is Dr hSultz. Pt was able to drive and obtain medications. PT AMPA 16. Pt plans to return home with [facility]. Internal referral requested. Family will provide transport home.   1155 Medically ready for discharge. [facility] intake notified. Steffen Frost to follow.

## 2024-04-02 NOTE — DISCHARGE INSTR - ACTIVITY
"     Knee Replacement Discharge Instructions:   Dr. Steffen Frost     Physical Therapy / Range of Motion:     Once you are discharged from the hospital, whether you go home or to a rehabilitation facility, you should have therapy. The minimum for a knee replacement is two-three times per week.  If you find that either at home or in a rehabilitation facility, you are not receiving the appropriate amount of therapy, please call our office immediately. Therapy CANNOT be postponed or delayed!  Therapy should be done by patient on days without appointments.     Knee replacements should ideally achieve 90 degrees of flexion by 2 weeks from surgery.  Ask your therapist after each session \"What is my RANGE OF MOTION?\"  Your physician will be asking you this question at your first post-operative appointment, and each following appointment.  It is also important to work on getting the knee straight and at rest attempt to keep the knee as straight as possible.     Discharge to rehab:  If you go to a rehabilitation facility, discharge to home is determined by the specific staff at the facility when they deem you are safe to return home.  Your surgeon and his staff are not allowed to make this decision. Please inquire with the facility therapist, , and medical personnel.             Discharge to home:  Eventually, therapy will progress to an outpatient setting (ideally around 2 weeks from surgery), where you physically go to a therapy facility, either here at [facility] or somewhere close to your home. This will typically continue for at least six weeks following your surgery.  In certain cases, this may be longer depending on your progress.      Medications:  You have been prescribed medication to prevent blood clots, please follow the instructions and dosage information you were given. Please wear the TAURUS hose stockings that you were given to help prevent blood clots for two weeks postoperatively, and " do your exercises after surgery as these will help reduce your risk of blood clots.      Prescriptions will be given to you upon discharge for pain medicine as well as blood thinning medication. Please remember to take your pain medication as directed. It is very important that you take your short-acting pain medication one hour before scheduled physical therapy. This will allow you to participate aggressively and achieve the necessary goals.      -Every effort must be made to prevent an infection in your artificial joint.  EVERY dentist or doctor that works with you should know that you have an artificial joint.  Antibiotics MUST be used before and after ANY dental procedure.     If you need a refill, please notify the office at LEAST 48 hours before you will be out of your medication. Some pain medications cannot be called in to a pharmacy and have to be printed on paper and picked up at the office. Any refills on pain medication need to be requested during clinic office hours, 8-5 on Mon-Fri.  We cannot refill pain medications on the weekend.     Wound Care:  Leave waterproof dressing in place.  As long as the Aquacel dressing is dry, intact, and occlusive at the borders, you may shower as the dressing is waterproof.      You may reinforce with other dressing materials as needed (gauze, ACE wraps, etc.) if drainage is noted, then please contact us.     May remove  the dressing at 7 days post-op.  If you are discharged to a rehab facility, the nursing staff may remove your Aquacel dressing at 7 days post-op.      You may shower normally, allowing water to run over the incision site, at ~14 days.  DO NOT RUB OR SCRUB INCISION. NO SOAKING IN A TUB OR STANDING WATER UNTIL INCISION IS WELL-HEALED AND SCABS HAVE DISAPPEARED.     -Do not apply any lotions, creams, ointments, peroxide, betadine or gels to incision and surrounding area.          -Apply ice to affected area as tolerated.       Driving:  Must be off of  narcotic pain medication and have good leg control! Approximately right le-6 weeks and left le weeks.  Please discuss with Dr. Frost at first post-op visit prior to resuming.      Follow-up Appointments:   Please call your surgeon's office if you are unsure about your post-op appointments. Your first post-operative appointment is made prior to surgery.      Prior, during, and after your surgery and hospital stay, please do not hesitate to call our office with any questions or concerns.    Our staff will be happy to assist you in any possible way during your personal journey through joint replacement.

## 2024-04-02 NOTE — PROGRESS NOTES
Physical Therapy    Physical Therapy    Physical Therapy Treatment    Patient Name: Dalton Champion  MRN: [id number]  Today's Date: 4/2/2024  Time Calculation  Start Time: 0844  Stop Time: 0922  Time Calculation (min): 38 min        04/02/24 0844   PT  Visit   PT Received On 04/02/24   General   Reason for Referral R TKR   Patient Position Received Bed, 3 rail up   Preferred Learning Style verbal;visual   General Comment Pt agreeable to therapy and cleared for participation   Precautions   LE Weight Bearing Status Weight Bearing as Tolerated   Medical Precautions Fall precautions   Post-Surgical Precautions Right total knee precautions   Pain Assessment   Pain Assessment 0-10   Pain Score 4   Pain Type Surgical pain   Pain Location Knee   Pain Interventions Cold applied   Cognition   Overall Cognitive Status WFL   Orientation Level Oriented X4   Therapeutic Exercise   Therapeutic Exercise Performed Yes   Therapeutic Exercise Activity 1 AP,QS,GS,SLR,HIP ABD, MARCHING,BALL SQUEEZES X 20 B WITH EMPHASIS ON OPERATED LE   Therapeutic Exercise Activity 2 ROM-80 degrees flexion   Ambulation/Gait Training   Ambulation/Gait Training Performed Yes   Ambulation/Gait Training 1   Surface 1 Level tile   Device 1 Rolling walker   Gait Support Devices Gait belt   Assistance 1 Contact guard   Comments/Distance (ft) 1 80', 150'- step to progressing to step through, good safety awareness, decreased justus.   Transfers   Transfer Yes   Transfer 1   Transfer From 1 Chair with arms to   Transfer to 1 Stand   Technique 1 Sit to stand;Stand to sit   Transfer Device 1 Walker;Gait belt   Transfer Level of Assistance 1 Contact guard;Close supervision   Trials/Comments 1 Multiple transfers with min cues for safe hand placement, sequencing and safety. Good follow through   Activity Tolerance   Endurance Endurance does not limit participation in activity   PT Assessment   End of Session Communication Bedside nurse   Assessment Comment Pt  tolerated treatment well, good safety awareness.   End of Session Patient Position Up in chair       Outcome Measures:  [facility] Basic Mobility  Turning from your back to your side while in a flat bed without using bedrails: A little  Moving from lying on your back to sitting on the side of a flat bed without using bedrails: A little  Moving to and from bed to chair (including a wheelchair): A little  Standing up from a chair using your arms (e.g. wheelchair or bedside chair): A little  To walk in hospital room: A little  Climbing 3-5 steps with railing: A little  Basic Mobility - Total Score: 18                             EDUCATION:     Education Documentation  Handouts, taught by Roxi Lima PTA at 4/2/2024 10:39 AM.  Learner: Patient  Readiness: Acceptance  Method: Explanation, Demonstration  Response: Verbalizes Understanding, Demonstrated Understanding    Precautions, taught by Roxi Lima PTA at 4/2/2024 10:39 AM.  Learner: Patient  Readiness: Acceptance  Method: Explanation, Demonstration  Response: Verbalizes Understanding, Demonstrated Understanding    Body Mechanics, taught by Roxi Lima PTA at 4/2/2024 10:39 AM.  Learner: Patient  Readiness: Acceptance  Method: Explanation, Demonstration  Response: Verbalizes Understanding, Demonstrated Understanding    Home Exercise Program, taught by Roxi Lima PTA at 4/2/2024 10:39 AM.  Learner: Patient  Readiness: Acceptance  Method: Explanation, Demonstration  Response: Verbalizes Understanding, Demonstrated Understanding    Mobility Training, taught by Roxi Lima PTA at 4/2/2024 10:39 AM.  Learner: Patient  Readiness: Acceptance  Method: Explanation, Demonstration  Response: Verbalizes Understanding, Demonstrated Understanding    Education Comments  No comments found.        GOALS:  Encounter Problems       Encounter Problems (Active)       Mobility       STG - Patient will ambulate x 250 ft. with w/w, Modif. Indep..   (Progressing)       Start:   04/01/24    Expected End:  04/15/24            STG - Patient will ambulate up and down a curb/step with CGA.   (Progressing)       Start:  04/01/24    Expected End:  04/15/24            Goal 1:  Achieve 0-90 degrees R knee.   (Progressing)       Start:  04/01/24    Expected End:  04/15/24            Goal 2: Demo 3+/5=R Quads. and a G=SLR.   (Progressing)       Start:  04/01/24    Expected End:  04/15/24               PT Transfers       STG - Patient will perform bed mobility Modif. Indep. skill.  (Progressing)       Start:  04/01/24    Expected End:  04/15/24            STG - Patient will transfer sit to and from stand into a w/w, with Modif. Indep. skill.   (Progressing)       Start:  04/01/24    Expected End:  04/15/24               Pain - Adult

## 2024-04-02 NOTE — PROGRESS NOTES
"Dalton Champion is a 69 y.o. male  presenting with Unilateral primary osteoarthritis, right knee.    Subjective   Mr. Champion is sitting up in his chair looking forward to eating breakfast.  He complains of intermittent hiccups.  He describes moderate right knee discomfort this morning.  He is voiding without any issues       Objective     Physical Exam  Vitals reviewed.   HENT:      Head: Normocephalic.      Mouth/Throat:      Mouth: Mucous membranes are moist.      Pharynx: Oropharynx is clear.   Eyes:      Extraocular Movements: Extraocular movements intact.   Cardiovascular:      Rate and Rhythm: Normal rate.   Pulmonary:      Effort: Pulmonary effort is normal.   Abdominal:      Palpations: Abdomen is soft.   Musculoskeletal:      Comments: Right knee dressing is dry and intact.  light touch sensation is intact, ankle dorsiflexion/plantar flexion is intact. DP 2+/2 palpable     Skin:     General: Skin is warm and dry.      Capillary Refill: Capillary refill takes less than 2 seconds.   Neurological:      General: No focal deficit present.      Mental Status: He is alert. Mental status is at baseline.   Psychiatric:         Mood and Affect: Mood normal.         Last Recorded Vitals  Blood pressure 115/84, pulse 76, temperature 36.3 °C (97.3 °F), temperature source Temporal, resp. rate 18, height 1.676 m (5' 6\"), weight 94.3 kg (208 lb), SpO2 98 %.  Intake/Output last 3 Shifts:  I/O last 3 completed shifts:  In: 1662.5 (17.6 mL/kg) [I.V.:1662.5 (17.6 mL/kg)]  Out: 605 (6.4 mL/kg) [Urine:600 (0.2 mL/kg/hr); Blood:5]  Weight: 94.3 kg     Relevant Results      Scheduled medications  acetaminophen, 650 mg, oral, q6h HUMZA  amitriptyline, 25 mg, oral, Nightly  aspirin, 81 mg, oral, BID  blood sugar diagnostic, , subcutaneous, TID  docusate sodium, 100 mg, oral, BID  empagliflozin, 10 mg, oral, Daily  famotidine, 40 mg, oral, Nightly  insulin lispro protamin-lispro, 30 Units, subcutaneous, BID AC  [Held by provider] " insulin regular, 0-5 Units, subcutaneous, TID with meals  metFORMIN, 1,000 mg, oral, q12h  [Held by provider] oxybutynin, 5 mg, oral, BID  pioglitazone, 30 mg, oral, Daily  simvastatin, 10 mg, oral, Daily  valsartan, 160 mg, oral, Daily      Continuous medications  lactated Ringer's, 50 mL/hr, Last Rate: 50 mL/hr (04/02/24 0552)  oxygen, 2 L/min, Last Rate: Stopped (04/01/24 1630)      PRN medications  PRN medications: benzocaine-menthol, bisacodyl, cyclobenzaprine, dextrose, dextrose, diphenhydrAMINE, glucagon, glucagon, HYDROmorphone, morphine, naloxone, ondansetron ODT **OR** ondansetron, oxyCODONE, oxyCODONE  Results for orders placed or performed during the hospital encounter of 04/01/24 (from the past 24 hour(s))   POCT GLUCOSE   Result Value Ref Range    POCT Glucose 89 74 - 99 mg/dL   POCT GLUCOSE   Result Value Ref Range    POCT Glucose 100 (H) 74 - 99 mg/dL   POCT GLUCOSE   Result Value Ref Range    POCT Glucose 198 (H) 74 - 99 mg/dL   CBC   Result Value Ref Range    WBC 9.1 4.4 - 11.3 x10*3/uL    nRBC 0.0 0.0 - 0.0 /100 WBCs    RBC 4.00 (L) 4.50 - 5.90 x10*6/uL    Hemoglobin 11.7 (L) 13.5 - 17.5 g/dL    Hematocrit 36.5 (L) 41.0 - 52.0 %    MCV 91 80 - 100 fL    MCH 29.3 26.0 - 34.0 pg    MCHC 32.1 32.0 - 36.0 g/dL    RDW 13.8 11.5 - 14.5 %    Platelets 216 150 - 450 x10*3/uL   Basic metabolic panel   Result Value Ref Range    Glucose 147 (H) 74 - 99 mg/dL    Sodium 129 (L) 136 - 145 mmol/L    Potassium 4.5 3.5 - 5.3 mmol/L    Chloride 101 98 - 107 mmol/L    Bicarbonate 20 (L) 21 - 32 mmol/L    Anion Gap 13 10 - 20 mmol/L    Urea Nitrogen 23 6 - 23 mg/dL    Creatinine 1.27 0.50 - 1.30 mg/dL    eGFR 60 (L) >60 mL/min/1.73m*2    Calcium 7.6 (L) 8.6 - 10.3 mg/dL   POCT GLUCOSE   Result Value Ref Range    POCT Glucose 141 (H) 74 - 99 mg/dL               ECG 12 Lead    Result Date: 3/15/2024  Normal sinus rhythm Normal ECG When compared with ECG of 06-SEP-2022 09:41, No significant change was found                 Assessment/Plan   Principal Problem:    Unilateral primary osteoarthritis, right knee  Active Problems:    Total knee replacement status, left    POD #1 s/p  right TKA  Continue PT/OT, WBAT right  LE  Reviewed am labs  Multimodal pain regimen  knee dressing to be removed on post op day #7  VTE prophylaxis: aspirin 81mg BID  Will discharge home when appropriate with [facility]  Follow up with Dr. Frost as directed         I spent 25 minutes in the professional and overall care of this patient.      Rubina Peña PA-C

## 2024-04-02 NOTE — PROGRESS NOTES
Occupational Therapy    Evaluation    Patient Name: Dalton Champion  MRN: [id number]  Today's Date: 4/2/2024  Time Calculation  Start Time: 1125  Stop Time: 1146  Time Calculation (min): 21 min  Eval only     Assessment  IP OT Assessment  Prognosis: Good  Medical Staff Made Aware: Yes  End of Session Communication: Bedside nurse  End of Session Patient Position: Up in chair, Alarm on  Patient presents with decline in ADLs, functional transfers, functional mobility and would benefit from OT during acute stay to improve functional independence and safety. Recommend low intensity OT to maximize functional independence and safety.     Plan:  Treatment Interventions: ADL retraining, Functional transfer training, Patient/family training, Equipment evaluation/education  OT Frequency: Daily  OT Discharge Recommendations: Low intensity level of continued care  Equipment Recommended upon Discharge: Wheeled walker (shower chair, reacher ,sock aide, LH shoe horn)  OT - OK to Discharge: Yes from acute care OT services to the next level of care when cleared by medical team      Subjective     Current Problem:  1. Unilateral primary osteoarthritis, right knee        2. S/P total knee arthroplasty, right  aspirin 81 mg EC tablet    cyclobenzaprine (Flexeril) 10 mg tablet    docusate sodium (Colace) 100 mg capsule    oxyCODONE (Roxicodone) 5 mg immediate release tablet    Referral to Home Health          General:  General  Reason for Referral: R TKR  Referred By: AZRA Frost MD  Past Medical History Relevant to Rehab: Admitted 4/1/2024 after having right TKR completed by  Dr Frost.  Family/Caregiver Present: Yes  Patient Position Received: Up in chair, Alarm on  Preferred Learning Style: verbal  General Comment: Patient seated in bedside chair and agreeable to participate in OT evaluation.    Precautions:  LE Weight Bearing Status: Weight Bearing as Tolerated  Medical Precautions: Fall precautions    Pain:  Pain Assessment  Pain  Assessment: 0-10  Pain Score: 5 - Moderate pain  Pain Type: Surgical pain  Pain Location: Knee  Pain Orientation: Right  Pain Interventions: Medication (See MAR), Cold pack, Rest    Objective   Cognition:  Overall Cognitive Status: Within Functional Limits  Orientation Level: Oriented X4    Home Living:  Type of Home: House  Lives With: Spouse  Home Adaptive Equipment: Walker rolling or standard, Cane  Home Layout: One level  Home Access: Stairs to enter without rails  Entrance Stairs-Number of Steps: 1  Bathroom Shower/Tub: Walk-in shower  Bathroom Equipment: Grab bars in shower     Prior Function:  Level of [place]: Independent with homemaking with ambulation  Ambulatory Assistance: Independent  Vocational: Retired  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    ADL:  LE Dressing Assistance: Minimal (don underwear and shorts)  ADL Comments: Educated patient on safety and comp strategies for lower body dressing and patient verbalized understandindg.    Activity Tolerance:  Endurance: Endurance does not limit participation in activity    Bed Mobility/Transfers:      Transfers  Transfer:  (SBA sit <>stand with min verbal cues for proper hand placement and technique)  Educated patient on safety with car transfers and patient verbalized understanding.    Educated patient on safety and use of shower chair for safety and patient verbalized understanding. Educated patient on having [facility] assess shower safety prior to completing first shower and patient verbalized understanding.     Ambulation/Gait Training:  Functional Mobility  Functional Mobility Performed:  (SBA with WW functional mobiltiy task)    Extremities: RUE   RUE : Within Functional Limits and LUE   LUE: Within Functional Limits    Outcome Measures: [facility] Daily Activity  Putting on and taking off regular lower body clothing: A little  Bathing (including washing, rinsing, drying): A little  Putting on and taking off regular  upper body clothing: A little  Toileting, which includes using toilet, bedpan or urinal: A little  Taking care of personal grooming such as brushing teeth: A little  Eating Meals: None  Daily Activity - Total Score: 19      EDUCATION:  Education  Individual(s) Educated: Patient  Education Provided: Fall precautons (safety and comp strategies for lower body dressing, safety with car transfers and shower transfers)  Patient Response to Education: Patient/Caregiver Verbalized Understanding of Information    Goals:   Encounter Problems       Encounter Problems (Active)       Dressings Lower Extremities       STG - Patient to complete lower body dressing independently with AE and comp strategies  (Progressing)       Start:  04/02/24    Expected End:  04/16/24               Functional Balance       STG-Patient will be independent with assistive device dynamic stand task >5 minutes for ADL completion   (Progressing)       Start:  04/02/24    Expected End:  04/16/24               Functional Mobility       STG-Patient will be independent with assistive device functional mobility tasks   (Progressing)       Start:  04/02/24    Expected End:  04/16/24               OT Transfers       STG - Patient will perform car transfers independently demonstrating good safety  (Progressing)       Start:  04/02/24    Expected End:  04/16/24            STG-Patient will be independent with functional transfers demonstrating good safety   (Progressing)       Start:  04/02/24    Expected End:  04/16/24

## 2024-04-02 NOTE — HH CARE COORDINATION
Home Care received a Referral for Physical Therapy and Occupational Therapy. We have processed the referral for a Start of Care on 04/03/2024.     If you have any questions or concerns, please feel free to contact us at [phone]. Follow the prompts, enter your five digit zip code, and you will be directed to your care team on [facility].

## 2024-04-02 NOTE — CARE PLAN
The patient's goals for the shift include pain control    The clinical goals for the shift include pain control & ambulate in the halls more    Pt ready for discharge. Discharge education completed. Pt & pt's wife voiced understanding. Iv removed. Pro hose on,ice pack filled for ride home. Blood glucose monitored & vital signs are stable SEE Flowsheet    Pt awaiting wheelchair ride down to the car to head home with wife

## 2024-04-02 NOTE — PROGRESS NOTES
Physical Therapy    Physical Therapy Evaluation    Patient Name: Dalton Champion  MRN: [id number]  Today's Date: 4/1/2024   Time Calculation  Start Time: 1844  Stop Time: 1916  Time Calculation (min): 32 min    Assessment/Plan   PT Assessment  PT Assessment Results: Decreased strength, Decreased range of motion, Decreased endurance, Decreased mobility, Decreased coordination  Rehab Prognosis: Good  Evaluation/Treatment Tolerance: Patient limited by fatigue  Medical Staff Made Aware: Yes  Strengths: Ability to acquire knowledge  End of Session Communication: Bedside nurse  Assessment Comment: expect better performance tomorrow.  End of Session Patient Position: Bed, 3 rail up, Alarm on  IP OR SWING BED PT PLAN  Inpatient or Swing Bed: Inpatient  PT Plan  Treatment/Interventions: Bed mobility, Transfer training, Gait training, Stair training, Endurance training, Strengthening, Range of motion, Therapeutic exercise, Therapeutic activity, Home exercise program (Issued a handout for a/p, qs, and gs exercises to work on tonight.)  PT Frequency: BID  PT Discharge Recommendations: Low intensity level of continued care  Equipment Recommended upon Discharge: Wheeled walker, Straight cane  PT Recommended Transfer Status: Assist x1  PT - OK to Discharge: Yes (When medically allowed.)    Subjective     Current Problem:  Patient Active Problem List   Diagnosis    Cervical radiculopathy, chronic    Lumbar radiculopathy    Change in bowel habits    Irregular bowel habits    Colon polyp    Type 2 diabetes mellitus without complication, with long-term current use of insulin (CMS/HCC)    Elevated TSH    Fecal incontinence    GERD (gastroesophageal reflux disease)    Hyperlipidemia    Lung nodule    Osteoarthritis of knee    Sciatica of right side    Basal cell carcinoma of skin of other part of trunk    Hemangioma of skin and subcutaneous tissue    Melanocytic nevi of trunk    Neoplasm of uncertain behavior of skin    Other  hypertrophic disorders of the skin    Other melanin hyperpigmentation    Congenital absence of right kidney    Long-term insulin use (CMS/HCC)    Unilateral primary osteoarthritis, right knee    Class 1 obesity due to excess calories with serious comorbidity and body mass index (BMI) of 31.0 to 31.9 in adult    Osteoarthritis of multiple joints    Frequent urination    Stage 3 chronic kidney disease, unspecified whether stage 3a or 3b CKD (CMS/HCC)    Medication dose increased    Encounter to discuss test results    Pre-operative exam    Dietary counseling and surveillance    Total knee replacement status, left       General Visit Information:  General  Reason for Referral: R TKR  Referred By: AZRA Frost MD  Past Medical History Relevant to Rehab: DM 2, O-A, CKD III.  Family/Caregiver Present: Yes (Wife.)  Prior to Session Communication: Bedside nurse  Patient Position Received: Bed, 3 rail up, Alarm on  Preferred Learning Style: verbal, visual, written  General Comment: Has ice, IV, SCD's.    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    Precautions:  Precautions  Medical Precautions: Fall precautions  Post-Surgical Precautions: Right total knee precautions  Precautions Comment: No pivoting.    Vital Signs:     Objective     Pain:  Pain Assessment  Pain Score: 0 - No pain    Cognition:  Cognition  Overall Cognitive Status: Within Functional Limits  Orientation Level: Oriented  X4    General Assessments:      Activity Tolerance  Endurance: Endurance does not limit participation in activity  Sensation  Light Touch: Partial deficits in the RLE  Sensation Comment: feels like his R foot was sliding when standing.  Strength  Strength Comments: 3-/5= R Quads. F= SLR.     Coordination  Movements are Fluid and Coordinated: No  Coordination Comment: see ambulation comment below.  Postural Control  Postural Control: Within Functional Limits          Functional Assessments:     Bed Mobility  Bed Mobility: Yes  Bed Mobility 1  Bed Mobility 1: Supine to sitting, Sitting to supine  Level of Assistance 1: Contact guard  Transfers  Transfer: Yes  Transfer 1  Transfer From 1: Sit to, Stand to  Transfer Device 1: Walker  Transfer Level of Assistance 1: Minimum assistance (X1.)  Ambulation/Gait Training  Ambulation/Gait Training Performed: Yes  Ambulation/Gait Training 1  Surface 1: Level tile  Device 1: Rolling walker  Gait Support Devices: Gait belt  Assistance 1: Minimum assistance (X 1.)  Quality of Gait 1:  (slow, short incoordinated steps, R and L.)  Comments/Distance (ft) 1: x 5 ft. (fwd./bwd.).  c/o R foot slipping.  Stairs  Stairs: No       Extremity/Trunk Assessments:        AROM RLE (degrees)  R Knee Flexion 0-130: 70  R Knee Extension 0-130: 10  LLE   LLE : Within Functional Limits    Outcome Measures:  [facility] Basic Mobility  Turning from your back to your side while in a flat bed without using bedrails: A little  Moving from lying on your back to sitting on the side of a flat bed without using bedrails: A little  Moving to and from bed to chair (including a wheelchair): A little  Standing up from a chair using your arms (e.g. wheelchair or bedside chair): A little  To walk in hospital room: A lot  Climbing 3-5 steps with railing: A lot  Basic Mobility - Total Score: 16                            Goals:  Encounter Problems       Encounter Problems (Active)       Mobility       STG - Patient will  ambulate x 250 ft. with w/w, Modif. Indep..   (Progressing)       Start:  04/01/24    Expected End:  04/15/24            STG - Patient will ambulate up and down a curb/step with CGA.   (Progressing)       Start:  04/01/24    Expected End:  04/15/24            Goal 1:  Achieve 0-90 degrees R knee.   (Progressing)       Start:  04/01/24    Expected End:  04/15/24            Goal 2: Demo 3+/5=R Quads. and a G=SLR.   (Progressing)       Start:  04/01/24    Expected End:  04/15/24               PT Transfers       STG - Patient will perform bed mobility Modif. Indep. skill.  (Progressing)       Start:  04/01/24    Expected End:  04/15/24            STG - Patient will transfer sit to and from stand into a w/w, with Modif. Indep. skill.   (Progressing)       Start:  04/01/24    Expected End:  04/15/24                 Education Documentation  Handouts, taught by Kun Beverly, PT at 4/1/2024  9:54 PM.  Learner: Patient  Readiness: Eager  Method: Demonstration, Explanation  Response: Demonstrated Understanding, Needs Reinforcement    Precautions, taught by Kun Beverly, PT at 4/1/2024  9:54 PM.  Learner: Patient  Readiness: Eager  Method: Demonstration, Explanation  Response: Demonstrated Understanding, Needs Reinforcement    Body Mechanics, taught by Kun Beverly, PT at 4/1/2024  9:54 PM.  Learner: Patient  Readiness: Eager  Method: Demonstration, Explanation  Response: Demonstrated Understanding, Needs Reinforcement    Home Exercise Program, taught by Kun Beverly, PT at 4/1/2024  9:54 PM.  Learner: Patient  Readiness: Eager  Method: Demonstration, Explanation  Response: Demonstrated Understanding, Needs Reinforcement    Mobility Training, taught by Kun Beverly PT at 4/1/2024  9:54 PM.  Learner: Patient  Readiness: Eager  Method: Demonstration, Explanation  Response: Demonstrated Understanding, Needs Reinforcement    Education Comments  No comments found.

## 2024-04-02 NOTE — CARE PLAN
Problem: Pain  Goal: My pain/discomfort is manageable  Outcome: Progressing     Problem: Psychosocial Needs  Goal: Demonstrates ability to cope with hospitalization/illness  Outcome: Progressing   The patient's goals for the shift include pain control    The clinical goals for the shift include remain HDS       audio/written material

## 2024-04-02 NOTE — DISCHARGE SUMMARY
Discharge summary      This patient Dalton Champion was admitted to the hospital on 4/1/2024  after undergoing Procedure(s) (LRB):  Total Knee Arthroplasty (Right) without complications.    No significant or unexpected findings or abnormal ortho imaging were noted during the hospital stay    Hospital course      Patient tolerated surgical procedure well and there was no complications. Patient progressed adequately through their recovery during hospital stay including PT and rehabilitation.    Patient was then D/C on 4/2/2024 to home  in stable condition.  Patient was instructed on the use of pain medications, the signs and symptoms of infection, and was given our number to call should they have any questions or concerns following discharge.    Based on my clinical judgment, the patient was provided with a 7-day prescription for opioid medication at 30 MED, indicated for treatment of acute pain in the setting of recent right TKA. OARRS report was run and has demonstrated an appropriate time course.  The patient has been provided with counseling pertaining to safe use of opioid medication.    Pertinent Physical Exam At Time of Discharge  Alert, oriented x 3, cooperative with examination.     Examination of the right lower  extremity reveals right knee  dressing is intact without drainage.  No yoly-incisional ecchymosis.  EHL, plantarflexion, dorsiflexion are 4+/5.   Sensory intact light touch in the deep/superficial/common peroneal, saphenous, tibial, sural nerve distributions.  DP and popliteal pulses are 2+ with capillary refill less than 2 seconds.  Negative Homans' sign.      Home Medications     Medication List      START taking these medications     aspirin 81 mg EC tablet; Take 1 tablet (81 mg) by mouth 2 times a day.   cyclobenzaprine 10 mg tablet; Commonly known as: Flexeril; Take 1 tablet   (10 mg) by mouth 3 times a day as needed for muscle spasms for up to 5   days.   docusate sodium 100 mg capsule;  "Commonly known as: Colace; Take 1   capsule (100 mg) by mouth 2 times a day for 10 days.   oxyCODONE 5 mg immediate release tablet; Commonly known as: Roxicodone;   Take 1 tablet (5 mg) by mouth every 6 hours if needed for moderate pain (4   - 6) or severe pain (7 - 10) for up to 7 days.     CONTINUE taking these medications     amitriptyline 25 mg tablet; Commonly known as: Elavil; Take 1 tablet (25   mg) by mouth once daily at bedtime.   BD Ultra-Fine Vandana Pen Needle 32 gauge x 5/32\" needle; Generic drug: pen   needle, diabetic; USE 2 NEEDLES DAILY   candesartan 16 mg tablet; Commonly known as: Atacand; TAKE 1 TABLET   DAILY   cimetidine 800 mg tablet; Commonly known as: Tagamet; Take 1 tablet (800   mg) by mouth once daily at bedtime.   Dexcom G7  misc; Generic drug: blood-glucose meter,continuous;   Use as instructed   Dexcom G7 Sensor device; Generic drug: blood-glucose sensor; CHANGE   EVERY 10 DAYS   FreeStyle Lite Strips strip; Generic drug: blood sugar diagnostic; Use   up to 4 times daily as needed for CGM failure   HumaLOG Mix 75-25 KwikPen 100 unit/mL (75-25) injection; Generic drug:   insulin lispro protamin-lispro; INJECT 30 UNITS IN THE MORNING AND 30   UNITS IN THE EVENING   Jardiance 10 mg; Generic drug: empagliflozin; TAKE 1 TABLET DAILY   metFORMIN 1,000 mg tablet; Commonly known as: Glucophage; TAKE 1 TABLET   EVERY 12 HOURS   pioglitazone 30 mg tablet; Commonly known as: Actos; Take 1 tablet (30   mg) by mouth once daily.   simvastatin 10 mg tablet; Commonly known as: Zocor; Take 1 tablet (10   mg) by mouth once daily.   solifenacin 5 mg tablet; Commonly known as: VESIcare; Take 1 tablet (5   mg) by mouth once daily.     STOP taking these medications     chlorhexidine 0.12 % solution; Commonly known as: Peridex           Patient may bear weight as tolerated to operative extremity with use of walker for assistance with ambulation   Mepilex dressing to be removed pod7 and incision left " open to air  Aspirin 81mg BID for DVT prophylaxis started on 4/2 and to be taken for 30  days  Follow up with surgeon in 2 weeks        Outpatient Follow-Up  Future Appointments   Date Time Provider Department Center   4/3/2024 To Be Determined George Avila, PT [facility]   4/4/2024 To Be Determined Charisse Parnell, OT [facility]   4/25/2024 10:00 AM Steffen Frost MD [id number] [facility]   4/26/2024 11:20 AM Dani Kothari MD [id number] [facility]   4/29/2024 10:30 AM Emily Olvera MD [id number] [facility]   5/2/2024 10:30 AM Addy John MD [id number] [facility]   10/2/2024 10:30 AM Addy John MD [id number] [facility]             Rubina Peña PA-C

## 2024-04-02 NOTE — PROGRESS NOTES
Physical Therapy    Physical Therapy    Physical Therapy Treatment    Patient Name: Dalton Champion  MRN: [id number]  Today's Date: 4/2/2024  Time Calculation  Start Time: 1112  Stop Time: 1136  Time Calculation (min): 24 min        04/02/24 1112   PT  Visit   PT Received On 04/02/24   General   Reason for Referral R TKR   Patient Position Received Bed, 3 rail up   Preferred Learning Style verbal;visual   General Comment Pt agreeable to therapy and cleared for participation   Precautions   LE Weight Bearing Status Weight Bearing as Tolerated   Medical Precautions Fall precautions   Post-Surgical Precautions Right total knee precautions   Pain Assessment   Pain Assessment 0-10   Pain Score 3   Pain Type Surgical pain   Pain Location Knee   Pain Interventions Cold applied   Cognition   Overall Cognitive Status WFL   Orientation Level Oriented X4   Therapeutic Exercise   Therapeutic Exercise Performed Yes   Therapeutic Exercise Activity 1 AP,QS,GS,HIP ABD, SQUEEZES X 20 B WITH EMPHASIS ON OPERATED LE   Therapeutic Exercise Activity 2 ROM-80 degrees flexion   Ambulation/Gait Training   Ambulation/Gait Training Performed Yes   Ambulation/Gait Training 1   Surface 1 Level tile   Device 1 Rolling walker   Gait Support Devices Gait belt   Assistance 1 Contact guard   Comments/Distance (ft) 1 250'- step to gait, 2 standing rest breaks to complete distance. min cues to stay close to WW for increased satbility and safety with good follow through.   Transfers   Transfer Yes   Transfer 1   Transfer From 1 Chair with arms to   Transfer to 1 Stand   Technique 1 Sit to stand;Stand to sit   Transfer Level of Assistance 1 Close supervision   Trials/Comments 1 min cues for safe handplacement and sequencing with good follow through.   Activity Tolerance   Endurance Endurance does not limit participation in activity   PT Assessment   End of Session Communication Bedside nurse   Assessment Comment Pt tolerated treatment well, good safety  awareness.   End of Session Patient Position Up in chair       Outcome Measures:  [facility] Basic Mobility  Turning from your back to your side while in a flat bed without using bedrails: A little  Moving from lying on your back to sitting on the side of a flat bed without using bedrails: A little  Moving to and from bed to chair (including a wheelchair): A little  Standing up from a chair using your arms (e.g. wheelchair or bedside chair): A little  To walk in hospital room: A little  Climbing 3-5 steps with railing: A little  Basic Mobility - Total Score: 18                             EDUCATION:     Education Documentation  Handouts, taught by Roxi Lima PTA at 4/2/2024 10:39 AM.  Learner: Patient  Readiness: Acceptance  Method: Explanation, Demonstration  Response: Verbalizes Understanding, Demonstrated Understanding    Precautions, taught by Roxi Lima PTA at 4/2/2024 10:39 AM.  Learner: Patient  Readiness: Acceptance  Method: Explanation, Demonstration  Response: Verbalizes Understanding, Demonstrated Understanding    Body Mechanics, taught by Roxi Lima PTA at 4/2/2024 10:39 AM.  Learner: Patient  Readiness: Acceptance  Method: Explanation, Demonstration  Response: Verbalizes Understanding, Demonstrated Understanding    Home Exercise Program, taught by Roxi Lima PTA at 4/2/2024 10:39 AM.  Learner: Patient  Readiness: Acceptance  Method: Explanation, Demonstration  Response: Verbalizes Understanding, Demonstrated Understanding    Mobility Training, taught by Roxi Lima PTA at 4/2/2024 10:39 AM.  Learner: Patient  Readiness: Acceptance  Method: Explanation, Demonstration  Response: Verbalizes Understanding, Demonstrated Understanding    Education Comments  No comments found.        GOALS:  Encounter Problems       Encounter Problems (Active)       Mobility       STG - Patient will ambulate x 250 ft. with w/w, Modif. Indep..   (Progressing)       Start:  04/01/24    Expected End:  04/15/24             STG - Patient will ambulate up and down a curb/step with CGA.   (Progressing)       Start:  04/01/24    Expected End:  04/15/24            Goal 1:  Achieve 0-90 degrees R knee.   (Progressing)       Start:  04/01/24    Expected End:  04/15/24            Goal 2: Demo 3+/5=R Quads. and a G=SLR.   (Progressing)       Start:  04/01/24    Expected End:  04/15/24               PT Transfers       STG - Patient will perform bed mobility Modif. Indep. skill.  (Progressing)       Start:  04/01/24    Expected End:  04/15/24            STG - Patient will transfer sit to and from stand into a w/w, with Modif. Indep. skill.   (Progressing)       Start:  04/01/24    Expected End:  04/15/24               Pain - Adult

## 2024-04-03 ENCOUNTER — HOME CARE VISIT (OUTPATIENT)
Dept: HOME HEALTH SERVICES | Facility: HOME HEALTH | Age: 69
End: 2024-04-03
Payer: MEDICARE

## 2024-04-03 VITALS
RESPIRATION RATE: 18 BRPM | HEART RATE: 82 BPM | TEMPERATURE: 97.4 F | SYSTOLIC BLOOD PRESSURE: 124 MMHG | DIASTOLIC BLOOD PRESSURE: 72 MMHG

## 2024-04-03 LAB
ATRIAL RATE: 72 BPM
P AXIS: 50 DEGREES
P OFFSET: 197 MS
P ONSET: 142 MS
PR INTERVAL: 160 MS
Q ONSET: 222 MS
QRS COUNT: 11 BEATS
QRS DURATION: 92 MS
QT INTERVAL: 408 MS
QTC CALCULATION(BAZETT): 446 MS
QTC FREDERICIA: 433 MS
R AXIS: 58 DEGREES
T AXIS: 57 DEGREES
T OFFSET: 426 MS
VENTRICULAR RATE: 72 BPM

## 2024-04-03 PROCEDURE — 1090000002 HH PPS REVENUE DEBIT

## 2024-04-03 PROCEDURE — 1090000001 HH PPS REVENUE CREDIT

## 2024-04-03 PROCEDURE — 0023 HH SOC

## 2024-04-03 PROCEDURE — G0152 HHCP-SERV OF OT,EA 15 MIN: HCPCS | Mod: HHH

## 2024-04-03 PROCEDURE — 169592 NO-PAY CLAIM PROCEDURE

## 2024-04-03 PROCEDURE — G0151 HHCP-SERV OF PT,EA 15 MIN: HCPCS | Mod: HHH

## 2024-04-03 SDOH — HEALTH STABILITY: PHYSICAL HEALTH: EXERCISE TYPE: BLE PER HEP

## 2024-04-03 SDOH — ECONOMIC STABILITY: HOUSING INSECURITY: HOME SAFETY: WITH SPOUSE, RANCH HOME, BATHROOM HAS WALK IN SHOWER

## 2024-04-03 ASSESSMENT — ENCOUNTER SYMPTOMS
LOWEST PAIN SEVERITY IN PAST 24 HOURS: 2/10
PAIN LOCATION: RIGHT KNEE
MUSCLE WEAKNESS: 1
HIGHEST PAIN SEVERITY IN PAST 24 HOURS: 7/10
PAIN SEVERITY GOAL: 0/10
PAIN LOCATION: RIGHT KNEE
PERSON REPORTING PAIN: PATIENT
PAIN LOCATION - PAIN SEVERITY: 5/10
LOWEST PAIN SEVERITY IN PAST 24 HOURS: 4/10
LIMITED RANGE OF MOTION: 1
PAIN: 1
PERSON REPORTING PAIN: PATIENT
PAIN: 1

## 2024-04-03 ASSESSMENT — ACTIVITIES OF DAILY LIVING (ADL)
ENTERING_EXITING_HOME: STAND BY ASSIST
DRESSING_LB_CURRENT_FUNCTION: MINIMUM ASSIST
OASIS_M1830: 03
AMBULATION_DISTANCE/DURATION_TOLERATED: 50'
PHYSICAL TRANSFERS ASSESSED: 1
CURRENT_FUNCTION: STAND BY ASSIST
AMBULATION ASSISTANCE: 1
TOILETING: INDEPENDENT
AMBULATION ASSISTANCE: STAND BY ASSIST
TOILETING: 1
AMBULATION ASSISTANCE ON FLAT SURFACES: 1

## 2024-04-04 ENCOUNTER — HOME CARE VISIT (OUTPATIENT)
Dept: HOME HEALTH SERVICES | Facility: HOME HEALTH | Age: 69
End: 2024-04-04
Payer: MEDICARE

## 2024-04-04 DIAGNOSIS — Z96.651 S/P TOTAL KNEE ARTHROPLASTY, RIGHT: ICD-10-CM

## 2024-04-04 PROCEDURE — 1090000001 HH PPS REVENUE CREDIT

## 2024-04-04 PROCEDURE — 1090000002 HH PPS REVENUE DEBIT

## 2024-04-04 RX ORDER — CYCLOBENZAPRINE HCL 10 MG
10 TABLET ORAL 3 TIMES DAILY PRN
Qty: 15 TABLET | Refills: 0 | Status: SHIPPED | OUTPATIENT
Start: 2024-04-04 | End: 2024-04-09 | Stop reason: SDUPTHER

## 2024-04-04 RX ORDER — OXYCODONE HYDROCHLORIDE 5 MG/1
5 TABLET ORAL EVERY 6 HOURS PRN
Qty: 28 TABLET | Refills: 0 | Status: SHIPPED | OUTPATIENT
Start: 2024-04-04 | End: 2024-04-09 | Stop reason: SDUPTHER

## 2024-04-04 NOTE — TELEPHONE ENCOUNTER
Patient lvm asking for refill of oxycodone and flexeril be sent to [facility], had TKR on 4/1/24.    Sent script to [facility].

## 2024-04-05 ENCOUNTER — HOME CARE VISIT (OUTPATIENT)
Dept: HOME HEALTH SERVICES | Facility: HOME HEALTH | Age: 69
End: 2024-04-05
Payer: MEDICARE

## 2024-04-05 VITALS
TEMPERATURE: 97.7 F | HEART RATE: 76 BPM | OXYGEN SATURATION: 97 % | DIASTOLIC BLOOD PRESSURE: 76 MMHG | RESPIRATION RATE: 18 BRPM | SYSTOLIC BLOOD PRESSURE: 126 MMHG

## 2024-04-05 PROCEDURE — 1090000001 HH PPS REVENUE CREDIT

## 2024-04-05 PROCEDURE — G0157 HHC PT ASSISTANT EA 15: HCPCS | Mod: HHH

## 2024-04-05 PROCEDURE — 1090000002 HH PPS REVENUE DEBIT

## 2024-04-05 ASSESSMENT — ENCOUNTER SYMPTOMS
PAIN LOCATION - PAIN SEVERITY: 3/10
SUBJECTIVE PAIN PROGRESSION: GRADUALLY IMPROVING
LOWEST PAIN SEVERITY IN PAST 24 HOURS: 2/10
PERSON REPORTING PAIN: PATIENT
PAIN LOCATION - PAIN FREQUENCY: INTERMITTENT
PAIN LOCATION - RELIEVING FACTORS: ICE, MEDS, STRETCHING
PAIN LOCATION: RIGHT KNEE
PAIN SEVERITY GOAL: 0/10
HIGHEST PAIN SEVERITY IN PAST 24 HOURS: 5/10
PAIN: 1
PAIN LOCATION - PAIN QUALITY: THROBBING

## 2024-04-06 PROCEDURE — 1090000001 HH PPS REVENUE CREDIT

## 2024-04-06 PROCEDURE — 1090000002 HH PPS REVENUE DEBIT

## 2024-04-07 PROCEDURE — 1090000001 HH PPS REVENUE CREDIT

## 2024-04-07 PROCEDURE — 1090000002 HH PPS REVENUE DEBIT

## 2024-04-08 ENCOUNTER — TELEPHONE (OUTPATIENT)
Dept: ORTHOPEDIC SURGERY | Facility: CLINIC | Age: 69
End: 2024-04-08
Payer: MEDICARE

## 2024-04-08 PROCEDURE — 1090000001 HH PPS REVENUE CREDIT

## 2024-04-08 PROCEDURE — 1090000002 HH PPS REVENUE DEBIT

## 2024-04-08 NOTE — TELEPHONE ENCOUNTER
Patient has surgery 4/1/24 and is requesting a refill on his pain meds . Dr. Leon left for the day so noone is here to put meds in , please refill and call and let patient know.

## 2024-04-09 ENCOUNTER — HOME CARE VISIT (OUTPATIENT)
Dept: HOME HEALTH SERVICES | Facility: HOME HEALTH | Age: 69
End: 2024-04-09
Payer: MEDICARE

## 2024-04-09 ENCOUNTER — TELEPHONE (OUTPATIENT)
Dept: ORTHOPEDIC SURGERY | Facility: CLINIC | Age: 69
End: 2024-04-09
Payer: MEDICARE

## 2024-04-09 VITALS
SYSTOLIC BLOOD PRESSURE: 126 MMHG | DIASTOLIC BLOOD PRESSURE: 78 MMHG | TEMPERATURE: 97.8 F | HEART RATE: 78 BPM | RESPIRATION RATE: 18 BRPM | OXYGEN SATURATION: 98 %

## 2024-04-09 DIAGNOSIS — Z96.651 S/P TOTAL KNEE ARTHROPLASTY, RIGHT: ICD-10-CM

## 2024-04-09 PROCEDURE — G0157 HHC PT ASSISTANT EA 15: HCPCS | Mod: HHH

## 2024-04-09 PROCEDURE — 1090000002 HH PPS REVENUE DEBIT

## 2024-04-09 PROCEDURE — 1090000001 HH PPS REVENUE CREDIT

## 2024-04-09 RX ORDER — CYCLOBENZAPRINE HCL 10 MG
10 TABLET ORAL 3 TIMES DAILY PRN
Qty: 15 TABLET | Refills: 0 | Status: SHIPPED | OUTPATIENT
Start: 2024-04-09 | End: 2024-04-09 | Stop reason: SDUPTHER

## 2024-04-09 RX ORDER — OXYCODONE HYDROCHLORIDE 5 MG/1
5 TABLET ORAL EVERY 6 HOURS PRN
Qty: 28 TABLET | Refills: 0 | Status: SHIPPED | OUTPATIENT
Start: 2024-04-09 | End: 2024-04-16

## 2024-04-09 RX ORDER — CYCLOBENZAPRINE HCL 10 MG
10 TABLET ORAL 3 TIMES DAILY PRN
Qty: 15 TABLET | Refills: 0 | Status: SHIPPED | OUTPATIENT
Start: 2024-04-09 | End: 2024-04-29 | Stop reason: ALTCHOICE

## 2024-04-09 RX ORDER — OXYCODONE HYDROCHLORIDE 5 MG/1
5 TABLET ORAL EVERY 6 HOURS PRN
Qty: 28 TABLET | Refills: 0 | Status: SHIPPED | OUTPATIENT
Start: 2024-04-09 | End: 2024-04-09 | Stop reason: SDUPTHER

## 2024-04-09 ASSESSMENT — ENCOUNTER SYMPTOMS
PAIN SEVERITY GOAL: 0/10
HIGHEST PAIN SEVERITY IN PAST 24 HOURS: 3/10
SUBJECTIVE PAIN PROGRESSION: GRADUALLY IMPROVING
PAIN LOCATION - EXACERBATING FACTORS: EXTENDED ACTIVITY
PAIN LOCATION - PAIN QUALITY: THROBBING
PAIN LOCATION: RIGHT KNEE
PAIN LOCATION - PAIN FREQUENCY: INTERMITTENT
PAIN LOCATION - PAIN SEVERITY: 2/10
PERSON REPORTING PAIN: PATIENT
LOWEST PAIN SEVERITY IN PAST 24 HOURS: 1/10
PAIN LOCATION - RELIEVING FACTORS: ICE, MEDS
PAIN: 1

## 2024-04-10 PROCEDURE — 1090000001 HH PPS REVENUE CREDIT

## 2024-04-10 PROCEDURE — 1090000002 HH PPS REVENUE DEBIT

## 2024-04-11 ENCOUNTER — HOME CARE VISIT (OUTPATIENT)
Dept: HOME HEALTH SERVICES | Facility: HOME HEALTH | Age: 69
End: 2024-04-11
Payer: MEDICARE

## 2024-04-11 VITALS
HEART RATE: 80 BPM | TEMPERATURE: 97.8 F | DIASTOLIC BLOOD PRESSURE: 82 MMHG | RESPIRATION RATE: 18 BRPM | OXYGEN SATURATION: 97 % | SYSTOLIC BLOOD PRESSURE: 124 MMHG

## 2024-04-11 PROCEDURE — 1090000002 HH PPS REVENUE DEBIT

## 2024-04-11 PROCEDURE — G0157 HHC PT ASSISTANT EA 15: HCPCS | Mod: CQ,HHH

## 2024-04-11 PROCEDURE — 1090000001 HH PPS REVENUE CREDIT

## 2024-04-11 ASSESSMENT — ENCOUNTER SYMPTOMS
PAIN LOCATION - EXACERBATING FACTORS: AT NIGHT
PAIN SEVERITY GOAL: 0/10
LOWEST PAIN SEVERITY IN PAST 24 HOURS: 2/10
PERSON REPORTING PAIN: PATIENT
PAIN LOCATION - PAIN SEVERITY: 3/10
HIGHEST PAIN SEVERITY IN PAST 24 HOURS: 5/10
PAIN: 1
SUBJECTIVE PAIN PROGRESSION: GRADUALLY IMPROVING
PAIN LOCATION - PAIN QUALITY: THROBBING
PAIN LOCATION: RIGHT KNEE
PAIN LOCATION - PAIN FREQUENCY: INTERMITTENT

## 2024-04-12 PROCEDURE — 1090000001 HH PPS REVENUE CREDIT

## 2024-04-12 PROCEDURE — 1090000002 HH PPS REVENUE DEBIT

## 2024-04-13 PROCEDURE — 1090000002 HH PPS REVENUE DEBIT

## 2024-04-13 PROCEDURE — 1090000001 HH PPS REVENUE CREDIT

## 2024-04-14 PROCEDURE — 1090000002 HH PPS REVENUE DEBIT

## 2024-04-14 PROCEDURE — 1090000001 HH PPS REVENUE CREDIT

## 2024-04-15 PROCEDURE — 1090000002 HH PPS REVENUE DEBIT

## 2024-04-15 PROCEDURE — G0180 MD CERTIFICATION HHA PATIENT: HCPCS | Performed by: ORTHOPAEDIC SURGERY

## 2024-04-15 PROCEDURE — 1090000001 HH PPS REVENUE CREDIT

## 2024-04-16 ENCOUNTER — HOME CARE VISIT (OUTPATIENT)
Dept: HOME HEALTH SERVICES | Facility: HOME HEALTH | Age: 69
End: 2024-04-16
Payer: MEDICARE

## 2024-04-16 VITALS
RESPIRATION RATE: 18 BRPM | HEART RATE: 78 BPM | DIASTOLIC BLOOD PRESSURE: 78 MMHG | OXYGEN SATURATION: 98 % | TEMPERATURE: 97.8 F | SYSTOLIC BLOOD PRESSURE: 122 MMHG

## 2024-04-16 PROCEDURE — 1090000002 HH PPS REVENUE DEBIT

## 2024-04-16 PROCEDURE — G0157 HHC PT ASSISTANT EA 15: HCPCS | Mod: CQ,HHH

## 2024-04-16 PROCEDURE — 1090000001 HH PPS REVENUE CREDIT

## 2024-04-16 ASSESSMENT — ENCOUNTER SYMPTOMS
PAIN LOCATION - PAIN SEVERITY: 2/10
PAIN LOCATION: RIGHT KNEE
LOWEST PAIN SEVERITY IN PAST 24 HOURS: 1/10
PAIN LOCATION - PAIN FREQUENCY: INTERMITTENT
HIGHEST PAIN SEVERITY IN PAST 24 HOURS: 4/10
PAIN LOCATION - EXACERBATING FACTORS: EXTENDED ACTIVITY
SUBJECTIVE PAIN PROGRESSION: GRADUALLY IMPROVING
PAIN LOCATION - RELIEVING FACTORS: ICE, MEDS
PAIN LOCATION - PAIN QUALITY: THROBBING
PAIN SEVERITY GOAL: 0/10
PAIN: 1
PERSON REPORTING PAIN: PATIENT

## 2024-04-17 PROCEDURE — 1090000001 HH PPS REVENUE CREDIT

## 2024-04-17 PROCEDURE — 1090000002 HH PPS REVENUE DEBIT

## 2024-04-18 ENCOUNTER — HOME CARE VISIT (OUTPATIENT)
Dept: HOME HEALTH SERVICES | Facility: HOME HEALTH | Age: 69
End: 2024-04-18
Payer: MEDICARE

## 2024-04-18 ENCOUNTER — TELEPHONE (OUTPATIENT)
Dept: PAIN MEDICINE | Facility: CLINIC | Age: 69
End: 2024-04-18
Payer: MEDICARE

## 2024-04-18 VITALS
TEMPERATURE: 97.8 F | RESPIRATION RATE: 18 BRPM | SYSTOLIC BLOOD PRESSURE: 118 MMHG | HEART RATE: 78 BPM | OXYGEN SATURATION: 98 % | DIASTOLIC BLOOD PRESSURE: 78 MMHG

## 2024-04-18 PROCEDURE — G0157 HHC PT ASSISTANT EA 15: HCPCS | Mod: CQ,HHH

## 2024-04-18 PROCEDURE — 1090000002 HH PPS REVENUE DEBIT

## 2024-04-18 PROCEDURE — 1090000001 HH PPS REVENUE CREDIT

## 2024-04-18 ASSESSMENT — ENCOUNTER SYMPTOMS
PAIN LOCATION - PAIN FREQUENCY: INTERMITTENT
LOWEST PAIN SEVERITY IN PAST 24 HOURS: 1/10
PAIN: 1
HIGHEST PAIN SEVERITY IN PAST 24 HOURS: 5/10
PAIN SEVERITY GOAL: 0/10
SUBJECTIVE PAIN PROGRESSION: GRADUALLY IMPROVING
PERSON REPORTING PAIN: PATIENT
PAIN LOCATION: RIGHT KNEE
PAIN LOCATION - RELIEVING FACTORS: ICE, EXERCISE
PAIN LOCATION - PAIN SEVERITY: 3/10

## 2024-04-18 NOTE — TELEPHONE ENCOUNTER
Dalton is having neck pain and is requesting an injection, let me know how you would like to proceed, thanks

## 2024-04-19 PROCEDURE — 1090000001 HH PPS REVENUE CREDIT

## 2024-04-19 PROCEDURE — 1090000002 HH PPS REVENUE DEBIT

## 2024-04-20 PROCEDURE — 1090000002 HH PPS REVENUE DEBIT

## 2024-04-20 PROCEDURE — 1090000001 HH PPS REVENUE CREDIT

## 2024-04-21 PROCEDURE — 1090000001 HH PPS REVENUE CREDIT

## 2024-04-21 PROCEDURE — 1090000002 HH PPS REVENUE DEBIT

## 2024-04-22 DIAGNOSIS — M54.12 RADICULOPATHY, CERVICAL: Primary | ICD-10-CM

## 2024-04-22 PROCEDURE — 1090000001 HH PPS REVENUE CREDIT

## 2024-04-22 PROCEDURE — 1090000002 HH PPS REVENUE DEBIT

## 2024-04-23 ENCOUNTER — HOME CARE VISIT (OUTPATIENT)
Dept: HOME HEALTH SERVICES | Facility: HOME HEALTH | Age: 69
End: 2024-04-23
Payer: MEDICARE

## 2024-04-23 PROCEDURE — 1090000002 HH PPS REVENUE DEBIT

## 2024-04-23 PROCEDURE — 1090000001 HH PPS REVENUE CREDIT

## 2024-04-23 PROCEDURE — G0151 HHCP-SERV OF PT,EA 15 MIN: HCPCS | Mod: HHH

## 2024-04-23 SDOH — HEALTH STABILITY: PHYSICAL HEALTH: EXERCISE TYPE: BLE PER HEP

## 2024-04-23 ASSESSMENT — ENCOUNTER SYMPTOMS
PERSON REPORTING PAIN: PATIENT
PAIN SEVERITY GOAL: 0/10
PAIN LOCATION: RIGHT KNEE
PAIN: 1
HIGHEST PAIN SEVERITY IN PAST 24 HOURS: 3/10
LOWEST PAIN SEVERITY IN PAST 24 HOURS: 0/10

## 2024-04-23 ASSESSMENT — ACTIVITIES OF DAILY LIVING (ADL)
AMBULATION ASSISTANCE ON FLAT SURFACES: 1
AMBULATION ASSISTANCE: INDEPENDENT
PHYSICAL TRANSFERS ASSESSED: 1
CURRENT_FUNCTION: INDEPENDENT
OASIS_M1830: 01
AMBULATION ASSISTANCE: 1
HOME_HEALTH_OASIS: 00
AMBULATION_DISTANCE/DURATION_TOLERATED: 200'

## 2024-04-25 ENCOUNTER — OFFICE VISIT (OUTPATIENT)
Dept: ORTHOPEDIC SURGERY | Facility: CLINIC | Age: 69
End: 2024-04-25
Payer: MEDICARE

## 2024-04-25 ENCOUNTER — HOSPITAL ENCOUNTER (OUTPATIENT)
Dept: RADIOLOGY | Facility: CLINIC | Age: 69
Discharge: HOME | End: 2024-04-25
Payer: MEDICARE

## 2024-04-25 DIAGNOSIS — Z96.651 STATUS POST TOTAL RIGHT KNEE REPLACEMENT: Primary | ICD-10-CM

## 2024-04-25 DIAGNOSIS — Z96.651 STATUS POST TOTAL RIGHT KNEE REPLACEMENT: ICD-10-CM

## 2024-04-25 PROCEDURE — 1157F ADVNC CARE PLAN IN RCRD: CPT | Performed by: ORTHOPAEDIC SURGERY

## 2024-04-25 PROCEDURE — 73562 X-RAY EXAM OF KNEE 3: CPT | Mod: RT

## 2024-04-25 PROCEDURE — 3008F BODY MASS INDEX DOCD: CPT | Performed by: ORTHOPAEDIC SURGERY

## 2024-04-25 PROCEDURE — 3061F NEG MICROALBUMINURIA REV: CPT | Performed by: ORTHOPAEDIC SURGERY

## 2024-04-25 PROCEDURE — 99213 OFFICE O/P EST LOW 20 MIN: CPT | Performed by: ORTHOPAEDIC SURGERY

## 2024-04-25 PROCEDURE — 3048F LDL-C <100 MG/DL: CPT | Performed by: ORTHOPAEDIC SURGERY

## 2024-04-25 PROCEDURE — 20610 DRAIN/INJ JOINT/BURSA W/O US: CPT | Performed by: ORTHOPAEDIC SURGERY

## 2024-04-25 PROCEDURE — 1160F RVW MEDS BY RX/DR IN RCRD: CPT | Performed by: ORTHOPAEDIC SURGERY

## 2024-04-25 PROCEDURE — 1036F TOBACCO NON-USER: CPT | Performed by: ORTHOPAEDIC SURGERY

## 2024-04-25 PROCEDURE — 3052F HG A1C>EQUAL 8.0%<EQUAL 9.0%: CPT | Performed by: ORTHOPAEDIC SURGERY

## 2024-04-25 PROCEDURE — 1159F MED LIST DOCD IN RCRD: CPT | Performed by: ORTHOPAEDIC SURGERY

## 2024-04-25 PROCEDURE — 99024 POSTOP FOLLOW-UP VISIT: CPT | Performed by: ORTHOPAEDIC SURGERY

## 2024-04-25 PROCEDURE — 73562 X-RAY EXAM OF KNEE 3: CPT | Mod: RIGHT SIDE | Performed by: RADIOLOGY

## 2024-04-25 PROCEDURE — 4010F ACE/ARB THERAPY RXD/TAKEN: CPT | Performed by: ORTHOPAEDIC SURGERY

## 2024-04-25 RX ORDER — TRIAMCINOLONE ACETONIDE 40 MG/ML
40 INJECTION, SUSPENSION INTRA-ARTICULAR; INTRAMUSCULAR
Status: COMPLETED | OUTPATIENT
Start: 2024-04-25 | End: 2024-04-25

## 2024-04-25 RX ORDER — LIDOCAINE HYDROCHLORIDE 10 MG/ML
2 INJECTION INFILTRATION; PERINEURAL
Status: COMPLETED | OUTPATIENT
Start: 2024-04-25 | End: 2024-04-25

## 2024-04-25 RX ADMIN — TRIAMCINOLONE ACETONIDE 40 MG: 40 INJECTION, SUSPENSION INTRA-ARTICULAR; INTRAMUSCULAR at 11:31

## 2024-04-25 RX ADMIN — LIDOCAINE HYDROCHLORIDE 2 ML: 10 INJECTION INFILTRATION; PERINEURAL at 11:31

## 2024-04-25 NOTE — PROGRESS NOTES
History of Present Illness:  Patient returns today endorsing moderate pain.  Denies any numbness or tingling.  No calf pain, No chest pain or shortness of breath.  He did have a significant ROM deficit prior to TKA on the right. He also has known underlying left knee DJD.   He would like CSI to the left knee today.     Physical Examination:  Mild swelling  Healthy incision, no erythema or drainage  ROM:  0-95  + Plantar/dorsiflexion  Negative Harry test    Left Knee:  Skin healthy and intact  No gross swelling or ecchymosis  Alignment: varus  Effusion: minimal  ROM: Decreased  Crepitance with range of motion  No pain with internal rotation of the hip  Tenderness to palpation: medial and lateral joint line    No laxity to valgus stress  No laxity to varus stress  Negative Lachman´s test  Negative posterior drawer test  Mild pain with Joby´s test    Neurovascular exam normal distally  2+ DP pulse and good cap refill     Imaging:  Appropriate position and alignment no evidence of implant failure   Films of left knee reveal severe DJD    Assessment  Patient status post right total knee arthroplasty, doing well. Known left knee DJD.     Plan:  Discussed analgesics, encouraging non-opioid modalities.  Encouraged ice, rest.  Discussed importance of ROM/ formal physical therapy.  Reviewed dental prophylaxis.  Patient agreeable to left knee CSI today.   Follow-up in 1 month for a motion check.    [facility] Willams PA-C     In a face to face encounter, I evaluated the patient and performed a physical examination, discussed pertinent diagnostic studies if indicated and discussed diagnosis and management strategies with both the patient and physician assistant / nurse practitioner.  I reviewed the PA/NP's note and agree with the documented findings and plan of care.    L Inj/Asp: L knee on 4/25/2024 11:31 AM  Indications: pain  Details: 22 G needle, anteromedial approach  Medications: 2 mL lidocaine 10 mg/mL (1 %); 40 mg  triamcinolone acetonide 40 mg/mL  Outcome: tolerated well, no immediate complications  Procedure, treatment alternatives, risks and benefits explained, specific risks discussed. Consent was given by the patient. Immediately prior to procedure a time out was called to verify the correct patient, procedure, equipment, support staff and site/side marked as required. Patient was prepped and draped in the usual sterile fashion.             Steffen Frost MD

## 2024-04-26 ENCOUNTER — EVALUATION (OUTPATIENT)
Dept: PHYSICAL THERAPY | Facility: CLINIC | Age: 69
End: 2024-04-26
Payer: MEDICARE

## 2024-04-26 ENCOUNTER — OFFICE VISIT (OUTPATIENT)
Dept: NEPHROLOGY | Facility: CLINIC | Age: 69
End: 2024-04-26
Payer: MEDICARE

## 2024-04-26 VITALS
HEART RATE: 105 BPM | SYSTOLIC BLOOD PRESSURE: 132 MMHG | DIASTOLIC BLOOD PRESSURE: 70 MMHG | WEIGHT: 197 LBS | BODY MASS INDEX: 31.66 KG/M2 | HEIGHT: 66 IN

## 2024-04-26 DIAGNOSIS — M17.11 PRIMARY OSTEOARTHRITIS OF RIGHT KNEE: Primary | ICD-10-CM

## 2024-04-26 DIAGNOSIS — Z96.651 STATUS POST TOTAL RIGHT KNEE REPLACEMENT: ICD-10-CM

## 2024-04-26 DIAGNOSIS — I10 ESSENTIAL HYPERTENSION: ICD-10-CM

## 2024-04-26 DIAGNOSIS — E08.22 DIABETES MELLITUS DUE TO UNDERLYING CONDITION WITH DIABETIC CHRONIC KIDNEY DISEASE, UNSPECIFIED CKD STAGE, UNSPECIFIED WHETHER LONG TERM INSULIN USE (MULTI): ICD-10-CM

## 2024-04-26 DIAGNOSIS — N18.31 STAGE 3A CHRONIC KIDNEY DISEASE (MULTI): Primary | ICD-10-CM

## 2024-04-26 DIAGNOSIS — N18.30 STAGE 3 CHRONIC KIDNEY DISEASE, UNSPECIFIED WHETHER STAGE 3A OR 3B CKD (MULTI): ICD-10-CM

## 2024-04-26 PROCEDURE — 3008F BODY MASS INDEX DOCD: CPT | Performed by: INTERNAL MEDICINE

## 2024-04-26 PROCEDURE — 3061F NEG MICROALBUMINURIA REV: CPT | Performed by: INTERNAL MEDICINE

## 2024-04-26 PROCEDURE — 97535 SELF CARE MNGMENT TRAINING: CPT | Mod: GP | Performed by: PHYSICAL THERAPIST

## 2024-04-26 PROCEDURE — 1160F RVW MEDS BY RX/DR IN RCRD: CPT | Performed by: INTERNAL MEDICINE

## 2024-04-26 PROCEDURE — 97110 THERAPEUTIC EXERCISES: CPT | Mod: GP | Performed by: PHYSICAL THERAPIST

## 2024-04-26 PROCEDURE — 1157F ADVNC CARE PLAN IN RCRD: CPT | Performed by: INTERNAL MEDICINE

## 2024-04-26 PROCEDURE — 3048F LDL-C <100 MG/DL: CPT | Performed by: INTERNAL MEDICINE

## 2024-04-26 PROCEDURE — 99203 OFFICE O/P NEW LOW 30 MIN: CPT | Performed by: INTERNAL MEDICINE

## 2024-04-26 PROCEDURE — 3075F SYST BP GE 130 - 139MM HG: CPT | Performed by: INTERNAL MEDICINE

## 2024-04-26 PROCEDURE — 3078F DIAST BP <80 MM HG: CPT | Performed by: INTERNAL MEDICINE

## 2024-04-26 PROCEDURE — 1159F MED LIST DOCD IN RCRD: CPT | Performed by: INTERNAL MEDICINE

## 2024-04-26 PROCEDURE — 97161 PT EVAL LOW COMPLEX 20 MIN: CPT | Mod: GP | Performed by: PHYSICAL THERAPIST

## 2024-04-26 PROCEDURE — 3052F HG A1C>EQUAL 8.0%<EQUAL 9.0%: CPT | Performed by: INTERNAL MEDICINE

## 2024-04-26 PROCEDURE — 4010F ACE/ARB THERAPY RXD/TAKEN: CPT | Performed by: INTERNAL MEDICINE

## 2024-04-26 PROCEDURE — 1036F TOBACCO NON-USER: CPT | Performed by: INTERNAL MEDICINE

## 2024-04-26 ASSESSMENT — PAIN - FUNCTIONAL ASSESSMENT: PAIN_FUNCTIONAL_ASSESSMENT: 0-10

## 2024-04-26 ASSESSMENT — PAIN SCALES - GENERAL: PAINLEVEL_OUTOF10: 1

## 2024-04-26 ASSESSMENT — PAIN DESCRIPTION - DESCRIPTORS: DESCRIPTORS: ACHING;SORE

## 2024-04-26 NOTE — LETTER
April 26, 2024    Ashish Pearl, PT  [address] [place], [address] [place] [address]    Patient: Dalton Champion   YOB: 1955   Date of Visit: 4/26/2024       Dear Steffen Frost MD  [address]   [facility], [address] [place] [address],  [place] [address]    The attached plan of care is being sent to you because your patient’s medical reimbursement requires that you certify the plan of care. Your signature is required to allow uninterrupted insurance coverage.      You may indicate your approval by signing below and faxing this form back to us at Dept Fax: [phone].    Please call Dept: [phone] with any questions or concerns.    Thank you for this referral,        Ashish Pearl, PT  [place] [address] [facility]  [address] [facility] [address]    Payer: Payor: MEDICARE / Plan: MEDICARE PART A AND B / Product Type: *No Product type* /                                                                         Date:     Dear Ashish Pearl, PT,     Re: Mr. Dalton Champion, MRN:[id number]    I certify that I have reviewed the attached plan of care and it is medically necessary for Mr. Dalton Champion (1955) who is under my care.          ______________________________________                    _________________  Provider name and credentials                                           Date and time                                                                                           Plan of Care 4/26/24   Effective from: 4/26/2024  Effective to: 7/25/2024    Plan ID: [id number]            Participants as of Finalize on 4/26/2024    Name Type Comments Contact Info    Steffen Frost MD Referring Provider  [phone]    Ashish Pearl PT Physical Therapist  [phone]       Last Plan Note     Author: Ashish Pearl PT Status: Incomplete Last edited: 4/26/2024  9:15 AM       PT Initial  Evaluation    Patient Name:  Dalton Champion    MRN:  [id number]    :  1955    Today's Date:  24    Time Calculation  Start Time: 915  Stop Time: 955  Time Calculation (min): 40 min  PT Evaluation Time Entry  PT Evaluation (Low) Time Entry: 15  PT Therapeutic Procedures Time Entry  Therapeutic Exercise Time Entry: 15  Self-Care/Home Mgmt Trainin     Informed Consent  Patient has been informed of all evaluation findings and treatment plans and agrees to participate in Physical Therapy services and plans as outlined.    Diagnosis:  Diagnosis and Precautions: R knee OA, R Total Knee Arthroplasty 2024    Goals:   By the end of 10 visits patient will be able to do the following with < 0/10 R KNEE pain:    HEP:  Patient will consistently perform HIS home exercise program for 20-30 minute sessions, 1-2x/day, 3-4 days/week independently by the end of 10 visits.    Basic ADL's:   Patient will perform bADL's/instrumental ADL's for 30 minutes moving between various closed kinetic chain postures.    ROM and Strength:  Patient will demonstrate 5/5 R KNEE strength in all planes and 0-120 degrees R KNEE AROM in all planes to improve their ability to lift, stand, ambulate and perform basic ADL's.    Stair Negotiation:  Patient will be able to ambulate up/down stairs for 1-2 flights at a time.    Gait/Locomotion:  Patient will be able to ambulate for 30-60 minutes at a time.  Minimal to no gait deviation across level ground/stairs.  Patient will demonstrate no R KNEE pain with the following movements:  partial squat, lunge, forward/lateral step-up, HR, stepdown and SLS.    Sleep:  Patient will sleep thru the night 4/7 nights/week.    Participation restrictions:  Increase LEFS to > or = to 55/80 for increased functional ability.    Pain:  Decrease pain at worst to < or = to 0/10 for improved QOL and ability to sleep.    Plan of Care:      Treatment/Interventions: Cryotherapy, Education/ Instruction, Gait  training, Manual therapy, Neuromuscular re-education, Self care/ home management, Taping techniques, Therapeutic activities, Vasopneumatic device, Therapeutic exercises  PT Plan: Skilled PT  PT Frequency: 2 times per week  Duration: 10 visits  Onset Date: 04/26/21  Certification Period Start Date: 04/26/24  Certification Period End Date: 07/25/24  Number of Treatments Authorized: 10  Rehab Potential: Good  Plan of Care Agreement: Patient    PT Assessment:    Patient is a 69 y.o. MALE with c/o R knee pain.   Patient is alert and oriented x 3.  Patient presents with medical diagnosis of R knee OA, R Total Knee Arthroplasty 4/1/2024  contributing to compensatory soft tissue dysfunction, pain, stiffness and weakness of the R knee.   Significant past medical history/past surgical history includes see above.    Skilled care is needed to progress the patient back to these activities without exacerbating symptoms.   Patient requires skilled PT services to address the problems identified and the individualized patient's goals as outlined in the problems and goals section of this evaluation.  A skilled PT is required to address these key impairments and to provide and progress with an appropriate home exercise program. Patient does have any significant PMH influencing Rx and reports motivation to return to FUNCTIONAL ACTIVITY.   Patient demonstrates to be a good candidate for physical therapy with good rehab potential and verbalized a good understanding of HIS diagnosis, prognosis and treatment.  Goals have been established and reviewed with the patient.      PT Assessment Results: Decreased strength, Decreased range of motion, Decreased endurance, Impaired balance, Decreased mobility, Pain, Orthopedic restrictions  Rehab Prognosis: Good  Evaluation/Treatment Tolerance: Patient limited by fatigue, Patient limited by pain    Complexity:  Low complexity evaluation  due to a 15 minute duration, a past medical history WITH any  personal factors and/or comorbidities that could impact the POC, examination of body systems completed on one to two elements, the patient presents with a stable condition.     Prognosis:  Rehab Prognosis: Good    Problem List  Activity Limitations, Decreased Functional Level, Decreased knowledge of HEP, Flexibility, Gait issues, Pain, Range of Motion/joint mobility issues, Strength, and Endurance    Impairments   IMPAIRED ROM LOWER BODY, IMPAIRED STRENGTH LOWER BODY, IMPAIRED GAIT, IMPAIRED BALANCE, IMPAIRED CORE STABILITY, INCREASED PAIN, IMPAIRED FUNCTIONAL ACTIVITY LEVEL, and IMPAIRED FUNCTIONAL MOVEMENT PATTERNS    Functional Limitations:  LIMITATIONS PERFORMING BASIC ADL'S, ISSUES WITH SLEEP, PARTICIPATION IN HOBBIES, PARTICIPATION IN LEISURE ACTIVITIES, and PARTICIPATION IN HOME MANAGEMENT    General Visit Information:  Reason for Referral: PT Evaluate and Treat  Referred By: Dr. Steffen Frost  General Comment: R knee OA, R Total Knee Arthroplasty 4/1/2024    Pre-Cautions:  MANNY Fall Risk Score (The score of 4 or more indicates an increased risk of falling): 0     Medical Precautions:  (diabetes, testicular cancer, OA, 1 kidney))  Post-Surgical Precautions: Right total knee precautions    Protocol:                     Weight Bearing      All primary knees:  weight bearing as tolerated (WBAT) for 4 weeks with assistance.    Progression to a Cane     3 - 4 weeks (able to lift and hold leg off bed/table 3 - 5 seconds)    Exercises     Active range of motion (AROM) flexion and extension:  preferred/encouraged post-op   Passive range of motion (PROM) flexion and extension:  OK post-op if gentle!   Active-assisted range of motion (AAROM) flexion and extenstion:  OK post-op if gentle!   Isometric Quadriceps exercises:  OK post-op   Passive stretch:  OK post-op gently!   Active-assisted stretching (Contract/Relax):  OK post-op gently!   Isotonic Hamstrings (limit 10 lbs.):  3 - 4 weeks   Isotonic Quads (limit 10 - 15  lbs.):  3 - 4 weeks   Stationary Bike:  3 - 4 weeks when able   Hamstring isometrics may start at 4 weeks   Closed chain exercises may start at 4 - 5 weeks    Additional Information     NO! pillows within 6 inches of the popliteal space/crease - NO!   NO! patellar mobilization NO!   May roll to the operative side   Gentle massage to decrease edema, hypersensitivity, and pain is OK   Scar mobilization: OK at 6 - 8 weeks   Neuro-muscular electrical stimulation to quads: after 4 weeks if indicated   CPM:  only used with contractures, post manipulation with epidural    Recreational Activities     Golf:   6 - 8 weeks (putt/chip/short irons)    12 weeks (long irons/woods)      Swimmin - 6 weeks      Bike:   stationary 3 - 4 weeks with high seat    Regular bike outdoors 12 weeks     [place]lin weeks     Driving Car:  1st follow-up visit, ~ 6 weeks    ADDITIONAL INFORMATION:    Please send copies of therapy notes.    Physician's Signature:                    Steffen Frost MD    Reason for Visit:  PT Evaluate and Treat    Initial Evaluation:  Referred By: Dr. Steffen Frost    Insurance  Insurance reviewed  Name of Insurance:  Medicare  Visit No.  1  * (EVAL) RIGHT TKR; 20% COINSUR / $240 DED / UNLIM V BMN / PA IS NOT REQ / KX MOD AFTER  VISIT;  4 LIFE IS SEC TO MEDICARE AND WILL COV PART B COINSUR AFTER DED IS MET [id number] // Belinda confirmed 24 9:17am     Subjective:    Current Episode  Date of Onset:  3 years ago  Mechanism of injury:  Patient reports having arthritis of his R knee and his R knee has been hurting for about 3 years.    Pain Score:  Pain Assessment: 0-10  Pain Assessment  Pain Assessment: 0-10  Pain Score: 1 (0/10 best, 5/10 worst)  Pain Type: Chronic pain  Pain Location: Knee  Pain Orientation: Right  Pain Descriptors: Aching, Sore  Pain Frequency: Intermittent  Pain Type: Chronic pain  Pain Location: Knee  Pain Orientation: Right  Pain Descriptors: Aching, Sore  Pain Frequency:  "Intermittent    Better with:  ice    Worse with:  prolonged sitting/standing/walking, stairs, squatting    Medical History/Surgical History:  Medical Precautions:  (diabetes, testicular cancer, OA, 1 kidney)    Reviewed medical history form with patient (medications/allergies reviewed with patient).  Current Outpatient Medications   Medication Instructions   • amitriptyline (ELAVIL) 25 mg, oral, Nightly   • aspirin 81 mg, oral, 2 times daily   • BD Ultra-Fine Vandana Pen Needle 32 gauge x 5/32\" needle USE 2 NEEDLES DAILY   • blood sugar diagnostic (FreeStyle Lite Strips) strip Use up to 4 times daily as needed for CGM failure   • blood-glucose sensor (Dexcom G7 Sensor) device CHANGE EVERY 10 DAYS   • candesartan (ATACAND) 16 mg, oral, Daily   • cimetidine (TAGAMET) 800 mg, oral, Nightly   • cyclobenzaprine (FLEXERIL) 10 mg, oral, 3 times daily PRN   • Dexcom G4 platinum  (Dexcom G7 ) misc Use as instructed   • HumaLOG Mix 75-25 KwikPen 100 unit/mL (75-25) injection INJECT 30 UNITS IN THE MORNING AND 30 UNITS IN THE EVENING   • Jardiance 10 mg, oral, Daily   • metFORMIN (GLUCOPHAGE) 1,000 mg, oral, Every 12 hours   • pioglitazone (ACTOS) 30 mg, oral, Daily   • simvastatin (ZOCOR) 10 mg, oral, Daily   • solifenacin (VESICARE) 5 mg, oral, Daily     Radiology:  X-rays yesterday, results not available as of yet    Functional Assessment:  Level of Living[place]:  Level of [place]ston: Independent with ADLs and functional transfers    Work Status:  RETIRED  Patient Awareness:  Patient is aware of HIS diagnosis and prognosis.  Social Support/History:  LIVES WITH FAMILY    Objective:  Restrictions as per MD's Protocol if surgical:  see protocol above    Weightbearing Status:  WBAT R LE    Skin:  R knee surgical TKA incision healing and closed, no active signs of infection    Palpation:   no point tenderness over the R knee    Sensation:  Patient denies numbness/tingling of bilateral LOWER extremities.    Gait:  " mild antalgic gait R LE    ROM:  AROM  R knee flexion/extension -5 degrees-86 degrees (98 degrees after heel slides)  L knee AROM WNL's, R hip AROM WNL's, L hip AROM WNL's, R ankle/foot AROM WNL's, and L ankle/foot AROM WNL's    Strength:    R knee 4-/5 all planes, R hip flexion 4-/5  L knee 5/5 all planes, L hip 5/5 all planes, R ankle/foot 5/5 all planes, and L ankle/foot 5/5 all planes    Outcome measure  Lower Extremity Funtional Score (LEFS): 25/80      Treatment  Time in clinic started at  9:15am  Time in clinic ended at  9:55am  Total time in clinic is . 40 minutes  Total timed code time is  38 minutes    Treatment Performed Today:.   PT Initial Evaluation, Therapeutic Exercise, and Self-Care/Home Management HEP  Individual(s) Educated: Patient  Education Provided: Home Exercise Program  Diagnosis and Precautions: R knee OA, R Total Knee Arthroplasty 4/1/2024  Risk and Benefits Discussed with Patient/Caregiver/Other: yes  Patient/Caregiver Demonstrated Understanding: yes  Plan of Care Discussed and Agreed Upon: yes  Patient Response to Education: Patient/Caregiver Verbalized Understanding of Information, Patient/Caregiver Performed Return Demonstration of Exercises/Activities    Patient instructed in a home exercise program, has been given handouts for each of the exercises performed and was given another sheet instructing patient in the amount of reps to perform and the justus to follow while doing the exercises     Access Code: [email or web address]  URL: [email or web address]  Date: 04/26/2024  Prepared by: Ashish Pearl    Exercises  - Supine Bridge  - 1 x daily - 3-4 x weekly - 2 sets - 10 reps - 5-10 hold  - Supine Heel Slide with Strap  - 1 x daily - 3-4 x weekly - 3 sets - 10 reps - 5-10 hold  - Prone Quadriceps Stretch with Strap  - 1 x daily - 3-4 x weekly - 1 sets - 5 reps - 30 hold  - Active Straight Leg Raise with Quad Set  - 1 x daily - 3-4 x weekly - 2 sets - 10 reps - 5-10 hold  -  Long Sitting Knee Extension with Towel Foot Lift  - 1 x daily - 4-5 x weekly - 3 sets - 10 reps - 5-10 hold          Current Participants as of 4/26/2024    Name Type Comments Contact Info    Steffen Frost MD Referring Provider  [phone]    Signature pending    Ashish Pearl PT Physical Therapist  [phone]    Signature pending

## 2024-04-26 NOTE — PROGRESS NOTES
"Dalton Champion   69 y.o.      Vitals:    04/26/24 1116   Weight: 89.4 kg (197 lb)      MRN/Room: [id number]/Room/bed info not found      History Of Present Illness  Dalton Champion is a 69 y.o. male presenting with high Cr level.     Past Medical History  He has a past medical history of Abdominal cramping (04/24/2023), Arthritis, Basal cell carcinoma (BCC), Chronic pain disorder, Congenital absence of right kidney, GERD (gastroesophageal reflux disease), Hyperlipidemia, Knee pain (04/24/2023), Lung nodules, Personal history of other endocrine, nutritional and metabolic disease, and Testicular cancer (Multi).    Surgical History  He has a past surgical history that includes Colonoscopy; Orchiectomy (Right); Dauphin tooth extraction; and Total knee arthroplasty (Right, 04/01/2024).     Social History  He reports that he has quit smoking. His smoking use included cigarettes. He has never used smokeless tobacco. He reports that he does not drink alcohol and does not use drugs.    Family History  Family History   Problem Relation Name Age of Onset    No Known Problems Mother      Diabetes Father      Diabetes Brother          Allergies  Iodinated contrast media and Iodine    Meds:         Current Outpatient Medications   Medication Sig Dispense Refill    amitriptyline (Elavil) 25 mg tablet Take 1 tablet (25 mg) by mouth once daily at bedtime. 90 tablet 0    aspirin 81 mg EC tablet Take 1 tablet (81 mg) by mouth 2 times a day. 60 tablet 0    BD Ultra-Fine Vandana Pen Needle 32 gauge x 5/32\" needle USE 2 NEEDLES DAILY 200 each 3    blood sugar diagnostic (FreeStyle Lite Strips) strip Use up to 4 times daily as needed for CGM failure 100 each 3    blood-glucose sensor (Dexcom G7 Sensor) device CHANGE EVERY 10 DAYS 6 each 3    candesartan (Atacand) 16 mg tablet TAKE 1 TABLET DAILY 90 tablet 3    cimetidine (Tagamet) 800 mg tablet Take 1 tablet (800 mg) by mouth once daily at bedtime. 90 tablet 0    cyclobenzaprine (Flexeril) 10 mg " tablet Take 1 tablet (10 mg) by mouth 3 times a day as needed for muscle spasms for up to 5 days. 15 tablet 0    Dexcom G4 platinum  (Dexcom G7 ) misc Use as instructed 1 each 0    HumaLOG Mix 75-25 KwikPen 100 unit/mL (75-25) injection INJECT 30 UNITS IN THE MORNING AND 30 UNITS IN THE EVENING 45 mL 3    Jardiance 10 mg TAKE 1 TABLET DAILY 90 tablet 3    metFORMIN (Glucophage) 1,000 mg tablet TAKE 1 TABLET EVERY 12 HOURS 180 tablet 3    pioglitazone (Actos) 30 mg tablet Take 1 tablet (30 mg) by mouth once daily. 90 tablet 0    simvastatin (Zocor) 10 mg tablet Take 1 tablet (10 mg) by mouth once daily. 90 tablet 0    solifenacin (VESIcare) 5 mg tablet Take 1 tablet (5 mg) by mouth once daily. 90 tablet 0     No current facility-administered medications for this visit.         ROS:  The patient is awake and oriented. No dizziness or lightheadedness. No chills and no fever. No headaches. No nausea and no vomiting. No shortness of breath. No cough. No sputum. No chest pain. No chest tightness. No abdominal pain. No diarrhea and no constipation. No hematemesis or hemoptysis. No hematuria. No rectal bleeding. No melena. No epistaxis. No urinary symptoms. No flank pain. No leg edema. No leg pain. No weakness. No itching. Overall, the rest of the review of systems is also negative.  12 point review of systems otherwise negative as stated in HPI.        Physical Exam:        Vitals:    04/26/24 1116   BP: 132/70   Pulse: 105     General: The patient is awake, oriented, and is not in any distress.  Head and Neck: Normocephalic. No periorbital edema.  Respiratory: Symmetric air entry. Symmetric chest expansion.No respiratory distress.  Cardiovascular: Symmetric peripheral pulses.  Skin: No maculopapular rash.  Musculoskeletal: No peripheral edema in both left and right upper extremities.  No edema in either left or right lower extremities.  Neuro Exam: Speech is fluent. Moves extremities.        Blood  Labs:  No results found for this or any previous visit (from the past 24 hour(s)).   Lab Results   Component Value Date    GLUCOSE 147 (H) 04/02/2024    CALCIUM 7.6 (L) 04/02/2024     (L) 04/02/2024    K 4.5 04/02/2024    CO2 20 (L) 04/02/2024     04/02/2024    BUN 23 04/02/2024    CREATININE 1.27 04/02/2024       Imaging:        Assessment and Plan:  #1 chronic kidney disease stage II-III.  Last creatinine was 1.27.  The patient has long history of diabetes.  He also has a single kidney.  I asked for a kidney ultrasound.  PTH, 25-hydroxy vitamin D, microscopic urinalysis, and a spot urine protein to creatinine ratio will be checked.    2.  Hypertension.  Blood pressure is under control.  Continue current medications.    3.  Diabetes.    I will see him in about 2 to 3 weeks for follow-up.    Dani Kothari MD  Senior Attending Physician  Director of Onco-Nephrology Program  Division of Nephrology & Hypertension  [facility]

## 2024-04-26 NOTE — PROGRESS NOTES
PT Initial Evaluation    Patient Name:  Dalton Champion    MRN:  [id number]    :  1955    Today's Date:  24    Time Calculation  Start Time: 915  Stop Time: 955  Time Calculation (min): 40 min  PT Evaluation Time Entry  PT Evaluation (Low) Time Entry: 15  PT Therapeutic Procedures Time Entry  Therapeutic Exercise Time Entry: 15  Self-Care/Home Mgmt Trainin     Informed Consent  Patient has been informed of all evaluation findings and treatment plans and agrees to participate in Physical Therapy services and plans as outlined.    Diagnosis:  Diagnosis and Precautions: R knee OA, R Total Knee Arthroplasty 2024    Goals:   By the end of 10 visits patient will be able to do the following with < 0/10 R KNEE pain:    HEP:  Patient will consistently perform HIS home exercise program for 20-30 minute sessions, 1-2x/day, 3-4 days/week independently by the end of 10 visits.    Basic ADL's:   Patient will perform bADL's/instrumental ADL's for 30 minutes moving between various closed kinetic chain postures.    ROM and Strength:  Patient will demonstrate 5/5 R KNEE strength in all planes and 0-120 degrees R KNEE AROM in all planes to improve their ability to lift, stand, ambulate and perform basic ADL's.    Stair Negotiation:  Patient will be able to ambulate up/down stairs for 1-2 flights at a time.    Gait/Locomotion:  Patient will be able to ambulate for 30-60 minutes at a time.  Minimal to no gait deviation across level ground/stairs.  Patient will demonstrate no R KNEE pain with the following movements:  partial squat, lunge, forward/lateral step-up, HR, stepdown and SLS.    Sleep:  Patient will sleep thru the night 4/7 nights/week.    Participation restrictions:  Increase LEFS to > or = to 55/80 for increased functional ability.    Pain:  Decrease pain at worst to < or = to 0/10 for improved QOL and ability to sleep.    Plan of Care:      Treatment/Interventions: Cryotherapy, Education/ Instruction,  Gait training, Manual therapy, Neuromuscular re-education, Self care/ home management, Taping techniques, Therapeutic activities, Vasopneumatic device, Therapeutic exercises  PT Plan: Skilled PT  PT Frequency: 2 times per week  Duration: 10 visits  Onset Date: 04/26/21  Certification Period Start Date: 04/26/24  Certification Period End Date: 07/25/24  Number of Treatments Authorized: 10  Rehab Potential: Good  Plan of Care Agreement: Patient    PT Assessment:    Patient is a 69 y.o. MALE with c/o R knee pain.   Patient is alert and oriented x 3.  Patient presents with medical diagnosis of R knee OA, R Total Knee Arthroplasty 4/1/2024  contributing to compensatory soft tissue dysfunction, pain, stiffness and weakness of the R knee.   Significant past medical history/past surgical history includes see above.    Skilled care is needed to progress the patient back to these activities without exacerbating symptoms.   Patient requires skilled PT services to address the problems identified and the individualized patient's goals as outlined in the problems and goals section of this evaluation.  A skilled PT is required to address these key impairments and to provide and progress with an appropriate home exercise program. Patient does have any significant PMH influencing Rx and reports motivation to return to FUNCTIONAL ACTIVITY.   Patient demonstrates to be a good candidate for physical therapy with good rehab potential and verbalized a good understanding of HIS diagnosis, prognosis and treatment.  Goals have been established and reviewed with the patient.      PT Assessment Results: Decreased strength, Decreased range of motion, Decreased endurance, Impaired balance, Decreased mobility, Pain, Orthopedic restrictions  Rehab Prognosis: Good  Evaluation/Treatment Tolerance: Patient limited by fatigue, Patient limited by pain    Complexity:  Low complexity evaluation  due to a 15 minute duration, a past medical history WITH  any personal factors and/or comorbidities that could impact the POC, examination of body systems completed on one to two elements, the patient presents with a stable condition.     Prognosis:  Rehab Prognosis: Good    Problem List  Activity Limitations, Decreased Functional Level, Decreased knowledge of HEP, Flexibility, Gait issues, Pain, Range of Motion/joint mobility issues, Strength, and Endurance    Impairments   IMPAIRED ROM LOWER BODY, IMPAIRED STRENGTH LOWER BODY, IMPAIRED GAIT, IMPAIRED BALANCE, IMPAIRED CORE STABILITY, INCREASED PAIN, IMPAIRED FUNCTIONAL ACTIVITY LEVEL, and IMPAIRED FUNCTIONAL MOVEMENT PATTERNS    Functional Limitations:  LIMITATIONS PERFORMING BASIC ADL'S, ISSUES WITH SLEEP, PARTICIPATION IN HOBBIES, PARTICIPATION IN LEISURE ACTIVITIES, and PARTICIPATION IN HOME MANAGEMENT    General Visit Information:  Reason for Referral: PT Evaluate and Treat  Referred By: Dr. Steffen Frost  General Comment: R knee OA, R Total Knee Arthroplasty 4/1/2024    Pre-Cautions:  MANNY Fall Risk Score (The score of 4 or more indicates an increased risk of falling): 0     Medical Precautions:  (diabetes, testicular cancer, OA, 1 kidney))  Post-Surgical Precautions: Right total knee precautions    Protocol:                     Weight Bearing      All primary knees:  weight bearing as tolerated (WBAT) for 4 weeks with assistance.    Progression to a Cane     3 - 4 weeks (able to lift and hold leg off bed/table 3 - 5 seconds)    Exercises     Active range of motion (AROM) flexion and extension:  preferred/encouraged post-op   Passive range of motion (PROM) flexion and extension:  OK post-op if gentle!   Active-assisted range of motion (AAROM) flexion and extenstion:  OK post-op if gentle!   Isometric Quadriceps exercises:  OK post-op   Passive stretch:  OK post-op gently!   Active-assisted stretching (Contract/Relax):  OK post-op gently!   Isotonic Hamstrings (limit 10 lbs.):  3 - 4 weeks   Isotonic Quads (limit 10  - 15 lbs.):  3 - 4 weeks   Stationary Bike:  3 - 4 weeks when able   Hamstring isometrics may start at 4 weeks   Closed chain exercises may start at 4 - 5 weeks    Additional Information     NO! pillows within 6 inches of the popliteal space/crease - NO!   NO! patellar mobilization NO!   May roll to the operative side   Gentle massage to decrease edema, hypersensitivity, and pain is OK   Scar mobilization: OK at 6 - 8 weeks   Neuro-muscular electrical stimulation to quads: after 4 weeks if indicated   CPM:  only used with contractures, post manipulation with epidural    Recreational Activities     Golf:   6 - 8 weeks (putt/chip/short irons)    12 weeks (long irons/woods)      Swimmin - 6 weeks      Bike:   stationary 3 - 4 weeks with high seat    Regular bike outdoors 12 weeks     [place] weeks     Driving Car:  1st follow-up visit, ~ 6 weeks    ADDITIONAL INFORMATION:    Please send copies of therapy notes.    Physician's Signature:                    Steffen Frost MD    Reason for Visit:  PT Evaluate and Treat    Initial Evaluation:  Referred By: Dr. Steffen Frost    Insurance  Insurance reviewed  Name of Insurance:  Medicare  Visit No.  1  * (EVAL) RIGHT TKR; 20% COINSUR / $240 DED / UNLIM V BMN / PA IS NOT REQ / KX MOD AFTER 20TH VISIT;  4 LIFE IS SEC TO MEDICARE AND WILL COV PART B COINSUR AFTER DED IS MET [id number] // Belinda confirmed 24 9:17am     Subjective:    Current Episode  Date of Onset:  3 years ago  Mechanism of injury:  Patient reports having arthritis of his R knee and his R knee has been hurting for about 3 years.    Pain Score:  Pain Assessment: 0-10  Pain Assessment  Pain Assessment: 0-10  Pain Score: 1 (0/10 best, 5/10 worst)  Pain Type: Chronic pain  Pain Location: Knee  Pain Orientation: Right  Pain Descriptors: Aching, Sore  Pain Frequency: Intermittent  Pain Type: Chronic pain  Pain Location: Knee  Pain Orientation: Right  Pain Descriptors: Aching, Sore  Pain Frequency:  "Intermittent    Better with:  ice    Worse with:  prolonged sitting/standing/walking, stairs, squatting    Medical History/Surgical History:  Medical Precautions:  (diabetes, testicular cancer, OA, 1 kidney)    Reviewed medical history form with patient (medications/allergies reviewed with patient).  Current Outpatient Medications   Medication Instructions    amitriptyline (ELAVIL) 25 mg, oral, Nightly    aspirin 81 mg, oral, 2 times daily    BD Ultra-Fine Vandana Pen Needle 32 gauge x 5/32\" needle USE 2 NEEDLES DAILY    blood sugar diagnostic (FreeStyle Lite Strips) strip Use up to 4 times daily as needed for CGM failure    blood-glucose sensor (Dexcom G7 Sensor) device CHANGE EVERY 10 DAYS    candesartan (ATACAND) 16 mg, oral, Daily    cimetidine (TAGAMET) 800 mg, oral, Nightly    cyclobenzaprine (FLEXERIL) 10 mg, oral, 3 times daily PRN    Dexcom G4 platinum  (Dexcom G7 ) misc Use as instructed    HumaLOG Mix 75-25 KwikPen 100 unit/mL (75-25) injection INJECT 30 UNITS IN THE MORNING AND 30 UNITS IN THE EVENING    Jardiance 10 mg, oral, Daily    metFORMIN (GLUCOPHAGE) 1,000 mg, oral, Every 12 hours    pioglitazone (ACTOS) 30 mg, oral, Daily    simvastatin (ZOCOR) 10 mg, oral, Daily    solifenacin (VESICARE) 5 mg, oral, Daily     Radiology:  X-rays yesterday, results not available as of yet    Functional Assessment:  Level of [place]:  Level of [place]: Independent with ADLs and functional transfers    Work Status:  RETIRED  Patient Awareness:  Patient is aware of HIS diagnosis and prognosis.  Social Support/History:  LIVES WITH FAMILY    Objective:  Restrictions as per MD's Protocol if surgical:  see protocol above    Weightbearing Status:  WBAT R LE    Skin:  R knee surgical TKA incision healing and closed, no active signs of infection    Palpation:   no point tenderness over the R knee    Sensation:  Patient denies numbness/tingling of bilateral LOWER extremities.    Gait:  mild antalgic " gait R LE    ROM:  AROM  R knee flexion/extension -5 degrees-86 degrees (98 degrees after heel slides)  L knee AROM WNL's, R hip AROM WNL's, L hip AROM WNL's, R ankle/foot AROM WNL's, and L ankle/foot AROM WNL's    Strength:    R knee 4-/5 all planes, R hip flexion 4-/5  L knee 5/5 all planes, L hip 5/5 all planes, R ankle/foot 5/5 all planes, and L ankle/foot 5/5 all planes    Outcome measure  Lower Extremity Funtional Score (LEFS): 25/80      Treatment  Time in clinic started at  9:15am  Time in clinic ended at  9:55am  Total time in clinic is . 40 minutes  Total timed code time is  38 minutes    Treatment Performed Today:.   PT Initial Evaluation, Therapeutic Exercise, and Self-Care/Home Management HEP  Individual(s) Educated: Patient  Education Provided: Home Exercise Program  Diagnosis and Precautions: R knee OA, R Total Knee Arthroplasty 4/1/2024  Risk and Benefits Discussed with Patient/Caregiver/Other: yes  Patient/Caregiver Demonstrated Understanding: yes  Plan of Care Discussed and Agreed Upon: yes  Patient Response to Education: Patient/Caregiver Verbalized Understanding of Information, Patient/Caregiver Performed Return Demonstration of Exercises/Activities    Patient instructed in a home exercise program, has been given handouts for each of the exercises performed and was given another sheet instructing patient in the amount of reps to perform and the justus to follow while doing the exercises     Access Code: [email or web address]  URL: [email or web address]  Date: 04/26/2024  Prepared by: Ashish Pearl    Exercises  - Supine Bridge  - 1 x daily - 3-4 x weekly - 2 sets - 10 reps - 5-10 hold  - Supine Heel Slide with Strap  - 1 x daily - 3-4 x weekly - 3 sets - 10 reps - 5-10 hold  - Prone Quadriceps Stretch with Strap  - 1 x daily - 3-4 x weekly - 1 sets - 5 reps - 30 hold  - Active Straight Leg Raise with Quad Set  - 1 x daily - 3-4 x weekly - 2 sets - 10 reps - 5-10 hold  - Long Sitting  Knee Extension with Towel Foot Lift  - 1 x daily - 4-5 x weekly - 3 sets - 10 reps - 5-10 hold

## 2024-04-29 ENCOUNTER — OFFICE VISIT (OUTPATIENT)
Dept: PRIMARY CARE | Facility: CLINIC | Age: 69
End: 2024-04-29
Payer: MEDICARE

## 2024-04-29 ENCOUNTER — HOSPITAL ENCOUNTER (OUTPATIENT)
Dept: RADIOLOGY | Facility: CLINIC | Age: 69
Discharge: HOME | End: 2024-04-29
Payer: MEDICARE

## 2024-04-29 ENCOUNTER — LAB (OUTPATIENT)
Dept: LAB | Facility: LAB | Age: 69
End: 2024-04-29
Payer: MEDICARE

## 2024-04-29 VITALS
TEMPERATURE: 97.2 F | HEIGHT: 66 IN | SYSTOLIC BLOOD PRESSURE: 152 MMHG | BODY MASS INDEX: 31.95 KG/M2 | DIASTOLIC BLOOD PRESSURE: 83 MMHG | WEIGHT: 198.8 LBS | HEART RATE: 79 BPM

## 2024-04-29 DIAGNOSIS — N18.31 STAGE 3A CHRONIC KIDNEY DISEASE (MULTI): ICD-10-CM

## 2024-04-29 DIAGNOSIS — Z79.4 TYPE 2 DIABETES MELLITUS WITHOUT COMPLICATION, WITH LONG-TERM CURRENT USE OF INSULIN (MULTI): ICD-10-CM

## 2024-04-29 DIAGNOSIS — E55.9 VITAMIN D DEFICIENCY: Primary | ICD-10-CM

## 2024-04-29 DIAGNOSIS — E78.49 OTHER HYPERLIPIDEMIA: ICD-10-CM

## 2024-04-29 DIAGNOSIS — E08.22 DIABETES MELLITUS DUE TO UNDERLYING CONDITION WITH DIABETIC CHRONIC KIDNEY DISEASE, UNSPECIFIED CKD STAGE, UNSPECIFIED WHETHER LONG TERM INSULIN USE (MULTI): ICD-10-CM

## 2024-04-29 DIAGNOSIS — Z79.899 MEDICATION DOSE INCREASED: ICD-10-CM

## 2024-04-29 DIAGNOSIS — R79.89 ELEVATED TSH: ICD-10-CM

## 2024-04-29 DIAGNOSIS — I10 HTN (HYPERTENSION), BENIGN: Primary | ICD-10-CM

## 2024-04-29 DIAGNOSIS — I10 ESSENTIAL HYPERTENSION: ICD-10-CM

## 2024-04-29 DIAGNOSIS — E66.9 CLASS 1 OBESITY WITH SERIOUS COMORBIDITY AND BODY MASS INDEX (BMI) OF 32.0 TO 32.9 IN ADULT, UNSPECIFIED OBESITY TYPE: ICD-10-CM

## 2024-04-29 DIAGNOSIS — E11.9 TYPE 2 DIABETES MELLITUS WITHOUT COMPLICATION, WITH LONG-TERM CURRENT USE OF INSULIN (MULTI): ICD-10-CM

## 2024-04-29 LAB
25(OH)D3 SERPL-MCNC: 24 NG/ML (ref 30–100)
ANION GAP SERPL CALC-SCNC: 13 MMOL/L (ref 10–20)
BUN SERPL-MCNC: 22 MG/DL (ref 6–23)
CALCIUM SERPL-MCNC: 9.1 MG/DL (ref 8.6–10.3)
CHLORIDE SERPL-SCNC: 102 MMOL/L (ref 98–107)
CO2 SERPL-SCNC: 26 MMOL/L (ref 21–32)
CREAT SERPL-MCNC: 1.33 MG/DL (ref 0.5–1.3)
EGFRCR SERPLBLD CKD-EPI 2021: 57 ML/MIN/1.73M*2
GLUCOSE SERPL-MCNC: 96 MG/DL (ref 74–99)
POTASSIUM SERPL-SCNC: 4.6 MMOL/L (ref 3.5–5.3)
PTH-INTACT SERPL-MCNC: 37 PG/ML (ref 18.5–88)
SODIUM SERPL-SCNC: 136 MMOL/L (ref 136–145)

## 2024-04-29 PROCEDURE — 1159F MED LIST DOCD IN RCRD: CPT | Performed by: INTERNAL MEDICINE

## 2024-04-29 PROCEDURE — 1157F ADVNC CARE PLAN IN RCRD: CPT | Performed by: INTERNAL MEDICINE

## 2024-04-29 PROCEDURE — 3052F HG A1C>EQUAL 8.0%<EQUAL 9.0%: CPT | Performed by: INTERNAL MEDICINE

## 2024-04-29 PROCEDURE — 83970 ASSAY OF PARATHORMONE: CPT

## 2024-04-29 PROCEDURE — 1160F RVW MEDS BY RX/DR IN RCRD: CPT | Performed by: INTERNAL MEDICINE

## 2024-04-29 PROCEDURE — 1036F TOBACCO NON-USER: CPT | Performed by: INTERNAL MEDICINE

## 2024-04-29 PROCEDURE — 4010F ACE/ARB THERAPY RXD/TAKEN: CPT | Performed by: INTERNAL MEDICINE

## 2024-04-29 PROCEDURE — 76770 US EXAM ABDO BACK WALL COMP: CPT | Performed by: RADIOLOGY

## 2024-04-29 PROCEDURE — 99215 OFFICE O/P EST HI 40 MIN: CPT | Performed by: INTERNAL MEDICINE

## 2024-04-29 PROCEDURE — 3048F LDL-C <100 MG/DL: CPT | Performed by: INTERNAL MEDICINE

## 2024-04-29 PROCEDURE — 3077F SYST BP >= 140 MM HG: CPT | Performed by: INTERNAL MEDICINE

## 2024-04-29 PROCEDURE — 36415 COLL VENOUS BLD VENIPUNCTURE: CPT

## 2024-04-29 PROCEDURE — 3008F BODY MASS INDEX DOCD: CPT | Performed by: INTERNAL MEDICINE

## 2024-04-29 PROCEDURE — 3079F DIAST BP 80-89 MM HG: CPT | Performed by: INTERNAL MEDICINE

## 2024-04-29 PROCEDURE — 3061F NEG MICROALBUMINURIA REV: CPT | Performed by: INTERNAL MEDICINE

## 2024-04-29 PROCEDURE — 80048 BASIC METABOLIC PNL TOTAL CA: CPT

## 2024-04-29 PROCEDURE — 76770 US EXAM ABDO BACK WALL COMP: CPT

## 2024-04-29 PROCEDURE — 82306 VITAMIN D 25 HYDROXY: CPT

## 2024-04-29 RX ORDER — CANDESARTAN 32 MG/1
32 TABLET ORAL DAILY
Qty: 30 TABLET | Refills: 11 | Status: SHIPPED | OUTPATIENT
Start: 2024-04-29 | End: 2025-04-29

## 2024-04-29 ASSESSMENT — PATIENT HEALTH QUESTIONNAIRE - PHQ9
1. LITTLE INTEREST OR PLEASURE IN DOING THINGS: NOT AT ALL
SUM OF ALL RESPONSES TO PHQ9 QUESTIONS 1 AND 2: 0
2. FEELING DOWN, DEPRESSED OR HOPELESS: NOT AT ALL

## 2024-04-29 NOTE — PROGRESS NOTES
"Subjective   Patient ID: Dalton Champion is a 69 y.o. male who presents for Follow-up.    HPI Pt is a pleasant 69 y.o. CM who was seen and evaluated during the FU OV, Pt had a recent RTKR surgery, recovered well. FU with the ortho team and PT. Pt states that his fasting BG level is 110-130s. Pt states that overall he feels ok, except of the soreness at the post surgical knee area. During the clinical encounter pt denies fever, chills, no SOB, no chest pain/tightness, no abdominal pain, no N/V/D reported, no change of urination reported. Pt denies any other health concerns during this visit.  Sohx: reviewed  List of the medications and allergies: reviewed  PMHx and Fhx: reviewed    Review of Systems  The systems have been reviewed as follows:   Constitutional: no fever, no chills  Eyes: no eyesight problems, no eye redness, no eye pain, no blurred vision  ENT: no ear pain or sore throat, no nasal discharge or congestion, no sinus pressure, no hearing loss  Cardiovascular: no chest pain or tightness, no SOB, the heart rate was not fast or slow  Respiratory: no cough, no dyspnea with exertion or with rest, no wheezing  Gastrointestinal: no abdominal pain, no constipation or diarrhea, no heartburn, no nausea or vomiting  Genitourinary: no urine frequency, no dysuria, no urinary urgency, no urinary incontinence or retention  Musculoskeletal: no back pain,  no muscle weakness, but as mentioned at HPI  Integumentary: no skin lesions or rash  Neurological: no headaches, no dizziness or fainting, no limb weakness  Psychiatric: no mood changes, no anxiety or depression, no SI/HI  Endocrine: no weight change, no temperature intolerance, no change of appetite  Hematologic/Lymphatic: no easy bruising or bleeding  Objective   /83 (BP Location: Left arm, Patient Position: Sitting)   Pulse 79   Temp 36.2 °C (97.2 °F)   Ht 1.676 m (5' 6\")   Wt 90.2 kg (198 lb 12.8 oz)   BMI 32.09 kg/m²     Physical Exam  Constitutional: " well hydrated, well nourished, no acute distress. Vital signs reviewed  Head and face: normocephalic, atraumatic  Eyes: no erythema, edema or visible abnormalities of conjunctiva or lids. Pupils are equal, round and reactive to light. Extraocular movement normal  ENT: external ears without deformities  Neck: full ROM, no adenopathy, neck was supple  Pulmonary: normal respiratory rate and effort. Lungs are clear to auscultation, no rales,no rhonchi or wheezing  Cardiovascular: RRR, normal S1 and S2, no murmur, no gallop, posterior tib. pulse normal 2+ bilaterally, no lower extremity edema  Abdomen: soft, non tender on palpation, not distended, normal BS in all 4 quadrants  Musculoskeletal: no joint swelling, normal movements of all 4 extremities. Gait and station: pt is using a walking cane, Digits and nails: normal without clubbing. The mid line post surgical incision noticed in the right pattellar area  Skin: normal color, no rash  Neurologic: Cranial nerves: CN II: visual acuity intact. Source: acuity reported by patient. Pupils reactive to light with normal accommodation. Right and left pupil normal CN III, IV and VI: the EO movements were intact. CN VIII: no hearing loss. Deep tendon reflexes: were 2+ and symmetric:Sensory exam: normal to light touch  Psychiatric: Mood and affect: normal, Alert and Oriented x 3  Lymphatic: no cervical lymphadenopathy  Assessment/Plan   HTN: controlled suboptimally  Will increase the dose of candesartan up to 32 mg PO daily. Pt was educated about the normal BP readings as well as about the possible SE of the medication. Pt was instructed to check BP on the regular basis and contact to PCP if the BP readings will be elevated  Abnormal TSH: Will repeat TSH/T4 level in 3 months  DM t2: continue on the current dose of the medication. Pt has the upcoming OV with the endocrinology team. HGBa1C level  in 3 months  HLD: will check lipid panel on 3 months. Continue on simvastatin 10 mg po  daily  Obesity WHO class I with severe co morbidities: the lifestyle modifications were discussed  I discussed every sxs with the pt in detail. Pt verbalized understanding of the health  condition and agreed with the clinical approach as discussed as mentioned above  Please FU with PCP as planned or sooner if needed.

## 2024-04-30 ENCOUNTER — TREATMENT (OUTPATIENT)
Dept: PHYSICAL THERAPY | Facility: CLINIC | Age: 69
End: 2024-04-30
Payer: MEDICARE

## 2024-04-30 DIAGNOSIS — M17.11 PRIMARY OSTEOARTHRITIS OF RIGHT KNEE: ICD-10-CM

## 2024-04-30 PROCEDURE — 97110 THERAPEUTIC EXERCISES: CPT | Mod: GP | Performed by: PHYSICAL THERAPIST

## 2024-04-30 RX ORDER — ERGOCALCIFEROL 1.25 MG/1
50000 CAPSULE ORAL
Qty: 6 CAPSULE | Refills: 2 | Status: SHIPPED | OUTPATIENT
Start: 2024-04-30

## 2024-04-30 ASSESSMENT — PAIN SCALES - GENERAL: PAINLEVEL_OUTOF10: 1

## 2024-04-30 ASSESSMENT — PAIN - FUNCTIONAL ASSESSMENT: PAIN_FUNCTIONAL_ASSESSMENT: 0-10

## 2024-04-30 NOTE — PROGRESS NOTES
PHYSICAL THERAPY TREATMENT    Patient name:  Dalton Champion    MRN:  [id number]    :  1955    Today's Date:  24    Time Calculation  Start Time: 1443  Stop Time: 1527  Time Calculation (min): 44 min     PT Therapeutic Procedures Time Entry  Therapeutic Exercise Time Entry: 23     Referral by:  Referred By: Dr. Steffen Frost    Diagnoses:  Diagnosis and Precautions: R knee OA, R Total Knee Arthroplasty 2024    Assessment/Plan:  Therapeutic exercise performed in order to improve R knee strength/ROM/mobility.  Patient requires increased tactile/verbal cues with exercise in order to reduce R knee stress/strain during the particular exercise performed.  Patient challenged with exercises performed in clinic secondary to R knee fatigue.   PT Assessment  PT Assessment Results: Decreased strength, Decreased range of motion, Decreased endurance, Impaired balance, Decreased mobility, Pain, Orthopedic restrictions  Rehab Prognosis: Good  Evaluation/Treatment Tolerance: Patient limited by fatigue, Patient limited by pain  OP PT Plan  PT Plan: Skilled PT  Rehab Potential: Good  Plan of Care Agreement: Patient    General Visit Information  PT  Visit  PT Received On: 24    General  Reason for Referral: PT Evaluate and Treat  Referred By: Dr. Steffen Frost  General Comment: R knee OA, R Total Knee Arthroplasty 2024    Insurance  Insurance reviewed  Name of Insurance:  Medicare  Visit No.  2  RIGHT TKR; 20% COINSUR / $240 DED / UNLIM V BMN / PA IS NOT REQ / KX MOD AFTER 20TH VISIT;  4 LIFE IS SEC TO MEDICARE AND WILL COV PART B COINSUR AFTER DED IS MET [id number] // Belinda confirmed 24 9:17am     Subjective:  Patient reports that his R knee is doing well this afternoon.    Precautions  STEADI Fall Risk Score (The score of 4 or more indicates an increased risk of falling): 0  Medical Precautions:  (diabetes, testicular cancer, OA, 1 kidney)  Post-Surgical Precautions: Right total knee  precautions    Pain:  Pain Assessment  Pain Assessment: 0-10  Pain Score: 1  Pain Type: Chronic pain  Pain Location: Knee  Pain Orientation: Right    ROM  R knee flexion AROM 99 degrees  R knee flexion AAROM 106 degrees  R knee flexion PROM 118 degrees  R knee extension -2 degrees AROM    Treatments    Therapeutic Exercise  Therapeutic Exercise Performed: Yes  Therapeutic Exercise Activity 1: Lunge stretch on step x 20 reps  Therapeutic Exercise Activity 2: Mini-Squats x 20 reps  Therapeutic Exercise Activity 3: Step-ups forward/lateral step-ups 4 inch step, x 20 reps each  Therapeutic Exercise Activity 4: Recumbent bike x 8 minutes  Therapeutic Exercise Activity 5: Cybex Leg Press 1 plate, x 20 reps  Therapeutic Exercise Activity 6: stool scoots x 3 laps  Therapeutic Exercise Activity 7: supine bridges x 30 reps  Therapeutic Exercise Activity 8: supine heel slides x 30 reps    Treatment  Time in clinic started at:   2:43pm  Time in clinic ended at:   3:27pm  Total time in clinic is:   44 minutes  Total timed code time is:  23 minutes    Treatment Performed Today:.   Therapeutic Exercise x 2 units (23 minutes one on one)

## 2024-05-02 ENCOUNTER — OFFICE VISIT (OUTPATIENT)
Dept: ENDOCRINOLOGY | Facility: CLINIC | Age: 69
End: 2024-05-02
Payer: MEDICARE

## 2024-05-02 VITALS
DIASTOLIC BLOOD PRESSURE: 64 MMHG | HEIGHT: 66 IN | BODY MASS INDEX: 31.82 KG/M2 | SYSTOLIC BLOOD PRESSURE: 118 MMHG | WEIGHT: 198 LBS

## 2024-05-02 DIAGNOSIS — I10 HTN (HYPERTENSION), BENIGN: ICD-10-CM

## 2024-05-02 DIAGNOSIS — E11.9 TYPE 2 DIABETES MELLITUS WITHOUT COMPLICATION, WITH LONG-TERM CURRENT USE OF INSULIN (MULTI): ICD-10-CM

## 2024-05-02 DIAGNOSIS — Z79.4 LONG-TERM INSULIN USE (MULTI): ICD-10-CM

## 2024-05-02 DIAGNOSIS — E11.69 TYPE 2 DIABETES MELLITUS WITH OTHER SPECIFIED COMPLICATION, WITH LONG-TERM CURRENT USE OF INSULIN (MULTI): Primary | ICD-10-CM

## 2024-05-02 DIAGNOSIS — Z79.4 TYPE 2 DIABETES MELLITUS WITH OTHER SPECIFIED COMPLICATION, WITH LONG-TERM CURRENT USE OF INSULIN (MULTI): Primary | ICD-10-CM

## 2024-05-02 DIAGNOSIS — Z79.4 TYPE 2 DIABETES MELLITUS WITHOUT COMPLICATION, WITH LONG-TERM CURRENT USE OF INSULIN (MULTI): ICD-10-CM

## 2024-05-02 DIAGNOSIS — E78.5 HYPERLIPIDEMIA, UNSPECIFIED HYPERLIPIDEMIA TYPE: ICD-10-CM

## 2024-05-02 RX ORDER — BLOOD-GLUCOSE SENSOR
EACH MISCELLANEOUS
Qty: 6 EACH | Refills: 3 | Status: SHIPPED | OUTPATIENT
Start: 2024-05-02 | End: 2024-05-06 | Stop reason: SDUPTHER

## 2024-05-02 RX ORDER — CHLORHEXIDINE GLUCONATE ORAL RINSE 1.2 MG/ML
SOLUTION DENTAL
COMMUNITY
Start: 2024-03-12 | End: 2024-05-17 | Stop reason: WASHOUT

## 2024-05-02 ASSESSMENT — ENCOUNTER SYMPTOMS
SORE THROAT: 0
ARTHRALGIAS: 0
AGITATION: 0
ABDOMINAL DISTENTION: 0
DIARRHEA: 0
SLEEP DISTURBANCE: 0
HEADACHES: 0
FREQUENCY: 0
CHEST TIGHTNESS: 0
LIGHT-HEADEDNESS: 0
DYSURIA: 0
TROUBLE SWALLOWING: 0
TREMORS: 0
VOICE CHANGE: 0
PHOTOPHOBIA: 0
PALPITATIONS: 0
EYE ITCHING: 0
VOMITING: 0
CONSTIPATION: 0
CONSTITUTIONAL NEGATIVE: 1
SHORTNESS OF BREATH: 0
ABDOMINAL PAIN: 0
NERVOUS/ANXIOUS: 0
NAUSEA: 0

## 2024-05-02 NOTE — PROGRESS NOTES
Subjective   Patient ID: Dalton Champion is a 69 y.o. male who presents for Diabetes (Dx DM: around 1999/[facility] DM: father, grandparent, brother/PCP: [facility] /Podiatry: does not see one /Patient testing glucose 4 times daily with dexcom G7-LINKED. Due to fluctuating blood glucose and hypoglycemia. Patient is adjusting insulin based on glucose readings at meals. Compliant with DM regimen. /4/1/24 right knee replacement /).  Lab Results   Component Value Date    HGBA1C 8.3 (H) 03/12/2024      HPI    Review of Systems   Constitutional: Negative.    HENT:  Negative for sore throat, trouble swallowing and voice change.    Eyes:  Negative for photophobia, itching and visual disturbance.   Respiratory:  Negative for chest tightness and shortness of breath.    Cardiovascular:  Negative for chest pain and palpitations.   Gastrointestinal:  Negative for abdominal distention, abdominal pain, constipation, diarrhea, nausea and vomiting.   Endocrine: Negative for cold intolerance, heat intolerance and polyuria.   Genitourinary:  Negative for dysuria and frequency.   Musculoskeletal:  Negative for arthralgias.   Skin:  Negative for pallor.   Allergic/Immunologic: Negative for environmental allergies.   Neurological:  Negative for tremors, light-headedness and headaches.   Psychiatric/Behavioral:  Negative for agitation and sleep disturbance. The patient is not nervous/anxious.        Objective   Physical Exam  Constitutional:       Appearance: Normal appearance.   HENT:      Head: Normocephalic.      Nose: Nose normal.      Mouth/Throat:      Mouth: Mucous membranes are moist.   Eyes:      Extraocular Movements: Extraocular movements intact.   Cardiovascular:      Rate and Rhythm: Normal rate.   Pulmonary:      Effort: Pulmonary effort is normal. No respiratory distress.   Abdominal:      General: There is no distension.   Musculoskeletal:         General: Normal range of motion.      Cervical back: Normal range of motion and neck  "supple.   Skin:     General: Skin is warm and dry.   Neurological:      Mental Status: He is alert and oriented to person, place, and time.   Psychiatric:         Mood and Affect: Mood normal.      Visit Vitals  /64   Ht 1.676 m (5' 6\")   Wt 89.8 kg (198 lb)   BMI 31.96 kg/m²   Smoking Status Former   BSA 2.04 m²        Assessment/Plan   Diagnoses and all orders for this visit:  Type 2 diabetes mellitus with other specified complication, with long-term current use of insulin (Multi)  -     blood-glucose sensor (Dexcom G7 Sensor) device; CHANGE EVERY 10 DAYS  HTN (hypertension), benign  Hyperlipidemia, unspecified hyperlipidemia type  Type 2 diabetes mellitus without complication, with long-term current use of insulin (Multi)  Long-term insulin use (Multi)         T2DM , uncontrolled   Dx DM: around 1999     Current regimen:   pioglitzaone 15mg qd   metformin 1000mg BID  Humalog 75/25 30-0-30-0  ( dinner at 6 -has been taking it before eating )    Jardiance 10 mg daily ( started may 2023  ) denies any side effects     Downloaded Dexcom G7 reviewed and interpreted-   Active time 93%.  85% in range 3% low  Low blood sugars seems to be false when he checks on fingerstick      A1c 8.3%  Above goal but has continuous glucose monitor download shows, good blood sugar control   he mentions he has only one kidney functioning  he has constipation and heartburn for which he takes meds: GLP1 agonist may make it worse      Knee arthroscopy-April 2024    PLAN :   Increase oral hydration given he is on Jardiance  Continue JARDIANCE/ METFORMIN AND PIOGLITAZONE     FOR ANY STEROID INJECTION IN THE JOINT , TAKE INSULIN 35 UNITS BEFORE FIRST AND 35 UNIST BEFORE DINNER FOR 4 DAYS AFTER THE INJECTIONS.     - DIscussed hypoglycemia and the Management of hypoglycemia.   - on statin and ARB   Continue G7.  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

## 2024-05-06 DIAGNOSIS — E11.69 TYPE 2 DIABETES MELLITUS WITH OTHER SPECIFIED COMPLICATION, WITH LONG-TERM CURRENT USE OF INSULIN (MULTI): ICD-10-CM

## 2024-05-06 DIAGNOSIS — Z79.4 TYPE 2 DIABETES MELLITUS WITH OTHER SPECIFIED COMPLICATION, WITH LONG-TERM CURRENT USE OF INSULIN (MULTI): ICD-10-CM

## 2024-05-06 RX ORDER — BLOOD-GLUCOSE SENSOR
EACH MISCELLANEOUS
Qty: 6 EACH | Refills: 3 | Status: CANCELLED | OUTPATIENT
Start: 2024-05-06

## 2024-05-06 RX ORDER — BLOOD-GLUCOSE SENSOR
EACH MISCELLANEOUS
Qty: 6 EACH | Refills: 3 | Status: SHIPPED | OUTPATIENT
Start: 2024-05-06

## 2024-05-07 ENCOUNTER — TREATMENT (OUTPATIENT)
Dept: PHYSICAL THERAPY | Facility: CLINIC | Age: 69
End: 2024-05-07
Payer: MEDICARE

## 2024-05-07 DIAGNOSIS — M17.11 PRIMARY OSTEOARTHRITIS OF RIGHT KNEE: ICD-10-CM

## 2024-05-07 PROCEDURE — 97110 THERAPEUTIC EXERCISES: CPT | Mod: GP,CQ

## 2024-05-07 ASSESSMENT — PAIN SCALES - GENERAL: PAINLEVEL_OUTOF10: 0 - NO PAIN

## 2024-05-07 ASSESSMENT — PAIN - FUNCTIONAL ASSESSMENT: PAIN_FUNCTIONAL_ASSESSMENT: 0-10

## 2024-05-07 NOTE — PROGRESS NOTES
PHYSICAL THERAPY TREATMENT    Patient name:  Dalton Champion    MRN:  [id number]    :  1955    Today's Date:  24    Time Calculation  Start Time: 237  Stop Time: 318  Time Calculation (min): 41 min     PT Therapeutic Procedures Time Entry  Therapeutic Exercise Time Entry: 41     Referral by:  Referred By: Dr. Steffen Frost     Diagnoses:   R knee OA, R Total Knee Arthroplasty 2024     Assessment/Plan:  Therapeutic exercise performed in order to improve R knee strength/ROM/mobility.  Patient requires increased tactile/verbal cues with exercise in order to reduce R knee stress/strain during the particular exercise performed.  Patient challenged with exercises performed in clinic secondary to R knee fatigue but had no complaints of increased pain.    PT Assessment  PT Assessment Results: Decreased strength, Decreased range of motion, Decreased endurance, Impaired balance, Decreased mobility, Pain, Orthopedic restrictions  OP PT Plan  PT Plan: Skilled PT    General Visit Information       General  Referred By: Dr. Steffen Frost  General Comment: R knee OA, R Total Knee Arthroplasty 2024    Insurance  Insurance reviewed  Name of Insurance:  Medicare  Visit No.  3  RIGHT TKR; 20% COINSUR / $240 DED / UNLIM V BMN / PA IS NOT REQ / KX MOD AFTER 20TH VISIT;  4 LIFE IS SEC TO MEDICARE AND WILL COV PART B COINSUR AFTER DED IS MET [id number] // Belinda confirmed 24 9:17am     Subjective:    Pt denies current right knee pain. He states that he has had some intermittent pain in the arch of his right foot.   Precautions  STEADI Fall Risk Score (The score of 4 or more indicates an increased risk of falling): 0  Medical Precautions:  (diabetes, testicular cancer, OA, 1 kidney)  Post-Surgical Precautions: Right total knee precautions    Pain:  Pain Assessment  Pain Assessment: 0-10  Pain Score: 0 - No pain  Pain Location: Knee  Pain Orientation: Right    ROM  R knee flexion AROM 102  R knee  flexion AAROM 110    R knee extension -2    Treatments     Therapeutic Exercise  Long sitting hamstring stretch 30 sec x 3   Calf stretch 30 sec x 3    Lunge stretch on step x 10 reps   Mini-Squats 2 x 10 reps  Step-ups forward/lateral step-ups 6 inch step, x 10 reps each   Recumbent bike x 6 minutes  Cybex Leg Press 1 plate, x 10 reps   stool scoots x 3 laps NT  LAQ with eccentric focus x10  Seated Hamstring curl RTB x10   supine bridges x 30 reps  supine heel slides x 30 reps  Heel toe raises x10  Standing 3 way hip 2# x10 B     Treatment  Time in clinic started at:  2:37 pm  Time in clinic ended at:  3:18 pm  Total time in clinic is:  41 minutes  Total timed code time is: 41 minutes    Treatment Performed Today:.   Therapeutic Exercise x  3 units

## 2024-05-09 ENCOUNTER — TREATMENT (OUTPATIENT)
Dept: PHYSICAL THERAPY | Facility: CLINIC | Age: 69
End: 2024-05-09
Payer: MEDICARE

## 2024-05-09 DIAGNOSIS — M17.11 PRIMARY OSTEOARTHRITIS OF RIGHT KNEE: ICD-10-CM

## 2024-05-09 DIAGNOSIS — M17.11 UNILATERAL PRIMARY OSTEOARTHRITIS, RIGHT KNEE: Primary | ICD-10-CM

## 2024-05-09 PROCEDURE — 97110 THERAPEUTIC EXERCISES: CPT | Mod: GP | Performed by: PHYSICAL THERAPIST

## 2024-05-09 ASSESSMENT — PAIN - FUNCTIONAL ASSESSMENT: PAIN_FUNCTIONAL_ASSESSMENT: 0-10

## 2024-05-09 NOTE — PROGRESS NOTES
PHYSICAL THERAPY TREATMENT    Patient name:  Dalton Champion    MRN:  [id number]    :  1955    Today's Date:  24    Time Calculation  Start Time:   Stop Time: 172  Time Calculation (min): 41 min     PT Therapeutic Procedures Time Entry  Therapeutic Exercise Time Entry: 23     Referral by:  Referred By: Dr. Steffen Frost    Diagnoses:  Diagnosis and Precautions: R knee OA, R Total Knee Arthroplasty 2024    Assessment/Plan:  Therapeutic exercise performed in order to improve R knee strength/ROM/mobility.  Patient requires increased tactile/verbal cues with exercise in order to reduce R knee stress/strain during the particular exercise performed.  Patient challenged with exercises performed in clinic secondary to R knee fatigue.   PT Assessment  PT Assessment Results: Decreased strength, Decreased range of motion, Decreased endurance, Impaired balance, Decreased mobility, Pain, Orthopedic restrictions  Rehab Prognosis: Good  OP PT Plan  PT Plan: Skilled PT  Rehab Potential: Good  Plan of Care Agreement: Patient    General Visit Information  PT  Visit  PT Received On: 24    General  Reason for Referral: PT Evaluate and Treat  Referred By: Dr. Steffen Frost  General Comment: R knee OA, R Total Knee Arthroplasty 2024    Insurance  Insurance reviewed  Name of Insurance:  Medicare  Visit No.  4  RIGHT TKR; 20% COINSUR / $240 DED / UNLIM V BMN / PA IS NOT REQ / KX MOD AFTER 20TH VISIT;  4 LIFE IS SEC TO MEDICARE AND WILL COV PART B COINSUR AFTER DED IS MET [id number] // Belinda confirmed 24 9:17am     Subjective:  Patient reports that his R knee is doing well overall.    Precautions  STEADI Fall Risk Score (The score of 4 or more indicates an increased risk of falling): 0  Medical Precautions:  (diabetes, testicular cancer, OA, 1 kidney)  Post-Surgical Precautions: Right total knee precautions    Pain:  Pain Assessment  Pain Assessment: 0-10  Pain Location: Knee  Pain Orientation:  Right    ROM  R knee flexion AROM 104 degrees before heel slides, 105 degrees after heel slides  R knee flexion AAROM 112 degrees  R knee flexion PROM 125 degrees    Treatments    Therapeutic Exercise  Therapeutic Exercise Performed: Yes  Therapeutic Exercise Activity 1: Lunge stretch on step x 20 reps  Therapeutic Exercise Activity 2: Mini-Squats x 20 reps  Therapeutic Exercise Activity 3: Step-ups forward/lateral step-ups 4 inch step, x 20 reps each  Therapeutic Exercise Activity 4: Recumbent bike x 8 minutes  Therapeutic Exercise Activity 5: Cybex Leg Press 1 plate, x 20 reps  Therapeutic Exercise Activity 6: stool scoots x 3 laps  Therapeutic Exercise Activity 7: supine bridges x 30 reps  Therapeutic Exercise Activity 8: supine heel slides x 30 reps    Treatment  Time in clinic started at:   4:44pm  Time in clinic ended at:   5:25pm  Total time in clinic is:   41 minutes  Total timed code time is:   23 minutes    Treatment Performed Today:.   Therapeutic Exercise x 2 units (one on one)  No charge from 4:44pm-5pm

## 2024-05-14 ENCOUNTER — TREATMENT (OUTPATIENT)
Dept: PHYSICAL THERAPY | Facility: CLINIC | Age: 69
End: 2024-05-14
Payer: MEDICARE

## 2024-05-14 DIAGNOSIS — M17.11 PRIMARY OSTEOARTHRITIS OF RIGHT KNEE: ICD-10-CM

## 2024-05-14 PROCEDURE — 97110 THERAPEUTIC EXERCISES: CPT | Mod: GP | Performed by: PHYSICAL THERAPIST

## 2024-05-14 ASSESSMENT — PAIN SCALES - GENERAL: PAINLEVEL_OUTOF10: 1

## 2024-05-14 ASSESSMENT — PAIN - FUNCTIONAL ASSESSMENT: PAIN_FUNCTIONAL_ASSESSMENT: 0-10

## 2024-05-14 NOTE — PROGRESS NOTES
PHYSICAL THERAPY TREATMENT    Patient name:  Dalton Champion    MRN:  [id number]    :  1955    Today's Date:  24    Time Calculation  Start Time: 1045  Stop Time: 7  Time Calculation (min): 42 min     PT Therapeutic Procedures Time Entry  Therapeutic Exercise Time Entry: 38     Referral by:  Referred By: Dr. Steffen Frost    Diagnoses:  Diagnosis and Precautions: R knee OA, R Total Knee Arthroplasty 2024    Assessment/Plan:  Therapeutic exercise performed in order to improve R knee strength/ROM/mobility.  Patient requires increased tactile/verbal cues with exercise in order to reduce R knee stress/strain during the particular exercise performed.  Patient challenged with exercises performed in clinic secondary to R knee fatigue.   PT Assessment  PT Assessment Results: Decreased strength, Decreased range of motion, Decreased endurance, Impaired balance, Decreased mobility, Pain, Orthopedic restrictions  Rehab Prognosis: Good  Evaluation/Treatment Tolerance: Patient tolerated treatment well  OP PT Plan  PT Plan: Skilled PT  Rehab Potential: Good  Plan of Care Agreement: Patient    General Visit Information  PT  Visit  PT Received On: 24    General  Reason for Referral: PT Evaluate and Treat  Referred By: Dr. Steffen Frost  General Comment: R knee OA, R Total Knee Arthroplasty 2024    Insurance  Insurance reviewed  Name of Insurance:  Medicare  Visit No.  5  RIGHT TKR; 20% COINSUR / $240 DED / UNLIM V BMN / PA IS NOT REQ / KX MOD AFTER 20TH VISIT;  4 LIFE IS SEC TO MEDICARE AND WILL COV PART B COINSUR AFTER DED IS MET [id number] // Belinda confirmed 24 9:17am     Subjective:  Patient reports that his R knee continues to feel better.    Precautions  STEADI Fall Risk Score (The score of 4 or more indicates an increased risk of falling): 0  Medical Precautions:  (diabetes, testicular cancer, OA, 1 kidney)  Post-Surgical Precautions: Right total knee precautions    Pain:  Pain  Assessment  Pain Assessment: 0-10  Pain Score: 1  Pain Location: Knee  Pain Orientation: Right    ROM  R knee flexion AROM 105 degrees before heel slides, 110 degrees after heel slides  R knee flexion AAROM 113 degrees    Treatments    Therapeutic Exercise  Therapeutic Exercise Performed: Yes  Therapeutic Exercise Activity 1: Lunge stretch on step x 20 reps  Therapeutic Exercise Activity 2: Mini-Squats x 30 reps  Therapeutic Exercise Activity 3: Step-ups forward/lateral step-ups 6 inch step, x 30 reps each  Therapeutic Exercise Activity 4: Recumbent bike x 8 minutes  Therapeutic Exercise Activity 5: Cybex Leg Press 3 plates, x 20 reps  Therapeutic Exercise Activity 6: stool scoots x 3 laps  Therapeutic Exercise Activity 7: supine bridges x 30 reps  Therapeutic Exercise Activity 8: supine heel slides x 30 reps    Treatment  Time in clinic started at:   10:45am  Time in clinic ended at:   11:27am  Total time in clinic is:    42 minutes  Total timed code time is:   38 minutes    Treatment Performed Today:.   Therapeutic Exercise x 3 units

## 2024-05-16 ENCOUNTER — APPOINTMENT (OUTPATIENT)
Dept: PHYSICAL THERAPY | Facility: CLINIC | Age: 69
End: 2024-05-16
Payer: MEDICARE

## 2024-05-17 ENCOUNTER — OFFICE VISIT (OUTPATIENT)
Dept: NEPHROLOGY | Facility: CLINIC | Age: 69
End: 2024-05-17
Payer: MEDICARE

## 2024-05-17 VITALS
SYSTOLIC BLOOD PRESSURE: 119 MMHG | DIASTOLIC BLOOD PRESSURE: 68 MMHG | BODY MASS INDEX: 32.78 KG/M2 | HEIGHT: 66 IN | HEART RATE: 71 BPM | WEIGHT: 204 LBS

## 2024-05-17 DIAGNOSIS — I10 ESSENTIAL HYPERTENSION: ICD-10-CM

## 2024-05-17 DIAGNOSIS — E55.9 VITAMIN D DEFICIENCY: Primary | ICD-10-CM

## 2024-05-17 DIAGNOSIS — N18.30 STAGE 3 CHRONIC KIDNEY DISEASE, UNSPECIFIED WHETHER STAGE 3A OR 3B CKD (MULTI): ICD-10-CM

## 2024-05-17 DIAGNOSIS — E08.22 DIABETES MELLITUS DUE TO UNDERLYING CONDITION WITH DIABETIC CHRONIC KIDNEY DISEASE, UNSPECIFIED CKD STAGE, UNSPECIFIED WHETHER LONG TERM INSULIN USE (MULTI): ICD-10-CM

## 2024-05-17 PROCEDURE — 1160F RVW MEDS BY RX/DR IN RCRD: CPT | Performed by: INTERNAL MEDICINE

## 2024-05-17 PROCEDURE — 3078F DIAST BP <80 MM HG: CPT | Performed by: INTERNAL MEDICINE

## 2024-05-17 PROCEDURE — 3061F NEG MICROALBUMINURIA REV: CPT | Performed by: INTERNAL MEDICINE

## 2024-05-17 PROCEDURE — 1159F MED LIST DOCD IN RCRD: CPT | Performed by: INTERNAL MEDICINE

## 2024-05-17 PROCEDURE — 1157F ADVNC CARE PLAN IN RCRD: CPT | Performed by: INTERNAL MEDICINE

## 2024-05-17 PROCEDURE — 3008F BODY MASS INDEX DOCD: CPT | Performed by: INTERNAL MEDICINE

## 2024-05-17 PROCEDURE — 3052F HG A1C>EQUAL 8.0%<EQUAL 9.0%: CPT | Performed by: INTERNAL MEDICINE

## 2024-05-17 PROCEDURE — 1036F TOBACCO NON-USER: CPT | Performed by: INTERNAL MEDICINE

## 2024-05-17 PROCEDURE — 99214 OFFICE O/P EST MOD 30 MIN: CPT | Performed by: INTERNAL MEDICINE

## 2024-05-17 PROCEDURE — 3048F LDL-C <100 MG/DL: CPT | Performed by: INTERNAL MEDICINE

## 2024-05-17 PROCEDURE — 4010F ACE/ARB THERAPY RXD/TAKEN: CPT | Performed by: INTERNAL MEDICINE

## 2024-05-17 PROCEDURE — 3074F SYST BP LT 130 MM HG: CPT | Performed by: INTERNAL MEDICINE

## 2024-05-17 NOTE — PROGRESS NOTES
"Dalton GERBER Akira   69 y.o.    @@  N/Room: [id number]/Room/bed info not found    Subjective:   The patient is being seen for a routine clinic follow-up of chronic kidney disease. Recently, the disease has been stable. Disease complications:  No hyperkalemia, no hypocalcemia, no hyperphosphatemia, no metabolic acidosis, no coagulopathy, no uremic encephalopathy, no neuropathy and no renal osteodystrophy. The patient is currently asymptomatic. No associated symptoms are reported.       Meds:   Current Outpatient Medications   Medication Sig Dispense Refill    amitriptyline (Elavil) 25 mg tablet Take 1 tablet (25 mg) by mouth once daily at bedtime. 90 tablet 0    BD Ultra-Fine Vandana Pen Needle 32 gauge x 5/32\" needle USE 2 NEEDLES DAILY 200 each 3    blood sugar diagnostic (FreeStyle Lite Strips) strip Use up to 4 times daily as needed for CGM failure 100 each 3    blood-glucose sensor (Dexcom G7 Sensor) device CHANGE EVERY 10 DAYS 6 each 3    candesartan (Atacand) 32 mg tablet Take 1 tablet (32 mg) by mouth once daily. 30 tablet 11    cimetidine (Tagamet) 800 mg tablet Take 1 tablet (800 mg) by mouth once daily at bedtime. 90 tablet 0    ergocalciferol (Vitamin D-2) 1.25 MG (57706 UT) capsule Take 1 capsule (50,000 Units) by mouth every 14 (fourteen) days. 6 capsule 2    HumaLOG Mix 75-25 KwikPen 100 unit/mL (75-25) injection INJECT 30 UNITS IN THE MORNING AND 30 UNITS IN THE EVENING 45 mL 3    Jardiance 10 mg TAKE 1 TABLET DAILY 90 tablet 3    metFORMIN (Glucophage) 1,000 mg tablet TAKE 1 TABLET EVERY 12 HOURS 180 tablet 3    pioglitazone (Actos) 30 mg tablet Take 1 tablet (30 mg) by mouth once daily. 90 tablet 0    simvastatin (Zocor) 10 mg tablet Take 1 tablet (10 mg) by mouth once daily. 90 tablet 0    solifenacin (VESIcare) 5 mg tablet Take 1 tablet (5 mg) by mouth once daily. 90 tablet 0     No current facility-administered medications for this visit.          ROS:  The patient is awake and oriented. No dizziness " or lightheadedness. No chills and no fever. No headaches. No nausea and no vomiting. No shortness of breath. No cough. No sputum. No chest pain. No chest tightness. No abdominal pain. No diarrhea and no constipation. No hematemesis or hemoptysis. No hematuria. No rectal bleeding. No melena. No epistaxis. No urinary symptoms. No flank pain. No leg edema. No leg pain. No weakness. No itching. Overall, the rest of the review of systems is also negative.  12 point review of systems otherwise negative as stated in HPI.        Physical Examination:        Vitals:    05/17/24 1025   BP: 119/68   Pulse: 71     General: The patient is awake, oriented, and is not in any distress.  Head and Neck: Normocephalic. No periorbital edema.  Eyes: non-icteric  Respiratory: Symmetric air entry. Symmetric chest expansion.No respiratory distress.  Cardiovascular: Symmetric peripheral pulses.  Skin: No maculopapular rash.  Abdomen: soft, nt/nd  Musculoskeletal: No peripheral edema in both left and right upper extremities.  No edema in either left or right lower extremities.  Neuro Exam: Speech is fluent. Moves extremities.    Imaging:  === 04/29/24 ===    US RENAL COMPLETE    - Impression -  Nonvisualization of the right kidney.    No left hydronephrosis.    Left renal mid aspect 3.7 cm simple cyst.    No focal urinary bladder abnormality identified.    MACRO:  None.    Signed by: Kun Dhaliwal 4/30/2024 5:27 PM  Dictation workstation:   [id number]       Blood Labs:  No results found for this or any previous visit (from the past 24 hour(s)).   Lab Results   Component Value Date    PTH 37.0 04/29/2024    PROTUR NEGATIVE 03/12/2024    PHOS 3.4 08/10/2023      Lab Results   Component Value Date    GLUCOSE 96 04/29/2024    CALCIUM 9.1 04/29/2024     04/29/2024    K 4.6 04/29/2024    CO2 26 04/29/2024     04/29/2024    BUN 22 04/29/2024    CREATININE 1.33 (H) 04/29/2024         Assessment and Plan:  #1 chronic kidney disease  stage II-III.  Last creatinine was 1.33.  Stable kidney function.  He has a single kidney.  No major finding in kidney ultrasound.  Urine tests are not done. I reordered the tests.     2.  Hypertension.  Blood pressure is under control.  Continue the current medications.    3.  Diabetes.  I ordered a spot urine protein to creatinine ratio.    4.  Vitamin D deficiency.  I put him on ergocalciferol     I will see him in about 6 months for follow-up.          Dani Kothari MD  Senior Attending Physician  Director of Onco-Nephrology Program  Division of Nephrology & Hypertension  [facility]

## 2024-05-21 ENCOUNTER — TREATMENT (OUTPATIENT)
Dept: PHYSICAL THERAPY | Facility: CLINIC | Age: 69
End: 2024-05-21
Payer: MEDICARE

## 2024-05-21 DIAGNOSIS — M17.11 PRIMARY OSTEOARTHRITIS OF RIGHT KNEE: ICD-10-CM

## 2024-05-21 PROCEDURE — 97110 THERAPEUTIC EXERCISES: CPT | Mod: GP | Performed by: PHYSICAL THERAPIST

## 2024-05-21 ASSESSMENT — PAIN - FUNCTIONAL ASSESSMENT: PAIN_FUNCTIONAL_ASSESSMENT: 0-10

## 2024-05-21 ASSESSMENT — PAIN SCALES - GENERAL: PAINLEVEL_OUTOF10: 1

## 2024-05-21 NOTE — PROGRESS NOTES
PHYSICAL THERAPY TREATMENT    Patient name:  Dalton Champion    MRN:  [id number]    :  1955    Today's Date:  24    Time Calculation  Start Time: 1210  Stop Time: 1250  Time Calculation (min): 40 min     PT Therapeutic Procedures Time Entry  Therapeutic Exercise Time Entry: 38     Referral by:  Referred By: Dr. Steffen Frost    Diagnoses:  Diagnosis and Precautions: R knee OA, R Total Knee Arthroplasty 2024    Assessment/Plan:  Therapeutic exercise performed in order to improve R knee strength/ROM/mobility.  Patient requires increased tactile/verbal cues with exercise in order to reduce R knee stress/strain during the particular exercise performed.  Patient challenged with exercises performed in clinic secondary to R knee fatigue.   PT Assessment  PT Assessment Results: Decreased strength, Decreased range of motion, Decreased endurance, Impaired balance, Decreased mobility, Pain, Orthopedic restrictions  Rehab Prognosis: Good  Evaluation/Treatment Tolerance: Patient tolerated treatment well  OP PT Plan  PT Plan: Skilled PT  Rehab Potential: Good  Plan of Care Agreement: Patient    General Visit Information  PT  Visit  PT Received On: 24    General  Reason for Referral: PT Evaluate and Treat  Referred By: Dr. Steffen Frost  General Comment: R knee OA, R Total Knee Arthroplasty 2024    Insurance  Insurance reviewed  Name of Insurance:  Medicare  Visit No.  6/10  RIGHT TKR; 20% COINSUR / $240 DED / UNLIM V BMN / PA IS NOT REQ / KX MOD AFTER  VISIT;  4 LIFE IS SEC TO MEDICARE AND WILL COV PART B COINSUR AFTER DED IS MET [id number] // Belinda confirmed 24 9:17am     Subjective:  Patient reports that his R knee continues to feel better.    Precautions  STEADI Fall Risk Score (The score of 4 or more indicates an increased risk of falling): 0  Medical Precautions:  (diabetes, testicular cancer, OA, 1 kidney)  Post-Surgical Precautions: Right total knee precautions    Pain:  Pain  Assessment  Pain Assessment: 0-10  Pain Score: 1  Pain Location: Knee  Pain Orientation: Right    ROM  R knee flexion AROM 107 degrees before heel slides, 113 degrees after heel slides  R knee flexion AAROM 116 degrees    Treatments    Therapeutic Exercise  Therapeutic Exercise Performed: Yes  Therapeutic Exercise Activity 1: Lunge stretch on step x 20 reps  Therapeutic Exercise Activity 2: Mini-Squats x 30 reps  Therapeutic Exercise Activity 3: Step-ups forward/lateral step-ups 6 inch step, x 30 reps each  Therapeutic Exercise Activity 4: Recumbent bike x 7 minutes  Therapeutic Exercise Activity 5: Cybex Leg Press 3 plates, x 20 reps  Therapeutic Exercise Activity 6: supine heel slides x 30 reps  Therapeutic Exercise Activity 7: supine bridges x 30 reps    Treatment  Time in clinic started at:   12:10pm  Time in clinic ended at:   12:50pm  Total time in clinic is:    40 minutes  Total timed code time is:   38 minutes    Treatment Performed Today:.   Therapeutic Exercise x 3 units

## 2024-05-23 ENCOUNTER — OFFICE VISIT (OUTPATIENT)
Dept: ORTHOPEDIC SURGERY | Facility: CLINIC | Age: 69
End: 2024-05-23
Payer: MEDICARE

## 2024-05-23 DIAGNOSIS — G89.18 POST-OP PAIN: Primary | ICD-10-CM

## 2024-05-23 PROCEDURE — 3048F LDL-C <100 MG/DL: CPT | Performed by: STUDENT IN AN ORGANIZED HEALTH CARE EDUCATION/TRAINING PROGRAM

## 2024-05-23 PROCEDURE — 1159F MED LIST DOCD IN RCRD: CPT | Performed by: STUDENT IN AN ORGANIZED HEALTH CARE EDUCATION/TRAINING PROGRAM

## 2024-05-23 PROCEDURE — 3052F HG A1C>EQUAL 8.0%<EQUAL 9.0%: CPT | Performed by: STUDENT IN AN ORGANIZED HEALTH CARE EDUCATION/TRAINING PROGRAM

## 2024-05-23 PROCEDURE — 1157F ADVNC CARE PLAN IN RCRD: CPT | Performed by: STUDENT IN AN ORGANIZED HEALTH CARE EDUCATION/TRAINING PROGRAM

## 2024-05-23 PROCEDURE — 3061F NEG MICROALBUMINURIA REV: CPT | Performed by: STUDENT IN AN ORGANIZED HEALTH CARE EDUCATION/TRAINING PROGRAM

## 2024-05-23 PROCEDURE — 4010F ACE/ARB THERAPY RXD/TAKEN: CPT | Performed by: STUDENT IN AN ORGANIZED HEALTH CARE EDUCATION/TRAINING PROGRAM

## 2024-05-23 PROCEDURE — 3008F BODY MASS INDEX DOCD: CPT | Performed by: STUDENT IN AN ORGANIZED HEALTH CARE EDUCATION/TRAINING PROGRAM

## 2024-05-23 PROCEDURE — 99024 POSTOP FOLLOW-UP VISIT: CPT | Performed by: STUDENT IN AN ORGANIZED HEALTH CARE EDUCATION/TRAINING PROGRAM

## 2024-05-23 PROCEDURE — 1160F RVW MEDS BY RX/DR IN RCRD: CPT | Performed by: STUDENT IN AN ORGANIZED HEALTH CARE EDUCATION/TRAINING PROGRAM

## 2024-05-23 RX ORDER — NAPROXEN 500 MG/1
500 TABLET ORAL
Qty: 28 TABLET | Refills: 0 | Status: SHIPPED | OUTPATIENT
Start: 2024-05-23 | End: 2024-06-06

## 2024-05-23 NOTE — PROGRESS NOTES
History of Present Illness:  Patient returns today endorsing mild pain.  Patient notes improving functional status. He is working with PT, has a few more visits left.     Physical Examination:  Well healed incision   ROM: 0-110  No laxity to varus / valgus stress  + Plantar/dorsiflexion  Negative Harry test    Assessment:  Patient status post right total knee arthroplasty, doing well    Plan:  Discussed analgesics.  Reviewed range of motion, strength goals.   A nonsteroidal anti-inflammatory drug (NSAID) was prescribed.  We reviewed the risks (including gastric issues, renal issues) and benefits of the medication.  We discussed a scheduled course if no adverse reactions to help with swelling / pain.  We discussed that chronic usage should be checked with primary care physician.   He only has one kidney so we recommended NSAID Rx for only 2 weeks, also DM II and would advise against MDP.   Discussed activities to avoid  Dental prophylaxis discussed.  Follow-up:  2 months    Chava Willams PA-C

## 2024-05-24 ENCOUNTER — TREATMENT (OUTPATIENT)
Dept: PHYSICAL THERAPY | Facility: CLINIC | Age: 69
End: 2024-05-24
Payer: MEDICARE

## 2024-05-24 DIAGNOSIS — M17.11 PRIMARY OSTEOARTHRITIS OF RIGHT KNEE: ICD-10-CM

## 2024-05-24 PROCEDURE — 97110 THERAPEUTIC EXERCISES: CPT | Mod: GP,KX | Performed by: PHYSICAL THERAPIST

## 2024-05-24 ASSESSMENT — PAIN - FUNCTIONAL ASSESSMENT: PAIN_FUNCTIONAL_ASSESSMENT: 0-10

## 2024-05-24 ASSESSMENT — PAIN SCALES - GENERAL: PAINLEVEL_OUTOF10: 0 - NO PAIN

## 2024-05-24 NOTE — PROGRESS NOTES
PHYSICAL THERAPY TREATMENT    Patient name:  Dalton Champion    MRN:  [id number]    :  1955    Today's Date:  24    Time Calculation  Start Time: 915  Stop Time: 1000  Time Calculation (min): 45 min     PT Therapeutic Procedures Time Entry  Therapeutic Exercise Time Entry: 30     Referral by:  Referred By: Dr. Steffen Frost    Diagnoses:  Diagnosis and Precautions: R knee OA, R Total Knee Arthroplasty 2024    Assessment/Plan:  Therapeutic exercise performed in order to improve R knee strength/ROM/mobility.  Patient requires increased tactile/verbal cues with exercise in order to reduce R knee stress/strain during the particular exercise performed.  Patient challenged with exercises performed in clinic secondary to R knee fatigue.   PT Assessment  PT Assessment Results: Decreased strength, Decreased range of motion, Decreased endurance, Impaired balance, Decreased mobility, Pain, Orthopedic restrictions  Rehab Prognosis: Good  OP PT Plan  PT Plan: Skilled PT  Rehab Potential: Good  Plan of Care Agreement: Patient    General Visit Information  PT  Visit  PT Received On: 24    General  Reason for Referral: PT Evaluate and Treat  Referred By: Dr. Steffen Frost  General Comment: R knee OA, R Total Knee Arthroplasty 2024    Insurance  Insurance reviewed  Name of Insurance:  Medicare  Visit No.  7/10  RIGHT TKR; 20% COINSUR / $240 DED / UNLIM V BMN / PA IS NOT REQ / KX MOD AFTER  VISIT;  4 LIFE IS SEC TO MEDICARE AND WILL COV PART B COINSUR AFTER DED IS MET [id number] // Belinda confirmed 24 9:17am     Subjective:  Patient reports that his R knee continues to feel better.    Precautions  STEADI Fall Risk Score (The score of 4 or more indicates an increased risk of falling): 0  Medical Precautions:  (diabetes, testicular cancer, OA, 1 kidney)  Post-Surgical Precautions: Right total knee precautions    Pain:  Pain Assessment  Pain Assessment: 0-10  Pain Score: 0 - No pain  Pain Location:  Knee  Pain Orientation: Right    ROM  R knee flexion AROM 108 degrees before heel slides, 115 degrees after heel slides  R knee flexion AAROM 115 degrees  R knee flexion PROM 128 degrees    Treatments    Therapeutic Exercise  Therapeutic Exercise Performed: Yes  Therapeutic Exercise Activity 1: Lunge stretch on step x 20 reps  Therapeutic Exercise Activity 2: Mini-Squats x 30 reps  Therapeutic Exercise Activity 3: Step-ups forward/lateral step-ups 6 inch step, x 30 reps each  Therapeutic Exercise Activity 4: Recumbent bike x 7 minutes  Therapeutic Exercise Activity 5: Cybex Leg Press 3 plates, x 30 reps  Therapeutic Exercise Activity 6: stool scoots x 3 laps  Therapeutic Exercise Activity 7: Cable column TKE's 3 sets, 10 reps, 5 plates  Therapeutic Exercise Activity 8: supine bridges x 30 reps  Therapeutic Exercise Activity 9: supine heel slides x 30 reps    Treatment  Time in clinic started at:   9:15am  Time in clinic ended at:   10am  Total time in clinic is:     45 minutes  Total timed code time is:   30 minutes    Treatment Performed Today:.   No charge from 9:15am-9:30am  Therapeutic Exercise x 2 units

## 2024-05-28 ENCOUNTER — TREATMENT (OUTPATIENT)
Dept: PHYSICAL THERAPY | Facility: CLINIC | Age: 69
End: 2024-05-28
Payer: MEDICARE

## 2024-05-28 ENCOUNTER — HOSPITAL ENCOUNTER (OUTPATIENT)
Dept: PAIN MEDICINE | Facility: CLINIC | Age: 69
Discharge: HOME | End: 2024-05-28
Payer: MEDICARE

## 2024-05-28 ENCOUNTER — HOSPITAL ENCOUNTER (OUTPATIENT)
Dept: RADIOLOGY | Facility: HOSPITAL | Age: 69
Discharge: HOME | End: 2024-05-28
Payer: MEDICARE

## 2024-05-28 VITALS
HEART RATE: 74 BPM | RESPIRATION RATE: 20 BRPM | OXYGEN SATURATION: 97 % | DIASTOLIC BLOOD PRESSURE: 77 MMHG | SYSTOLIC BLOOD PRESSURE: 171 MMHG | TEMPERATURE: 95.4 F

## 2024-05-28 DIAGNOSIS — M17.11 PRIMARY OSTEOARTHRITIS OF RIGHT KNEE: ICD-10-CM

## 2024-05-28 DIAGNOSIS — M54.12 RADICULOPATHY, CERVICAL: ICD-10-CM

## 2024-05-28 PROCEDURE — 97110 THERAPEUTIC EXERCISES: CPT | Mod: GP,CQ,KX

## 2024-05-28 PROCEDURE — A4649 SURGICAL SUPPLIES: HCPCS | Performed by: PAIN MEDICINE

## 2024-05-28 PROCEDURE — 7100000010 HC PHASE TWO TIME - EACH INCREMENTAL 1 MINUTE: Performed by: PAIN MEDICINE

## 2024-05-28 PROCEDURE — 62321 NJX INTERLAMINAR CRV/THRC: CPT | Performed by: PAIN MEDICINE

## 2024-05-28 PROCEDURE — 7100000009 HC PHASE TWO TIME - INITIAL BASE CHARGE: Performed by: PAIN MEDICINE

## 2024-05-28 PROCEDURE — 96372 THER/PROPH/DIAG INJ SC/IM: CPT | Performed by: PAIN MEDICINE

## 2024-05-28 PROCEDURE — 2500000004 HC RX 250 GENERAL PHARMACY W/ HCPCS (ALT 636 FOR OP/ED): Performed by: PAIN MEDICINE

## 2024-05-28 PROCEDURE — 2550000001 HC RX 255 CONTRASTS: Performed by: PAIN MEDICINE

## 2024-05-28 RX ORDER — METHYLPREDNISOLONE ACETATE 40 MG/ML
80 INJECTION, SUSPENSION INTRA-ARTICULAR; INTRALESIONAL; INTRAMUSCULAR; SOFT TISSUE ONCE
Status: COMPLETED | OUTPATIENT
Start: 2024-05-28 | End: 2024-05-28

## 2024-05-28 RX ADMIN — IOHEXOL 10 ML: 300 INJECTION, SOLUTION INTRAVENOUS at 09:43

## 2024-05-28 RX ADMIN — METHYLPREDNISOLONE ACETATE 80 MG: 40 INJECTION, SUSPENSION INTRA-ARTICULAR; INTRALESIONAL; INTRAMUSCULAR; INTRASYNOVIAL; SOFT TISSUE at 09:42

## 2024-05-28 ASSESSMENT — PAIN - FUNCTIONAL ASSESSMENT
PAIN_FUNCTIONAL_ASSESSMENT: 0-10

## 2024-05-28 ASSESSMENT — PAIN DESCRIPTION - DESCRIPTORS: DESCRIPTORS: SHARP

## 2024-05-28 ASSESSMENT — PAIN SCALES - GENERAL
PAINLEVEL_OUTOF10: 0 - NO PAIN
PAINLEVEL_OUTOF10: 6

## 2024-05-28 NOTE — DISCHARGE INSTRUCTIONS
Post-injection instructions:    Your pain may not be gone immediately after the procedure--it usually takes the steroid 3-5 days to start working.   It may take several weeks for the medicine to reach its' full effect.   Pay attention to how much pain relief (what percentage compared to before the procedure) you get and for how long it lasts.     Activity: Avoid strenuous activity for 24 hours. After that return to your normal activity level.     Bandages: Remove after 24 hours     Showering/Bathing: You may shower after bandage is removed     Follow up: CALL OFFICE IN 7 DAYS [phone] LEAVE MESSAGE ABOUT THE RELIEF THAT WAS OBTAINED      Call the doctor immediately: if you notice:     Excessive bleeding from procedure site (brisk bright red bleeding from the site or bleeding that soaks the bandages or does not stop)   Severe headache  Inability to walk, leg or arm weakness or numbness that is worse after the procedure   Uncontrolled pain   New urinary or fecal incontinence   Signs of infection: Fever above 101.5F, redness, swelling, pus or drainage from the site    Epidural Injection    Why is this procedure done?  With an epidural injection, the doctor injects drugs deep into the area around your spinal cord. This is different than epidural anesthesia that is used for surgery or when a woman has a baby. Your spine is a group of bones in your back that protect the nerves in your spinal cord. Problems with your spine can cause swollen nerves in the spinal cord. This swelling leads to pain and can limit movement. In an epidural injection, the doctor may give you a drug to help with swelling and pain.  You may have an epidural injection in different parts of your back, based on where your pain is. For pain in your head or arms, you may have a cervical epidural injection. If your pain is in your upper or middle back, you may get a thoracic epidural injection. For pain in your lower back or legs, you may get a  lumbar epidural injection.    What will the results be?  The treatment may:  Lower pain  Reduce swelling in the nerves  Improve movement  What happens before the procedure?  Your doctor will take your history. Talk to the doctor about:  All the drugs you are taking. Be sure to include all prescription and over-the-counter (OTC) drugs, and herbal supplements. Tell the doctor about any drug allergy. Bring a list of drugs you take with you.  If you have high blood sugar or diabetes. Your drugs may need to be changed.  Any bleeding problems. Be sure to tell your doctor if you are taking any drugs that may cause bleeding. Some of these are warfarin, rivaroxaban, apixaban, ticagrelor, clopidogrel, ketorolac, ibuprofen, naproxen, or aspirin. Certain vitamins and herbs, such as garlic and fish oil, may also add to the risk for bleeding. You may need to stop these drugs as well. Talk to your doctor about them.  Tell the doctor if you are pregnant.  You will not be allowed to drive right away after the procedure. Ask a family member or a friend to drive you home.  What happens during the procedure?  To help the doctor make sure the drugs are being injected in the right place, your doctor may do an x-ray of your spine. Other times your doctor may do a continuous x-ray during the procedure. This is a fluoroscopy. The doctor may also use a colored dye or a contrast dye to check where to inject the drug.  You may be given a drug to help you relax. You may be given a drug to make the area of the injection numb.  The doctor will clean the skin on your back or neck. This helps prevent infection.  The doctor will put a needle through the skin toward your spine. The drug will be injected into a space near the spine.  The needle will be taken out and a bandage will be placed over the injection site.  What happens after the procedure?  Staff will check on you to make sure you are doing well. They will tell you when you can go  home.  What care is needed at home?  Relax on the day of the injection.  Do not drive or run machines for at least 12 hours afterwards.  Apply ice to the injection site. Place an ice pack or a bag of frozen peas wrapped in a towel over the painful part. Never put ice right on the skin. Do not leave the ice on more than 10 to 15 minutes at a time.  It may take a few days before you will feel the effects of the injection.  What follow-up care is needed?  Your doctor may ask you to make visits to the office to check on your progress. Be sure to keep these visits. You may also need to see a physical therapist (PT). The PT will teach you exercises to help you get back your strength and motion. Ask your doctor when you can exercise.  What problems could happen?  Bleeding  Infection (rare)  Headache  Nerve injury  If you have diabetes, your blood sugar can go up after the injection. Check with your doctor if you need more treatment for this.  Last Reviewed Date

## 2024-05-28 NOTE — PROGRESS NOTES
PHYSICAL THERAPY TREATMENT    Patient name:  Dalton Champion    MRN:  [id number]    :  1955    Today's Date:  24    Time Calculation  Start Time: 232  Stop Time: 317  Time Calculation (min): 45 min     PT Therapeutic Procedures Time Entry  Therapeutic Exercise Time Entry: 45     Referral by:  Referred By: Dr. Steffen Frost    Diagnoses:   Diagnosis and Precautions: R knee OA, R Total Knee Arthroplasty 2024     Assessment/Plan:  Therapeutic exercise performed in order to improve R knee strength/ROM/mobility.  Patient requires increased tactile/verbal cues with exercise in order to reduce R knee stress/strain during the particular exercise performed.  Patient challenged with exercises performed in clinic secondary to R knee fatigue but had no complaints of increased pain.       OP PT Plan  PT Plan: Skilled PT    General Visit Information       General  Reason for Referral: PT Evaluate and Treat  Referred By: Dr. Steffen Frost  General Comment: R knee OA, R Total Knee Arthroplasty 2024    Insurance  Insurance reviewed  Name of Insurance:  Medicare  Visit No.  8/10  RIGHT TKR; 20% COINSUR / $240 DED / UNLIM V BMN / PA IS NOT REQ / KX MOD AFTER  VISIT;  4 LIFE IS SEC TO MEDICARE AND WILL COV PART B COINSUR AFTER DED IS MET [id number] // Belinda confirmed 24 9:17am     Subjective:    Pt denies current right knee pain today. Pt states that he saw pain management today and had injection in his neck. He states that he told MD that he had PT appointment today and MD did not tell him to hold PT.  Pt denies any symptoms of dizziness, lightheadedness, etc. Consulted GD, PT and he states ok to continue with PT today.     Precautions  STEADI Fall Risk Score (The score of 4 or more indicates an increased risk of falling): 0  Medical Precautions:  (diabetes, testicular cancer, OA, 1 kidney)  Post-Surgical Precautions: Right total knee precautions    Pain:  Pain Assessment  Pain Assessment: 0-10  Pain  Score: 0 - No pain  Pain Location: Knee  Pain Orientation: Right    ROM  R knee flexion AROM 110 degrees before heel slides  R knee flexion AAROM  117 degrees after heel slides      Treatments       Lunge stretch on step x 20 reps  Mini-Squats 2 x 10 reps  Step-ups forward/lateral step-ups 6 inch step, x 20 reps each  Recumbent bike x 7 minutes  Cybex Leg Press 3 plates, 30 reps NT  stool scoots x 3 laps NT  Cable column TKE's 3 sets, 10 reps, 4 plates  supine bridges x 20 reps  supine heel slides x 30 reps  Hamstring stretch 30 sec x 3   LAQ with eccentric focus 3# 2 x 10  Seated hamstring curl BTB 2 x 10       Treatment  Time in clinic started at:  2:32 am  Time in clinic ended at:  3:17 am  Total time in clinic is:  45 minutes  Total timed code time is:   45 minutes    Treatment Performed Today:.     Therapeutic Exercise x 3 units

## 2024-05-28 NOTE — OP NOTE
Level performed  C7-T1    Operation  Cervical epidural steroid injection.        Surgical indications  The patient is here today to receive a cervical epidural steroid injection to assist with pain.    Operative report  The patient was taken to the procedure room, was placed in a prone positioning. The neck area was prepped and draped sterilely in the normal fashion under fluoroscopic guidance. The above-mentioned space was identified. The skin and subcutaneous tissue was anesthetized 1% lidocaine. An 18-gauge Tuohy needle was introduced using the normal saline loss-of-resistance technique. Once the loss of resistance had been achieved, the final positioning of the needle was confirmed with negative aspiration of heme and CSF, and with contrast material injection. Then, the patient received 80 mg of Depo-Medrol diluted into normal saline preservative-free making a total volume of 5 mL. The patient tolerated the procedure well, was taken to the recovery room, allowed to recover for sufficient amount of time and then was discharged home in stable condition. The patient was advised to [facility] to reassess the improvement and determine the need for repeating injection on an as-needed basis.    Thank you for allowing us to participate in the care of this patient.      Donta Mckeon MD  Medical director of [facility]   9:45 AM  05/28/24

## 2024-05-31 ENCOUNTER — TREATMENT (OUTPATIENT)
Dept: PHYSICAL THERAPY | Facility: CLINIC | Age: 69
End: 2024-05-31
Payer: MEDICARE

## 2024-05-31 DIAGNOSIS — M17.11 PRIMARY OSTEOARTHRITIS OF RIGHT KNEE: ICD-10-CM

## 2024-05-31 PROCEDURE — 97110 THERAPEUTIC EXERCISES: CPT | Mod: GP | Performed by: PHYSICAL THERAPIST

## 2024-05-31 ASSESSMENT — PAIN - FUNCTIONAL ASSESSMENT: PAIN_FUNCTIONAL_ASSESSMENT: 0-10

## 2024-05-31 ASSESSMENT — PAIN SCALES - GENERAL: PAINLEVEL_OUTOF10: 0 - NO PAIN

## 2024-05-31 NOTE — PROGRESS NOTES
PHYSICAL THERAPY TREATMENT    Patient name:  Dalton Champion    MRN:  [id number]    :  1955    Today's Date:  24    Time Calculation  Start Time: 45  Stop Time: 5  Time Calculation (min): 40 min     PT Therapeutic Procedures Time Entry  Therapeutic Exercise Time Entry: 38     Referral by:  Referred By: Dr. Steffen Frost    Diagnoses:  Diagnosis and Precautions: R knee OA, R Total Knee Arthroplasty 2024    Goals:   By the end of 10 visits patient will be able to do the following with < 0/10 R KNEE pain:    HEP:  Patient will consistently perform HIS home exercise program for 20-30 minute sessions, 1-2x/day, 3-4 days/week independently by the end of 10 visits.  MET    Basic ADL's:   Patient will perform bADL's/instrumental ADL's for 30 minutes moving between various closed kinetic chain postures.    ROM and Strength:  Patient will demonstrate 5/5 R KNEE strength in all planes and 0-120 degrees R KNEE AROM in all planes to improve their ability to lift, stand, ambulate and perform basic ADL's.  Nearly met    Stair Negotiation:  Patient will be able to ambulate up/down stairs for 1-2 flights at a time.  MET    Gait/Locomotion:  Patient will be able to ambulate for 30-60 minutes at a time.  MET  Minimal to no gait deviation across level ground/stairs.  Patient will demonstrate no R KNEE pain with the following movements:  partial squat, lunge, forward/lateral step-up, HR, stepdown and SLS.    Sleep:  Patient will sleep thru the night 4/7 nights/week.  MET    Participation restrictions:  Increase LEFS to > or = to 55/80 for increased functional ability.  MET    Pain:  Decrease pain at worst to < or = to 0/10 for improved QOL and ability to sleep.  MET    Assessment/Plan:  Patient comes into clinic today for PT Re-Evaluation secondary to being 8.5 weeks s/p R Total Knee Arthroplasty 2024 by Dr. Steffen Frost.  Patient demonstrates very good progress with regards to his R knee rehab thus far.  Patient  has met nearly all of his PT goals, is independent with his HEP and will be discharged.  Therapeutic exercise performed in order to improve R knee strength/ROM/mobility.  Patient requires increased tactile/verbal cues with exercise in order to reduce R knee stress/strain during the particular exercise performed.  Patient challenged with exercises performed in clinic secondary to R knee fatigue.   PT Assessment  PT Assessment Results:  (See note)  Rehab Prognosis: Good  Evaluation/Treatment Tolerance: Patient tolerated treatment well  OP PT Plan  PT Plan: No Additional PT interventions required at this time  PT Frequency:  (DC)  Duration: DC  Rehab Potential: Good  Plan of Care Agreement: Patient    General Visit Information  PT  Visit  PT Received On: 05/31/24    General  Reason for Referral: PT Evaluate and Treat  Referred By: Dr. Steffen Frost  General Comment: R knee OA, R Total Knee Arthroplasty 4/1/2024    Insurance  Insurance reviewed  Name of Insurance:  Medicare  Visit No.  9/10  RIGHT TKR; 20% COINSUR / $240 DED / UNLIM V BMN / PA IS NOT REQ / KX MOD AFTER 20TH VISIT;  4 LIFE IS SEC TO MEDICARE AND WILL COV PART B COINSUR AFTER DED IS MET [id number] // Belinda confirmed 4/19/24 9:17am     Subjective:  Patient comes into clinic today for PT Re-Evaluation secondary to being 8.5 weeks s/p R Total Knee Arthroplasty 4/1/2024 by Dr. Steffen Frost.  Patient to follow-up with MD in July 2024.  Patient reports that his R knee feels 95% improved overall.    Precautions  STEADI Fall Risk Score (The score of 4 or more indicates an increased risk of falling): 0  Medical Precautions:  (diabetes, testicular cancer, OA, 1 kidney)  Post-Surgical Precautions: Right total knee precautions    Pain:  Pain Assessment  Pain Assessment: 0-10  Pain Score: 0 - No pain  Pain Location: Knee  Pain Orientation: Right    Objective:  Restrictions as per MD's Protocol if surgical:  see protocol above    Weightbearing Status:  WBAT R  LE    Skin:  R knee surgical TKA incision healing and closed, no active signs of infection    Palpation:   no point tenderness over the R knee    Sensation:  Patient denies numbness/tingling of bilateral LOWER extremities.    Gait:  normal gait R LE    ROM:  AROM  R knee flexion/extension -1 degree-112 degrees, R knee flexion PROM 122 degrees    Strength:    R knee 5/5 all planes, R hip flexion 5/5    Outcome measure  Lower Extremity Funtional Score (LEFS): 63/80  KOOS Score 79.2    Treatments    Therapeutic Exercise  Therapeutic Exercise Performed: Yes  Therapeutic Exercise Activity 1: Lunge stretch on step x 20 reps  Therapeutic Exercise Activity 2: Mini-Squats x 30 reps  Therapeutic Exercise Activity 3: Step-ups forward/lateral step-ups 8 inch step, x 30 reps each  Therapeutic Exercise Activity 4: Recumbent bike x 7 minutes, resistance 10.0  Therapeutic Exercise Activity 5: See Re-Evaluation    Treatment  Time in clinic started at:   9:45am  Time in clinic ended at:   10:25am  Total time in clinic is:      40 minutes  Total timed code time is:    38 minutes    Treatment Performed Today:.   Therapeutic Exercise x 3 units

## 2024-06-04 ENCOUNTER — APPOINTMENT (OUTPATIENT)
Dept: PHYSICAL THERAPY | Facility: CLINIC | Age: 69
End: 2024-06-04
Payer: MEDICARE

## 2024-06-07 DIAGNOSIS — E11.9 DIABETES MELLITUS TYPE 2, INSULIN DEPENDENT (MULTI): ICD-10-CM

## 2024-06-07 DIAGNOSIS — E11.9 TYPE 2 DIABETES MELLITUS WITHOUT COMPLICATION, WITH LONG-TERM CURRENT USE OF INSULIN (MULTI): ICD-10-CM

## 2024-06-07 DIAGNOSIS — Z79.4 TYPE 2 DIABETES MELLITUS WITHOUT COMPLICATION, WITH LONG-TERM CURRENT USE OF INSULIN (MULTI): ICD-10-CM

## 2024-06-07 DIAGNOSIS — Z79.4 DIABETES MELLITUS TYPE 2, INSULIN DEPENDENT (MULTI): ICD-10-CM

## 2024-06-10 RX ORDER — PIOGLITAZONEHYDROCHLORIDE 30 MG/1
30 TABLET ORAL DAILY
Qty: 90 TABLET | Refills: 0 | Status: SHIPPED | OUTPATIENT
Start: 2024-06-10

## 2024-06-26 DIAGNOSIS — K21.9 GASTROESOPHAGEAL REFLUX DISEASE, UNSPECIFIED WHETHER ESOPHAGITIS PRESENT: ICD-10-CM

## 2024-06-26 RX ORDER — CIMETIDINE 800 MG/1
800 TABLET, FILM COATED ORAL NIGHTLY
Qty: 90 TABLET | Refills: 1 | Status: SHIPPED | OUTPATIENT
Start: 2024-06-26

## 2024-07-11 ENCOUNTER — APPOINTMENT (OUTPATIENT)
Dept: ORTHOPEDIC SURGERY | Facility: CLINIC | Age: 69
End: 2024-07-11
Payer: MEDICARE

## 2024-07-11 DIAGNOSIS — M25.569 KNEE PAIN, UNSPECIFIED CHRONICITY, UNSPECIFIED LATERALITY: ICD-10-CM

## 2024-07-11 PROCEDURE — 3052F HG A1C>EQUAL 8.0%<EQUAL 9.0%: CPT | Performed by: ORTHOPAEDIC SURGERY

## 2024-07-11 PROCEDURE — 1157F ADVNC CARE PLAN IN RCRD: CPT | Performed by: ORTHOPAEDIC SURGERY

## 2024-07-11 PROCEDURE — 1036F TOBACCO NON-USER: CPT | Performed by: ORTHOPAEDIC SURGERY

## 2024-07-11 PROCEDURE — 3048F LDL-C <100 MG/DL: CPT | Performed by: ORTHOPAEDIC SURGERY

## 2024-07-11 PROCEDURE — 1159F MED LIST DOCD IN RCRD: CPT | Performed by: ORTHOPAEDIC SURGERY

## 2024-07-11 PROCEDURE — 4010F ACE/ARB THERAPY RXD/TAKEN: CPT | Performed by: ORTHOPAEDIC SURGERY

## 2024-07-11 PROCEDURE — 3008F BODY MASS INDEX DOCD: CPT | Performed by: ORTHOPAEDIC SURGERY

## 2024-07-11 PROCEDURE — 99213 OFFICE O/P EST LOW 20 MIN: CPT | Performed by: ORTHOPAEDIC SURGERY

## 2024-07-11 PROCEDURE — 3061F NEG MICROALBUMINURIA REV: CPT | Performed by: ORTHOPAEDIC SURGERY

## 2024-07-11 NOTE — PROGRESS NOTES
History of Present Illness:  Patient returns today endorsing minimal pain.  Patient notes improving functional status.    Physical Examination:  Well healed incision   Mild effusion   ROM: full   No laxity to varus / valgus stress  + Plantar/dorsiflexion  Negative Harry test    Assessment:  Patient status post right total knee arthroplasty, doing well    Plan:  Discussed improvement in strength, swelling over the course of the first year post op.  Discussed return to activities and activities to avoid.  Dental prophylaxis discussed.  Follow-up:  Not needed at this time

## 2024-07-13 DIAGNOSIS — G47.00 INSOMNIA, UNSPECIFIED TYPE: ICD-10-CM

## 2024-07-16 RX ORDER — AMITRIPTYLINE HYDROCHLORIDE 25 MG/1
25 TABLET, FILM COATED ORAL NIGHTLY
Qty: 90 TABLET | Refills: 1 | Status: SHIPPED | OUTPATIENT
Start: 2024-07-16

## 2024-07-22 DIAGNOSIS — E11.9 TYPE 2 DIABETES MELLITUS WITHOUT COMPLICATION, WITHOUT LONG-TERM CURRENT USE OF INSULIN (MULTI): ICD-10-CM

## 2024-07-22 DIAGNOSIS — E78.5 HYPERLIPIDEMIA, UNSPECIFIED HYPERLIPIDEMIA TYPE: ICD-10-CM

## 2024-07-23 RX ORDER — SIMVASTATIN 10 MG/1
10 TABLET, FILM COATED ORAL DAILY
Qty: 90 TABLET | Refills: 3 | Status: SHIPPED | OUTPATIENT
Start: 2024-07-23

## 2024-07-23 RX ORDER — SOLIFENACIN SUCCINATE 5 MG/1
5 TABLET, FILM COATED ORAL DAILY
Qty: 90 TABLET | Refills: 3 | Status: SHIPPED | OUTPATIENT
Start: 2024-07-23

## 2024-07-26 DIAGNOSIS — Z79.4 TYPE 2 DIABETES MELLITUS WITHOUT COMPLICATION, WITH LONG-TERM CURRENT USE OF INSULIN (MULTI): ICD-10-CM

## 2024-07-26 DIAGNOSIS — E11.9 TYPE 2 DIABETES MELLITUS WITHOUT COMPLICATION, WITH LONG-TERM CURRENT USE OF INSULIN (MULTI): ICD-10-CM

## 2024-07-26 RX ORDER — INSULIN LISPRO 100 [IU]/ML
INJECTION, SUSPENSION SUBCUTANEOUS
Qty: 75 ML | Refills: 0 | Status: SHIPPED | OUTPATIENT
Start: 2024-07-26

## 2024-07-29 ENCOUNTER — APPOINTMENT (OUTPATIENT)
Dept: PRIMARY CARE | Facility: CLINIC | Age: 69
End: 2024-07-29
Payer: MEDICARE

## 2024-08-26 ENCOUNTER — TELEPHONE (OUTPATIENT)
Dept: ENDOCRINOLOGY | Facility: CLINIC | Age: 69
End: 2024-08-26
Payer: MEDICARE

## 2024-08-26 DIAGNOSIS — E11.9 TYPE 2 DIABETES MELLITUS WITHOUT COMPLICATION, WITHOUT LONG-TERM CURRENT USE OF INSULIN (MULTI): ICD-10-CM

## 2024-08-26 RX ORDER — PEN NEEDLE, DIABETIC 30 GX3/16"
NEEDLE, DISPOSABLE MISCELLANEOUS
Qty: 200 EACH | Refills: 3 | Status: SHIPPED | OUTPATIENT
Start: 2024-08-26

## 2024-08-26 NOTE — TELEPHONE ENCOUNTER
Would you please order my needles for me from [facility].   They're called BD Vandana Ultra-Fine Pen Needles and the prescription number is [id number]

## 2024-09-12 ENCOUNTER — OFFICE VISIT (OUTPATIENT)
Dept: PAIN MEDICINE | Facility: CLINIC | Age: 69
End: 2024-09-12
Payer: MEDICARE

## 2024-09-12 VITALS
TEMPERATURE: 98.2 F | DIASTOLIC BLOOD PRESSURE: 74 MMHG | SYSTOLIC BLOOD PRESSURE: 175 MMHG | HEART RATE: 83 BPM | OXYGEN SATURATION: 97 %

## 2024-09-12 DIAGNOSIS — M54.12 CERVICAL RADICULOPATHY: Primary | ICD-10-CM

## 2024-09-12 DIAGNOSIS — G89.29 NECK PAIN, CHRONIC: ICD-10-CM

## 2024-09-12 DIAGNOSIS — M54.2 NECK PAIN, CHRONIC: ICD-10-CM

## 2024-09-12 PROCEDURE — 99212 OFFICE O/P EST SF 10 MIN: CPT | Performed by: NURSE PRACTITIONER

## 2024-09-12 ASSESSMENT — ENCOUNTER SYMPTOMS
LOSS OF SENSATION IN FEET: 0
OCCASIONAL FEELINGS OF UNSTEADINESS: 0

## 2024-09-12 ASSESSMENT — PAIN SCALES - GENERAL: PAINLEVEL_OUTOF10: 2

## 2024-09-12 ASSESSMENT — PATIENT HEALTH QUESTIONNAIRE - PHQ9
SUM OF ALL RESPONSES TO PHQ9 QUESTIONS 1 AND 2: 0
2. FEELING DOWN, DEPRESSED OR HOPELESS: NOT AT ALL
1. LITTLE INTEREST OR PLEASURE IN DOING THINGS: NOT AT ALL

## 2024-09-12 ASSESSMENT — PAIN DESCRIPTION - DESCRIPTORS: DESCRIPTORS: SHARP;SHOOTING

## 2024-09-12 ASSESSMENT — COLUMBIA-SUICIDE SEVERITY RATING SCALE - C-SSRS
2. HAVE YOU ACTUALLY HAD ANY THOUGHTS OF KILLING YOURSELF?: NO
1. IN THE PAST MONTH, HAVE YOU WISHED YOU WERE DEAD OR WISHED YOU COULD GO TO SLEEP AND NOT WAKE UP?: NO
6. HAVE YOU EVER DONE ANYTHING, STARTED TO DO ANYTHING, OR PREPARED TO DO ANYTHING TO END YOUR LIFE?: NO

## 2024-09-12 ASSESSMENT — PAIN - FUNCTIONAL ASSESSMENT: PAIN_FUNCTIONAL_ASSESSMENT: 0-10

## 2024-09-13 ENCOUNTER — APPOINTMENT (OUTPATIENT)
Dept: PAIN MEDICINE | Facility: CLINIC | Age: 69
End: 2024-09-13
Payer: MEDICARE

## 2024-09-13 NOTE — H&P
History Of Present Illness  Dalton Champion is a 69 y.o. male presenting for follow up regarding the neck pain. He is known in this clinic because of chronic neck pain. He takes occasional Tylenol, Alleve but they offer very minimal relief.  He had cervical epidural steroid injection C7-T1 numerous times, the last time he had it was on 5/28/2024, he reports that he had about 80 to 90% relief from pain and lasted for over 3 months. He states that his pain in the neck is coming back. At this time his level of pain is about 2 to 3/10 while he is sitting. He states that the pain comes back once he gets up and moves around, and with exertion, pain goes up to about 7 to 8/10, pain is mostly in his neck and shoulders. OARRS obtained and reviewed, no abuse or misuse with prescribed medication noted.  He is still able to perform activities of daily living independently.  He works occasionally.     Past Medical History  Past Medical History:   Diagnosis Date    Abdominal cramping 04/24/2023    Arthritis     Basal cell carcinoma (BCC)     Chronic pain disorder     Congenital absence of right kidney     GERD (gastroesophageal reflux disease)     Hyperlipidemia     Knee pain 04/24/2023    Lung nodules     Personal history of other endocrine, nutritional and metabolic disease     History of type 2 diabetes mellitus    Testicular cancer (Multi)        Surgical History  Past Surgical History:   Procedure Laterality Date    COLONOSCOPY      ORCHIECTOMY Right     TOTAL KNEE ARTHROPLASTY Right 04/01/2024    WISDOM TOOTH EXTRACTION          Social History  He reports that he quit smoking about 34 years ago. His smoking use included cigarettes. He started smoking about 56 years ago. He has a 44 pack-year smoking history. He has never used smokeless tobacco. He reports that he does not drink alcohol and does not use drugs.    Family History  Family History   Problem Relation Name Age of Onset    No Known Problems Mother      Diabetes  Father      Diabetes Brother          Allergies  Iodinated contrast media and Iodine    Review of Systems    Review of systems x 10 is negative.   No recent injury or falls reported.   No recent change in medical condition reported.   No recent weakness reported.   Still able to control bowel and bladder function.  Denies any problem with constipation.   Denies fever, cough, shortness of breath recently.   No interval change with medication/health issues reported.  Denies opioids diversion and abuse. Denies overuse of pain medications.    Physical Exam   Awake,alert, no acute distress, appropriate.  Spine is of normal curvature.  Full ROM on all 4 extremities, sensation and motor intact, no vascular compromise.  No pedal edema, normal gait.  Skin warm, dry, intact, turgor is normal.  Intermittent neck pain that radiates to both shoulders.    Last Recorded Vitals  Blood pressure 175/74, pulse 83, temperature 36.8 °C (98.2 °F), temperature source Temporal, SpO2 97%.    Relevant Results  No recent imaging for cervical region noted.       Assessment/Plan     Discussed about repeating the cervical epidural steroid injection to help with the neck pain.  Schedule for cervical epidural steroid injection C7-T1 under fluoroscopic guidance,  To assist with the neck pain. His Oswestry score today is 6, mild disability level.  Explained plan to this patient, and patient verbalized understanding and agreement with the plan. If there is questions or concerns, please feel free to contact me to clarify at [phone], M-F 8-4 PM.    Chronic neck pain associated with cervicalgia, cervical radiculopathy, that failed  Conservative management of physical therapy and use of nonsteroidal antiinflammatory medications.       I spent 20 minutes in the professional and overall care of this patient.      Pita Castellano, APRN-CNP

## 2024-09-19 ENCOUNTER — HOSPITAL ENCOUNTER (OUTPATIENT)
Dept: PAIN MEDICINE | Facility: CLINIC | Age: 69
Discharge: HOME | End: 2024-09-19
Payer: MEDICARE

## 2024-09-19 VITALS
TEMPERATURE: 97.6 F | SYSTOLIC BLOOD PRESSURE: 154 MMHG | DIASTOLIC BLOOD PRESSURE: 76 MMHG | RESPIRATION RATE: 18 BRPM | HEART RATE: 68 BPM | OXYGEN SATURATION: 98 %

## 2024-09-19 DIAGNOSIS — M54.2 NECK PAIN, CHRONIC: ICD-10-CM

## 2024-09-19 DIAGNOSIS — M54.12 CERVICAL RADICULOPATHY: ICD-10-CM

## 2024-09-19 DIAGNOSIS — G89.29 NECK PAIN, CHRONIC: ICD-10-CM

## 2024-09-19 PROCEDURE — 2500000005 HC RX 250 GENERAL PHARMACY W/O HCPCS: Performed by: PAIN MEDICINE

## 2024-09-19 PROCEDURE — 2550000001 HC RX 255 CONTRASTS: Performed by: PAIN MEDICINE

## 2024-09-19 PROCEDURE — 62321 NJX INTERLAMINAR CRV/THRC: CPT | Performed by: PAIN MEDICINE

## 2024-09-19 PROCEDURE — 2500000004 HC RX 250 GENERAL PHARMACY W/ HCPCS (ALT 636 FOR OP/ED): Performed by: PAIN MEDICINE

## 2024-09-19 RX ORDER — LIDOCAINE HYDROCHLORIDE 10 MG/ML
INJECTION, SOLUTION EPIDURAL; INFILTRATION; INTRACAUDAL; PERINEURAL AS NEEDED
Status: COMPLETED | OUTPATIENT
Start: 2024-09-19 | End: 2024-09-19

## 2024-09-19 RX ORDER — BUPIVACAINE HYDROCHLORIDE 2.5 MG/ML
INJECTION, SOLUTION EPIDURAL; INFILTRATION; INTRACAUDAL AS NEEDED
Status: COMPLETED | OUTPATIENT
Start: 2024-09-19 | End: 2024-09-19

## 2024-09-19 RX ORDER — METHYLPREDNISOLONE ACETATE 80 MG/ML
INJECTION, SUSPENSION INTRA-ARTICULAR; INTRALESIONAL; INTRAMUSCULAR; SOFT TISSUE AS NEEDED
Status: COMPLETED | OUTPATIENT
Start: 2024-09-19 | End: 2024-09-19

## 2024-09-19 ASSESSMENT — PAIN - FUNCTIONAL ASSESSMENT: PAIN_FUNCTIONAL_ASSESSMENT: 0-10

## 2024-09-19 ASSESSMENT — PAIN SCALES - GENERAL: PAINLEVEL_OUTOF10: 2

## 2024-09-19 NOTE — DISCHARGE INSTRUCTIONS
Post-injection instructions:    Your pain may not be gone immediately after the procedure--it usually takes the steroid 3-5 days to start working.   It may take several weeks for the medicine to reach its' full effect.   Pay attention to how much pain relief (what percentage compared to before the procedure) you get and for how long it lasts.     Activity: Avoid strenuous activity for 24 hours. After that return to your normal activity level.     Bandages: Remove after 24 hours     Showering/Bathing: You may shower after bandage is removed     Follow up: CALL OFFICE IN 7 DAYS [phone] LEAVE MESSAGE ABOUT THE RELIEF THAT WAS OBTAINED      Call the doctor immediately: if you notice:     Excessive bleeding from procedure site (brisk bright red bleeding from the site or bleeding that soaks the bandages or does not stop)   Severe headache  Inability to walk, leg or arm weakness or numbness that is worse after the procedure   Uncontrolled pain   New urinary or fecal incontinence   Signs of infection: Fever above 101.5F, redness, swelling, pus or drainage from the site    Epidural Injection    Why is this procedure done?  With an epidural injection, the doctor injects drugs deep into the area around your spinal cord. This is different than epidural anesthesia that is used for surgery or when a woman has a baby. Your spine is a group of bones in your back that protect the nerves in your spinal cord. Problems with your spine can cause swollen nerves in the spinal cord. This swelling leads to pain and can limit movement. In an epidural injection, the doctor may give you a drug to help with swelling and pain.  You may have an epidural injection in different parts of your back, based on where your pain is. For pain in your head or arms, you may have a cervical epidural injection. If your pain is in your upper or middle back, you may get a thoracic epidural injection. For pain in your lower back or legs, you may get a  lumbar epidural injection.    What will the results be?  The treatment may:  Lower pain  Reduce swelling in the nerves  Improve movement  What happens before the procedure?  Your doctor will take your history. Talk to the doctor about:  All the drugs you are taking. Be sure to include all prescription and over-the-counter (OTC) drugs, and herbal supplements. Tell the doctor about any drug allergy. Bring a list of drugs you take with you.  If you have high blood sugar or diabetes. Your drugs may need to be changed.  Any bleeding problems. Be sure to tell your doctor if you are taking any drugs that may cause bleeding. Some of these are warfarin, rivaroxaban, apixaban, ticagrelor, clopidogrel, ketorolac, ibuprofen, naproxen, or aspirin. Certain vitamins and herbs, such as garlic and fish oil, may also add to the risk for bleeding. You may need to stop these drugs as well. Talk to your doctor about them.  Tell the doctor if you are pregnant.  You will not be allowed to drive right away after the procedure. Ask a family member or a friend to drive you home.  What happens during the procedure?  To help the doctor make sure the drugs are being injected in the right place, your doctor may do an x-ray of your spine. Other times your doctor may do a continuous x-ray during the procedure. This is a fluoroscopy. The doctor may also use a colored dye or a contrast dye to check where to inject the drug.  You may be given a drug to help you relax. You may be given a drug to make the area of the injection numb.  The doctor will clean the skin on your back or neck. This helps prevent infection.  The doctor will put a needle through the skin toward your spine. The drug will be injected into a space near the spine.  The needle will be taken out and a bandage will be placed over the injection site.  What happens after the procedure?  Staff will check on you to make sure you are doing well. They will tell you when you can go  home.  What care is needed at home?  Relax on the day of the injection.  Do not drive or run machines for at least 12 hours afterwards.  Apply ice to the injection site. Place an ice pack or a bag of frozen peas wrapped in a towel over the painful part. Never put ice right on the skin. Do not leave the ice on more than 10 to 15 minutes at a time.  It may take a few days before you will feel the effects of the injection.  What follow-up care is needed?  Your doctor may ask you to make visits to the office to check on your progress. Be sure to keep these visits. You may also need to see a physical therapist (PT). The PT will teach you exercises to help you get back your strength and motion. Ask your doctor when you can exercise.  What problems could happen?  Bleeding  Infection (rare)  Headache  Nerve injury  If you have diabetes, your blood sugar can go up after the injection. Check with your doctor if you need more treatment for this.  Last Reviewed Date

## 2024-09-25 DIAGNOSIS — Z79.4 DIABETES MELLITUS TYPE 2, INSULIN DEPENDENT (MULTI): ICD-10-CM

## 2024-09-25 DIAGNOSIS — Z79.4 TYPE 2 DIABETES MELLITUS WITHOUT COMPLICATION, WITH LONG-TERM CURRENT USE OF INSULIN (MULTI): ICD-10-CM

## 2024-09-25 DIAGNOSIS — E11.9 TYPE 2 DIABETES MELLITUS WITHOUT COMPLICATION, WITH LONG-TERM CURRENT USE OF INSULIN (MULTI): ICD-10-CM

## 2024-09-25 DIAGNOSIS — E11.9 DIABETES MELLITUS TYPE 2, INSULIN DEPENDENT (MULTI): ICD-10-CM

## 2024-09-26 ENCOUNTER — APPOINTMENT (OUTPATIENT)
Dept: PAIN MEDICINE | Facility: CLINIC | Age: 69
End: 2024-09-26
Payer: MEDICARE

## 2024-09-26 ENCOUNTER — TELEPHONE (OUTPATIENT)
Dept: PAIN MEDICINE | Facility: CLINIC | Age: 69
End: 2024-09-26
Payer: MEDICARE

## 2024-09-27 RX ORDER — PIOGLITAZONEHYDROCHLORIDE 30 MG/1
30 TABLET ORAL DAILY
Qty: 90 TABLET | Refills: 1 | Status: SHIPPED | OUTPATIENT
Start: 2024-09-27

## 2024-10-02 ENCOUNTER — TELEPHONE (OUTPATIENT)
Dept: ENDOCRINOLOGY | Facility: CLINIC | Age: 69
End: 2024-10-02

## 2024-10-02 ENCOUNTER — APPOINTMENT (OUTPATIENT)
Dept: ENDOCRINOLOGY | Facility: CLINIC | Age: 69
End: 2024-10-02
Payer: MEDICARE

## 2024-10-02 VITALS
WEIGHT: 209 LBS | BODY MASS INDEX: 33.59 KG/M2 | DIASTOLIC BLOOD PRESSURE: 67 MMHG | SYSTOLIC BLOOD PRESSURE: 120 MMHG | HEIGHT: 66 IN

## 2024-10-02 DIAGNOSIS — E11.9 TYPE 2 DIABETES MELLITUS WITHOUT COMPLICATION, WITHOUT LONG-TERM CURRENT USE OF INSULIN (MULTI): Primary | ICD-10-CM

## 2024-10-02 DIAGNOSIS — E78.5 HYPERLIPIDEMIA, UNSPECIFIED HYPERLIPIDEMIA TYPE: ICD-10-CM

## 2024-10-02 DIAGNOSIS — Z97.8 USES SELF-APPLIED CONTINUOUS GLUCOSE MONITORING DEVICE: ICD-10-CM

## 2024-10-02 LAB
POC FINGERSTICK BLOOD GLUCOSE: 142 MG/DL (ref 70–100)
POC HEMOGLOBIN A1C: 6.8 % (ref 4.2–6.5)

## 2024-10-02 PROCEDURE — 3052F HG A1C>EQUAL 8.0%<EQUAL 9.0%: CPT | Performed by: HOSPITALIST

## 2024-10-02 PROCEDURE — 3074F SYST BP LT 130 MM HG: CPT | Performed by: HOSPITALIST

## 2024-10-02 PROCEDURE — 3008F BODY MASS INDEX DOCD: CPT | Performed by: HOSPITALIST

## 2024-10-02 PROCEDURE — 3061F NEG MICROALBUMINURIA REV: CPT | Performed by: HOSPITALIST

## 2024-10-02 PROCEDURE — 83036 HEMOGLOBIN GLYCOSYLATED A1C: CPT | Performed by: HOSPITALIST

## 2024-10-02 PROCEDURE — 1159F MED LIST DOCD IN RCRD: CPT | Performed by: HOSPITALIST

## 2024-10-02 PROCEDURE — 82962 GLUCOSE BLOOD TEST: CPT | Performed by: HOSPITALIST

## 2024-10-02 PROCEDURE — 3078F DIAST BP <80 MM HG: CPT | Performed by: HOSPITALIST

## 2024-10-02 PROCEDURE — 3048F LDL-C <100 MG/DL: CPT | Performed by: HOSPITALIST

## 2024-10-02 PROCEDURE — 1157F ADVNC CARE PLAN IN RCRD: CPT | Performed by: HOSPITALIST

## 2024-10-02 PROCEDURE — 99214 OFFICE O/P EST MOD 30 MIN: CPT | Performed by: HOSPITALIST

## 2024-10-02 PROCEDURE — 4010F ACE/ARB THERAPY RXD/TAKEN: CPT | Performed by: HOSPITALIST

## 2024-10-02 PROCEDURE — 95251 CONT GLUC MNTR ANALYSIS I&R: CPT | Performed by: HOSPITALIST

## 2024-10-02 RX ORDER — TIRZEPATIDE 2.5 MG/.5ML
2.5 INJECTION, SOLUTION SUBCUTANEOUS WEEKLY
Qty: 4 PEN | Refills: 0 | Status: SHIPPED | OUTPATIENT
Start: 2024-10-02 | End: 2024-11-01

## 2024-10-02 ASSESSMENT — ENCOUNTER SYMPTOMS
PHOTOPHOBIA: 0
ARTHRALGIAS: 1
DIARRHEA: 0
DYSURIA: 0
FREQUENCY: 0
SORE THROAT: 0
HEADACHES: 0
SLEEP DISTURBANCE: 0
NAUSEA: 0
TREMORS: 0
EYE ITCHING: 0
AGITATION: 0
ABDOMINAL DISTENTION: 0
PALPITATIONS: 0
LIGHT-HEADEDNESS: 0
ABDOMINAL PAIN: 0
VOMITING: 0
CONSTIPATION: 0
CONSTITUTIONAL NEGATIVE: 1
VOICE CHANGE: 0
SHORTNESS OF BREATH: 0
TROUBLE SWALLOWING: 0
NERVOUS/ANXIOUS: 0
CHEST TIGHTNESS: 0

## 2024-10-02 NOTE — PATIENT INSTRUCTIONS
We are going to start a new medication called Mounjaro 2.5 mg once weekly for 4 weeks.  If any nausea vomiting constipation bloating burping heartburn please call    If no side effects on Mounjaro 2.5 mg once weekly we will increase the dose to 5 mg once weekly    After starting Mounjaro decrease your insulin to 25 units twice a day before meals.  Please take insulin 20 minutes before eating and not more than that    If Mounjaro is not covered by insurance do not change anything     If any steroid injections pls call me

## 2024-10-02 NOTE — PROGRESS NOTES
Subjective   Patient ID: Dalton Champion is a 69 y.o. male who presents for Diabetes (Dx DM: around 1999/[facility] DM: father, grandparent, brother/PCP: [facility] /Podiatry: does not see one /Patient testing glucose 4 times daily with dexcom G7-LINKED. Due to fluctuating blood glucose and hypoglycemia. Patient is adjusting insulin based on glucose readings at meals. Pt adhering and benefiting from cgm treatment plan. /3/12/2024 hga1c 8.3%).  Lab Results   Component Value Date    HGBA1C 6.8 (A) 10/02/2024      HPI   See AP     Review of Systems   Constitutional: Negative.    HENT:  Negative for sore throat, trouble swallowing and voice change.    Eyes:  Negative for photophobia, itching and visual disturbance.   Respiratory:  Negative for chest tightness and shortness of breath.    Cardiovascular:  Negative for chest pain and palpitations.   Gastrointestinal:  Negative for abdominal distention, abdominal pain, constipation, diarrhea, nausea and vomiting.   Endocrine: Negative for cold intolerance, heat intolerance and polyuria.   Genitourinary:  Negative for dysuria and frequency.   Musculoskeletal:  Positive for arthralgias.   Skin:  Negative for pallor.   Allergic/Immunologic: Negative for environmental allergies.   Neurological:  Negative for tremors, light-headedness and headaches.   Psychiatric/Behavioral:  Negative for agitation and sleep disturbance. The patient is not nervous/anxious.        Objective   Physical Exam  Constitutional:       Appearance: Normal appearance.   HENT:      Head: Normocephalic.      Nose: Nose normal.      Mouth/Throat:      Mouth: Mucous membranes are moist.   Eyes:      Extraocular Movements: Extraocular movements intact.   Cardiovascular:      Rate and Rhythm: Normal rate.   Pulmonary:      Effort: Pulmonary effort is normal. No respiratory distress.   Abdominal:      General: There is no distension.   Musculoskeletal:         General: Normal range of motion.      Cervical back: Normal  "range of motion and neck supple.   Skin:     General: Skin is warm and dry.   Neurological:      Mental Status: He is alert and oriented to person, place, and time.   Psychiatric:         Mood and Affect: Mood normal.      Visit Vitals  /67   Ht 1.676 m (5' 6\")   Wt 94.8 kg (209 lb)   BMI 33.73 kg/m²   Smoking Status Former   BSA 2.1 m²        Assessment/Plan   Diagnoses and all orders for this visit:  Type 2 diabetes mellitus without complication, without long-term current use of insulin (Multi)  -     POCT glucose manually resulted  -     POCT glycosylated hemoglobin (Hb A1C) manually resulted  -     tirzepatide (Mounjaro) 2.5 mg/0.5 mL pen injector; Inject 2.5 mg under the skin 1 (one) time per week.  -     Referral to Clinical Pharmacy; Future  Uses self-applied continuous glucose monitoring device  Hyperlipidemia, unspecified hyperlipidemia type            T2DM , uncontrolled   Dx DM: around 1999     Current regimen:   pioglitzaone 15mg qd   metformin 1000mg BID  Humalog 75/25 35-0-35-0  ( dinner at 6 -has been taking it before eating )    Jardiance 10 mg daily ( started may 2023  ) denies any side effects     Downloaded Dexcom G7 reviewed and interpreted-   Active time 93%.  65% in range 1% low  Low blood sugars seems to be false when he checks on fingerstick      A1c 6.8 %     Knee arthroscopy-April 2024    Interval : History of mild constipation takes fiber helps.    PLAN :   He is interested in GLP-1 agonist.  I discussed GLP-1/GIP agonist Mounjaro.  Discussed mechanism of action side effects risks including pancreatitis and cholecystitis.  Worsening constipation, dehydration  We are going to start a new medication called Mounjaro 2.5 mg once weekly for 4 weeks.  If any nausea vomiting constipation bloating burping heartburn please call    If no side effects on Mounjaro 2.5 mg once weekly we will increase the dose to 5 mg once weekly    After starting Mounjaro decrease your insulin to 25 units twice a " day before meals.  Please take insulin 20 minutes before eating and not more than that    If Mounjaro is not covered by insurance do not change anything     If any steroid injections pls call me       Continue JARDIANCE/ METFORMIN AND PIOGLITAZONE   - DIscussed hypoglycemia and the Management of hypoglycemia.   - on statin and ARB   Continue G7.  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

## 2024-10-02 NOTE — TELEPHONE ENCOUNTER
Dalton called stating we need to call [facility] to explain why we only sent in one month  supply of his Mounjaro 2.5mg His insurance usually does three months.

## 2024-10-14 ENCOUNTER — APPOINTMENT (OUTPATIENT)
Dept: PRIMARY CARE | Facility: CLINIC | Age: 69
End: 2024-10-14
Payer: MEDICARE

## 2024-10-21 DIAGNOSIS — E11.9 TYPE 2 DIABETES MELLITUS WITHOUT COMPLICATION, WITHOUT LONG-TERM CURRENT USE OF INSULIN (MULTI): ICD-10-CM

## 2024-10-21 RX ORDER — EMPAGLIFLOZIN 10 MG/1
10 TABLET, FILM COATED ORAL DAILY
Qty: 90 TABLET | Refills: 1 | Status: SHIPPED | OUTPATIENT
Start: 2024-10-21

## 2024-10-28 DIAGNOSIS — E11.9 TYPE 2 DIABETES MELLITUS WITHOUT COMPLICATION, WITHOUT LONG-TERM CURRENT USE OF INSULIN (MULTI): ICD-10-CM

## 2024-10-28 RX ORDER — TIRZEPATIDE 5 MG/.5ML
5 INJECTION, SOLUTION SUBCUTANEOUS
Qty: 6 ML | Refills: 0 | Status: SHIPPED | OUTPATIENT
Start: 2024-10-28

## 2024-11-05 ENCOUNTER — LAB (OUTPATIENT)
Dept: LAB | Facility: LAB | Age: 69
End: 2024-11-05
Payer: MEDICARE

## 2024-11-05 DIAGNOSIS — N18.30 STAGE 3 CHRONIC KIDNEY DISEASE, UNSPECIFIED WHETHER STAGE 3A OR 3B CKD (MULTI): ICD-10-CM

## 2024-11-05 DIAGNOSIS — E55.9 VITAMIN D DEFICIENCY: ICD-10-CM

## 2024-11-05 DIAGNOSIS — R79.89 ELEVATED TSH: ICD-10-CM

## 2024-11-05 DIAGNOSIS — Z79.4 TYPE 2 DIABETES MELLITUS WITHOUT COMPLICATION, WITH LONG-TERM CURRENT USE OF INSULIN (MULTI): ICD-10-CM

## 2024-11-05 DIAGNOSIS — I10 HTN (HYPERTENSION), BENIGN: ICD-10-CM

## 2024-11-05 DIAGNOSIS — I10 ESSENTIAL HYPERTENSION: ICD-10-CM

## 2024-11-05 DIAGNOSIS — E08.22 DIABETES MELLITUS DUE TO UNDERLYING CONDITION WITH DIABETIC CHRONIC KIDNEY DISEASE, UNSPECIFIED CKD STAGE, UNSPECIFIED WHETHER LONG TERM INSULIN USE: ICD-10-CM

## 2024-11-05 DIAGNOSIS — E78.49 OTHER HYPERLIPIDEMIA: ICD-10-CM

## 2024-11-05 DIAGNOSIS — N18.31 STAGE 3A CHRONIC KIDNEY DISEASE (MULTI): ICD-10-CM

## 2024-11-05 DIAGNOSIS — E11.9 TYPE 2 DIABETES MELLITUS WITHOUT COMPLICATION, WITH LONG-TERM CURRENT USE OF INSULIN (MULTI): ICD-10-CM

## 2024-11-05 LAB
25(OH)D3 SERPL-MCNC: 22 NG/ML (ref 30–100)
ALBUMIN SERPL BCP-MCNC: 4.1 G/DL (ref 3.4–5)
ALP SERPL-CCNC: 68 U/L (ref 33–136)
ALT SERPL W P-5'-P-CCNC: 14 U/L (ref 10–52)
ANION GAP SERPL CALC-SCNC: 13 MMOL/L (ref 10–20)
AST SERPL W P-5'-P-CCNC: 23 U/L (ref 9–39)
BILIRUB SERPL-MCNC: 0.4 MG/DL (ref 0–1.2)
BUN SERPL-MCNC: 21 MG/DL (ref 6–23)
CALCIUM SERPL-MCNC: 8.8 MG/DL (ref 8.6–10.3)
CHLORIDE SERPL-SCNC: 101 MMOL/L (ref 98–107)
CHOLEST SERPL-MCNC: 127 MG/DL (ref 0–199)
CHOLESTEROL/HDL RATIO: 3.1
CO2 SERPL-SCNC: 25 MMOL/L (ref 21–32)
CREAT SERPL-MCNC: 1.6 MG/DL (ref 0.5–1.3)
CREAT UR-MCNC: 66.6 MG/DL (ref 20–370)
EGFRCR SERPLBLD CKD-EPI 2021: 46 ML/MIN/1.73M*2
EST. AVERAGE GLUCOSE BLD GHB EST-MCNC: 146 MG/DL
GLUCOSE SERPL-MCNC: 122 MG/DL (ref 74–99)
HBA1C MFR BLD: 6.7 %
HDLC SERPL-MCNC: 40.7 MG/DL
LDLC SERPL CALC-MCNC: 46 MG/DL
NON HDL CHOLESTEROL: 86 MG/DL (ref 0–149)
POTASSIUM SERPL-SCNC: 4.4 MMOL/L (ref 3.5–5.3)
PROT SERPL-MCNC: 6.7 G/DL (ref 6.4–8.2)
PROT UR-ACNC: 7 MG/DL (ref 5–25)
PROT/CREAT UR: 0.11 MG/MG CREAT (ref 0–0.17)
PTH-INTACT SERPL-MCNC: 42.9 PG/ML (ref 18.5–88)
RBC #/AREA URNS AUTO: NORMAL /HPF
SODIUM SERPL-SCNC: 135 MMOL/L (ref 136–145)
T4 FREE SERPL-MCNC: 0.98 NG/DL (ref 0.61–1.12)
TRIGL SERPL-MCNC: 203 MG/DL (ref 0–149)
TSH SERPL-ACNC: 4.23 MIU/L (ref 0.44–3.98)
VLDL: 41 MG/DL (ref 0–40)
WBC #/AREA URNS AUTO: NORMAL /HPF

## 2024-11-05 PROCEDURE — 83970 ASSAY OF PARATHORMONE: CPT

## 2024-11-05 PROCEDURE — 84156 ASSAY OF PROTEIN URINE: CPT

## 2024-11-05 PROCEDURE — 80053 COMPREHEN METABOLIC PANEL: CPT

## 2024-11-05 PROCEDURE — 84439 ASSAY OF FREE THYROXINE: CPT

## 2024-11-05 PROCEDURE — 82306 VITAMIN D 25 HYDROXY: CPT

## 2024-11-05 PROCEDURE — 36415 COLL VENOUS BLD VENIPUNCTURE: CPT

## 2024-11-05 PROCEDURE — 80061 LIPID PANEL: CPT

## 2024-11-05 PROCEDURE — 84443 ASSAY THYROID STIM HORMONE: CPT

## 2024-11-05 PROCEDURE — 82570 ASSAY OF URINE CREATININE: CPT

## 2024-11-05 PROCEDURE — 83036 HEMOGLOBIN GLYCOSYLATED A1C: CPT

## 2024-11-05 PROCEDURE — 81001 URINALYSIS AUTO W/SCOPE: CPT

## 2024-11-05 RX ORDER — ERGOCALCIFEROL 1.25 MG/1
50000 CAPSULE ORAL
Qty: 6 CAPSULE | Refills: 2 | Status: SHIPPED | OUTPATIENT
Start: 2024-11-05

## 2024-11-06 ENCOUNTER — TELEPHONE (OUTPATIENT)
Dept: NEPHROLOGY | Facility: CLINIC | Age: 69
End: 2024-11-06
Payer: MEDICARE

## 2024-11-06 NOTE — PROGRESS NOTES
Patient is sent at the request of Addy John MD for my opinion regarding diabetes.  My recommendations below will be communicated back to the requesting provider by way of shared medical record.    Recommendations:   Decrease Humalog 75/25 from 25 units BID to 20 units BID now  Increase Mounjaro to 5mg weekly as planned  ________________________________________________________________________    Subjective   Past Medical History:  Patient Active Problem List   Diagnosis    Cervical radiculopathy, chronic    Lumbar radiculopathy    Change in bowel habits    Irregular bowel habits    Colon polyp    Type 2 diabetes mellitus without complication, with long-term current use of insulin (Multi)    Elevated TSH    Fecal incontinence    GERD (gastroesophageal reflux disease)    Hyperlipidemia    Lung nodule    Osteoarthritis of knee    Sciatica of right side    Basal cell carcinoma of skin of other part of trunk    Hemangioma of skin and subcutaneous tissue    Melanocytic nevi of trunk    Neoplasm of uncertain behavior of skin    Other hypertrophic disorders of the skin    Other melanin hyperpigmentation    Congenital absence of right kidney    Long-term insulin use (Multi)    Unilateral primary osteoarthritis, right knee    Class 1 obesity due to excess calories with serious comorbidity and body mass index (BMI) of 31.0 to 31.9 in adult    Osteoarthritis of multiple joints    Frequent urination    Stage 3 chronic kidney disease, unspecified whether stage 3a or 3b CKD (Multi)    Medication dose increased    Encounter to discuss test results    Pre-operative exam    Dietary counseling and surveillance    Total knee replacement status, left    HTN (hypertension), benign    Knee pain     Interim:  Dalton Champion is a 69 y.o. male presents today for new patient visit with endo PharmD for Type 2 Diabetes Mellitus. Last seen by Addy John on 10/2/24 where Mounjaro started with plan to increase to 5mg after 1 month, and  "Humlog 75/25 decreased from 35 units --> 25 units BID when starting Mounjaro, and planned to decrease further to 20 units BID when increasing Mounjaro.    Today the patient denies acute concerns, tolerating Mounjaro well overall and is due to increase this coming Sunday. Does report lows occasionally, but states he does not always treat them.    Diabetes Pharmacotherapy:    Pioglitazone 15mg daily   Metformin 1000mg BID  Humalog 75/25 25-0-25-0   Takes ~20 minutes before eating  Jardiance 10 mg daily  Mounjaro 2.5mg weekly (Sundays)  Completed 4 weeks  Some burps, denies other ADRs  States Mounjaro 5mg is en route    Social:  Current diet: on average, 3 meals per day  Breakfast - couple donuts  Lunch - skips or has sandwich  Dinner - meatloaf, green beans  Snack - cashews, sometimes chips  Fluids - water, coffee (black), diet pop    Allergies:  Iodinated contrast media and Iodine    Medication list:  Current Outpatient Medications   Medication Instructions    amitriptyline (ELAVIL) 25 mg, oral, Nightly    blood sugar diagnostic (FreeStyle Lite Strips) strip Use up to 4 times daily as needed for CGM failure    blood-glucose sensor (Dexcom G7 Sensor) device CHANGE EVERY 10 DAYS    candesartan (ATACAND) 32 mg, oral, Daily    cimetidine (TAGAMET) 800 mg, oral, Nightly    ergocalciferol (VITAMIN D-2) 50,000 Units, oral, Every 14 days    ergocalciferol (VITAMIN D-2) 50,000 Units, oral, Every 14 days    insulin lispro protamin-lispro (HumaLOG Mix 75-25 KwikPen) 100 unit/mL (75-25) injection INJECT 35 UNITS IN THE MORNING AND 35 UNITS IN THE EVENING as directed    Jardiance 10 mg, oral, Daily    metFORMIN (GLUCOPHAGE) 1,000 mg, oral, Every 12 hours    Mounjaro 5 mg, subcutaneous, Every 7 days    pen needle, diabetic (BD Ultra-Fine Vandana Pen Needle) 32 gauge x 5/32\" needle Use one needle twice daily for insulin injections    pioglitazone (ACTOS) 30 mg, oral, Daily    simvastatin (ZOCOR) 10 mg, oral, Daily    solifenacin " (VESICARE) 5 mg, oral, Daily        Objective   Last Recorded Vitals:  BP Readings from Last 3 Encounters:   10/02/24 120/67   09/19/24 154/76   09/12/24 175/74     Wt Readings from Last 3 Encounters:   10/02/24 94.8 kg (209 lb)   05/17/24 92.5 kg (204 lb)   05/02/24 89.8 kg (198 lb)     BMI Readings from Last 1 Encounters:   10/02/24 33.73 kg/m²      Labs  A1C  Lab Results   Component Value Date    HGBA1C 6.7 (H) 11/05/2024    HGBA1C 6.8 (A) 10/02/2024    HGBA1C 8.3 (H) 03/12/2024     BMP/LFTs  Lab Results   Component Value Date    CREATININE 1.60 (H) 11/05/2024    CREATININE 1.33 (H) 04/29/2024    CREATININE 1.27 04/02/2024    EGFR 46 (L) 11/05/2024    EGFR 57 (L) 04/29/2024    EGFR 60 (L) 04/02/2024    GLUCOSE 122 (H) 11/05/2024     (L) 11/05/2024    K 4.4 11/05/2024     11/05/2024    CALCIUM 8.8 11/05/2024    CO2 25 11/05/2024    BUN 21 11/05/2024    ALT 14 11/05/2024    AST 23 11/05/2024    ALKPHOS 68 11/05/2024    BILITOT 0.4 11/05/2024     Lipids  Lab Results   Component Value Date    TRIG 203 (H) 11/05/2024    CHOL 127 11/05/2024    LDLF 68 08/10/2023    LDLCALC 46 11/05/2024    HDL 40.7 11/05/2024     Urine Albumin Creatinine Ratio  Lab Results   Component Value Date    MICROALBCREA 21.9 03/12/2024    MICROALBCREA 80.7 (H) 03/01/2023    MICROALBCREA 18.0 05/24/2019       Home glucose monitoring:  Hypoglycemia:  3% TBR, often does not treat lows, does not feel lows unless <60        Assessment/Plan   Type 2 Diabetes Mellitus  Goal A1C <7%, at goal as of 11/2024. CGM data showing very well controlled TIR and GMI, however pt is having some hypoglycemia. Discussed importance of treating lows w/ pt. Will decrease insulin now, and continued planned increase in Mounjaro next week. Will continue to follow closely for Mounjaro/insulin optimization.  Plan:  Decrease Humalog 75/25 from 25 units BID to 20 units BID now  Increase Mounjaro to 5mg weekly as planned  Continue other medications  Home glucose  monitoring:   Will continue Dexcom G7  A minimum of 72 hours of CGM data was reviewed and used to make therapy changes.   Education Provided to Patient:   Rule of 15   Primary prevention:   Therapy: Moderate intensity statin   LDL result meets goal (11/2024)  Renal:  CKD: stage 3 - GFR 30-59  ACR: <30 (3/2024)  Renal protective agents: ACEi/ARB, SGLT2i, and GLP1  Follows with nephrology  DM medications are dosed appropriately for renal function  Labs: up to date   PharmD follow-up: 1 month  Endo follow-up: 11/13    Patient agreeable to plan as above, contact information provided for any future questions or concerns.    Arsh Hemphill, Saqib    Type of encounter: virtual    Continue all meds under the continuation of care with the referring provider and clinical pharmacy team.

## 2024-11-06 NOTE — RESULT ENCOUNTER NOTE
Labs acceptable but there are a few abnormalities similar to previous labs mainly the renal function and there is slight abnormality with the thyroid which can be reviewed with office follow-up.  Please advise patient to continue the same medications and follow-up to review in detail and discuss the management options as already scheduled appointment.  If patient has any symptoms of concern he should be seen by a PCP sooner than scheduled appointment.  He also need to follow-up with the nephrologist as instructed by the nephrologist.

## 2024-11-07 ENCOUNTER — APPOINTMENT (OUTPATIENT)
Dept: PHARMACY | Facility: HOSPITAL | Age: 69
End: 2024-11-07
Payer: MEDICARE

## 2024-11-07 DIAGNOSIS — E11.9 TYPE 2 DIABETES MELLITUS WITHOUT COMPLICATION, WITHOUT LONG-TERM CURRENT USE OF INSULIN (MULTI): ICD-10-CM

## 2024-11-12 NOTE — OP NOTE
Operative Note     Date: 2024  OR Location: [facility] OR    Name: Dalton Champion, : 1955, Age: 69 y.o., MRN: [id number], Sex: male    Diagnosis  Pre-op Diagnosis     * Unilateral primary osteoarthritis, right knee [M17.11] Post-op Diagnosis     * Unilateral primary osteoarthritis, right knee [M17.11]     Procedures  Total Knee Arthroplasty  33721 - MO ARTHRP KNE CONDYLE&PLATU MEDIAL&LAT COMPARTMENTS  (RIGHT)    Surgeons      * Steffen Frost - Primary    Resident/Fellow/Other Assistant:  Surgeon(s) and Role:  Chava Willams PA-C       Procedure Summary  Anesthesia: Spinal  ASA: III  Anesthesia Staff: Anesthesiologist: Jeffrey Sanchez DO  C-AA: RICK Plaza  Estimated Blood Loss: 50 mL  Intra-op Medications:   Administrations occurring from 1230 to 1435 on 24:   Medication Name Total Dose   ropivacaine-epinephrine-clonidine-ketorolac 2.46-0.005- 0.0008-0.3mg/mL periarticular syringe 100 mL   lactated Ringer's infusion 146.67 mL   ceFAZolin in dextrose (iso-os) (Ancef) IVPB 2 g 2 g              Anesthesia Record               Intraprocedure I/O Totals       None           Specimen: No specimens collected     Staff:   Circulator: Louis De Jesus RN  Scrub Person: Heather Freitas; Jeffrey Klein           Implants:    Implants       Type Name Action Serial No.      Joint INSERT, TIBIAL, X3 TRIATHLON CS, SZ-5 11MM - SN/A - [id number] Implanted N/A     Joint PLATE, TIBIAL TRIATH DAVID STEWART FXD BPLT P5 - SN/A - [id number] Implanted N/A     Joint PATELLA, TRIATHLON, ASYMMETRIC X3, SZ-A29 9MM - SN/A - [id number] Implanted N/A     Joint FEM COMP, TRIATH CR SZ4 RIGHT - SN/A - [id number] Implanted N/A              Findings: see procedure details    Indications:     The patient was seen in the preoperative area. The risks, benefits, complications, treatment options, non-operative alternatives, expected recovery and outcomes were discussed with the patient. The possibilities of reaction to medication, pulmonary  aspiration, injury to surrounding structures, bleeding, recurrent infection, the need for additional procedures, failure to diagnose a condition, and creating a complication requiring transfusion or operation were discussed with the patient. The patient concurred with the proposed plan, giving informed consent.  The site of surgery was properly noted/marked if necessary per policy. The patient has been actively warmed in preoperative area. Preoperative antibiotics have been ordered and given within 1 hours of incision. Venous thrombosis prophylaxis have been ordered.    The patient failed multiple attempts at non-surgical management.  Specific to TKA we discussed the risks of infection / revision surgery, DVT/PE, medical complications, stiffness / loss of motion, neurologic (foot drop) or vascular injury.    Procedure Details:   Patient was met prior to surgery in pre-operative holding.  The appropriate extremity was marked and recent health status was reviewed and we found no contraindication to proceeding with the scheduled procedure.  We again reviewed the risks of infection, wound issues, deep venous thrombosis, pulmonary embolism, medical complications including cardiac and pulmonary, neurologic and vascular injury and incomplete relief of pre-operative pain.    The patient discussed regional anesthesia in pre-op holding.  The patient was transported to the operating room.  A thigh tourniquet was placed and a small bump on the buttock.  The patient was resting comfortably in a supine position with all karen prominences well padded.  Sequential compression device was placed on the contralateral lower extremity.  An exam under anesthesia was performed to evaluate the patient´s range of motion and ligamentous integrity.    The patient was prepped and draped in the usual sterile fashion.  The leg was placed in a well padded leg clark.  After prep, drape, antibiotic and time out, the leg was exsanguinated and the  tourniquet inflated.  A longitudinal incision was made over the anterior knee.  The dissection was carried out through the skin and subcutaneous tissue.  A medial parapatellar arthrotomy was performed.  An effusion and synovitis was noted compatible with the grade IV changes of the articular cartilage.  The fat pad was slightly de-bulked, [place]´s line was marked and some of the deep medial collateral ligament was released from the tibia.  The ACL was resected. The knee was flexed up and medial and lateral retractors were placed to protect the collateral ligaments and popliteus tendon.      Due to the degree of deformity and stiffness, the posterior cruciate ligament was resected.    A canal finder reamer was utilized to gain entry into the femur.  An intramedullary galileo was placed by hand and set to 5 degrees of valgus for the distal femoral cut.  The cutting block was placed and the distal femoral cut was performed.   A sizing guide was then placed and the femoral size was measured based on posterior referencing.  The 4-in-1 cutting block was placed set to 3 degrees of external rotation.  The rotation was confirmed based on the transepicondylar axis and Gladys´s line.  There was no significant hypoplasia of the posterior condyles.     Once the cutting block was placed the cuts were performed: anterior, posterior and chamfer cuts.  There collaterals were protected and the bone was removed.  There was no notching of the femur.   Once the femoral cuts were complete, we turned our attention to the tibia.  Retractors were placed medial, lateral and posteriorly.  An extramedullary alignment galileo was used and the slope was set in accordance with the implant.  We measured 2 mm of resection from the lowest point on the tibia.  The tibial cut was completed with care not to encroach posterior to the knee joint.    After the tibial cut was completed, we removed the remaining menisci.  Using a curved osteotome posterior  osteophytes were carefully removed from the femur.  We then assessed the flexion and extension gaps using trial spacer blocks.    The gaps appeared symmetric with a spacer block and we proceeded to placing trial implants.  With symmetric gaps we progressed to placing trial implants.The final poly was a 9 CS.    A trial tibial component was placed with care to avoid overhang.  The rotation was set in line with the medial third of the tibial tubercle.  A trial femoral component was then placed, followed by a trial articular surface.  The knee was taken through range of motion with the provisional components.  The flexion and extension gaps were evaluated, as well as the patellar tracking and mid-flexion stability.  The following soft tissue balancing, recuts, and/or modifications were required: none.      The patella was everted and ~9 mm of bone were removed from the thickest portion using a clamp/cutting guide.  The lug holes were drilled in the patella and femur.  The tibia was prepared with the drill and broach after confirmation of alignment using a drop galileo.   The sclerotic areas of the bone were drilled using a small drill bit.  The capsule around the knee was injected using a long acting local anesthetic / multimodal pain mixture.    The cement was mixed in a vacuum sealed fashion while the bone was pulsatile lavaged.   The bone was dried and the implants were placed with a gentle posterior directed force and a clamp on the patella.  The excess cement was removed.   After the cement dried the gap balancing was reassessed prior to placing the final articular surface.  The proud cement mantle was removed using a small osteotome.  The knee was then copiously lavaged with sterile saline and Irrisept (0.05% chlorhexidine gluconate).  The tourniquet was taken down and hemostasis was obtained using Bovie electrocautery and tranexamic acid (per protocol).  No significant bleeders were encountered and the usage of a  drain was not felt to be necessary.    A layered closure was performed using 1 Vicryl and 1 Stratafix in the retinacular layer and quadriceps tendon.  2-0 vicryl in the deep dermal layer and monofilament in the skin.  An adherent, water proof dressing was placed followed by Webril and an ace bandage. All counts were reported as correct.  The patient was stable to the post anesthesia care unit.    I was present and participated in the entire procedure. The Physician Assistant participated in all critical elements of the procedure under my direct supervision. The surgical incision was closed by the PA under my indirect supervision. There were no qualified residents available to assist.    The physician assistant was present for the entire case.  Given the nature of the procedure and disease process, a skilled surgical assistant was necessary for the case.  The assistant was necessary for retraction and helped directly facilitate completion of the surgery.  A certified scrub tech was at the back table managing instruments and supplies for the surgical procedure.    Complications:  None; patient tolerated the procedure well.    Disposition: PACU - hemodynamically stable.  Condition: stable         Steffen Frost  Phone Number: [phone]         Is This A New Presentation, Or A Follow-Up?: Skin Lesions How Severe Is Your Skin Lesion?: moderate Have Your Skin Lesions Been Treated?: not been treated

## 2024-11-13 ENCOUNTER — APPOINTMENT (OUTPATIENT)
Dept: NEPHROLOGY | Facility: CLINIC | Age: 69
End: 2024-11-13
Payer: MEDICARE

## 2024-11-13 ENCOUNTER — TELEMEDICINE (OUTPATIENT)
Dept: ENDOCRINOLOGY | Facility: CLINIC | Age: 69
End: 2024-11-13
Payer: MEDICARE

## 2024-11-13 VITALS
WEIGHT: 207.4 LBS | HEART RATE: 87 BPM | DIASTOLIC BLOOD PRESSURE: 88 MMHG | SYSTOLIC BLOOD PRESSURE: 132 MMHG | HEIGHT: 66 IN | BODY MASS INDEX: 33.33 KG/M2

## 2024-11-13 DIAGNOSIS — E11.9 TYPE 2 DIABETES MELLITUS WITHOUT COMPLICATION, WITH LONG-TERM CURRENT USE OF INSULIN (MULTI): Primary | ICD-10-CM

## 2024-11-13 DIAGNOSIS — I10 HTN (HYPERTENSION), BENIGN: ICD-10-CM

## 2024-11-13 DIAGNOSIS — E55.9 VITAMIN D DEFICIENCY: ICD-10-CM

## 2024-11-13 DIAGNOSIS — E66.811 CLASS 1 OBESITY DUE TO EXCESS CALORIES WITH SERIOUS COMORBIDITY AND BODY MASS INDEX (BMI) OF 31.0 TO 31.9 IN ADULT: ICD-10-CM

## 2024-11-13 DIAGNOSIS — E66.09 CLASS 1 OBESITY DUE TO EXCESS CALORIES WITH SERIOUS COMORBIDITY AND BODY MASS INDEX (BMI) OF 31.0 TO 31.9 IN ADULT: ICD-10-CM

## 2024-11-13 DIAGNOSIS — E08.22 DIABETES MELLITUS DUE TO UNDERLYING CONDITION WITH DIABETIC CHRONIC KIDNEY DISEASE, UNSPECIFIED CKD STAGE, UNSPECIFIED WHETHER LONG TERM INSULIN USE: ICD-10-CM

## 2024-11-13 DIAGNOSIS — N18.30 STAGE 3 CHRONIC KIDNEY DISEASE, UNSPECIFIED WHETHER STAGE 3A OR 3B CKD (MULTI): ICD-10-CM

## 2024-11-13 DIAGNOSIS — Z79.4 TYPE 2 DIABETES MELLITUS WITHOUT COMPLICATION, WITH LONG-TERM CURRENT USE OF INSULIN (MULTI): Primary | ICD-10-CM

## 2024-11-13 DIAGNOSIS — I10 ESSENTIAL HYPERTENSION: ICD-10-CM

## 2024-11-13 DIAGNOSIS — E78.5 HYPERLIPIDEMIA, UNSPECIFIED HYPERLIPIDEMIA TYPE: ICD-10-CM

## 2024-11-13 DIAGNOSIS — N18.31 STAGE 3A CHRONIC KIDNEY DISEASE (MULTI): Primary | ICD-10-CM

## 2024-11-13 PROCEDURE — 1036F TOBACCO NON-USER: CPT | Performed by: HOSPITALIST

## 2024-11-13 PROCEDURE — 1157F ADVNC CARE PLAN IN RCRD: CPT | Performed by: HOSPITALIST

## 2024-11-13 PROCEDURE — 3044F HG A1C LEVEL LT 7.0%: CPT | Performed by: INTERNAL MEDICINE

## 2024-11-13 PROCEDURE — 3048F LDL-C <100 MG/DL: CPT | Performed by: INTERNAL MEDICINE

## 2024-11-13 PROCEDURE — 3044F HG A1C LEVEL LT 7.0%: CPT | Performed by: HOSPITALIST

## 2024-11-13 PROCEDURE — 3061F NEG MICROALBUMINURIA REV: CPT | Performed by: HOSPITALIST

## 2024-11-13 PROCEDURE — 3048F LDL-C <100 MG/DL: CPT | Performed by: HOSPITALIST

## 2024-11-13 PROCEDURE — 1159F MED LIST DOCD IN RCRD: CPT | Performed by: INTERNAL MEDICINE

## 2024-11-13 PROCEDURE — 3075F SYST BP GE 130 - 139MM HG: CPT | Performed by: INTERNAL MEDICINE

## 2024-11-13 PROCEDURE — 95251 CONT GLUC MNTR ANALYSIS I&R: CPT | Performed by: HOSPITALIST

## 2024-11-13 PROCEDURE — 3008F BODY MASS INDEX DOCD: CPT | Performed by: INTERNAL MEDICINE

## 2024-11-13 PROCEDURE — 99443 PR PHYS/QHP TELEPHONE EVALUATION 21-30 MIN: CPT | Performed by: HOSPITALIST

## 2024-11-13 PROCEDURE — 4010F ACE/ARB THERAPY RXD/TAKEN: CPT | Performed by: HOSPITALIST

## 2024-11-13 PROCEDURE — 3079F DIAST BP 80-89 MM HG: CPT | Performed by: INTERNAL MEDICINE

## 2024-11-13 PROCEDURE — 3061F NEG MICROALBUMINURIA REV: CPT | Performed by: INTERNAL MEDICINE

## 2024-11-13 PROCEDURE — 1157F ADVNC CARE PLAN IN RCRD: CPT | Performed by: INTERNAL MEDICINE

## 2024-11-13 PROCEDURE — 1036F TOBACCO NON-USER: CPT | Performed by: INTERNAL MEDICINE

## 2024-11-13 PROCEDURE — 99214 OFFICE O/P EST MOD 30 MIN: CPT | Performed by: INTERNAL MEDICINE

## 2024-11-13 PROCEDURE — 4010F ACE/ARB THERAPY RXD/TAKEN: CPT | Performed by: INTERNAL MEDICINE

## 2024-11-13 RX ORDER — SIMVASTATIN 10 MG/1
10 TABLET, FILM COATED ORAL DAILY
Qty: 90 TABLET | Refills: 3 | Status: SHIPPED | OUTPATIENT
Start: 2024-11-13

## 2024-11-13 ASSESSMENT — ENCOUNTER SYMPTOMS
EYE ITCHING: 0
DIARRHEA: 0
PALPITATIONS: 0
AGITATION: 0
TROUBLE SWALLOWING: 0
SORE THROAT: 0
SLEEP DISTURBANCE: 0
SHORTNESS OF BREATH: 0
CHEST TIGHTNESS: 0
NAUSEA: 0
HEADACHES: 0
CONSTITUTIONAL NEGATIVE: 1
FREQUENCY: 0
CONSTIPATION: 0
LIGHT-HEADEDNESS: 0
VOMITING: 0
VOICE CHANGE: 0
ARTHRALGIAS: 0
TREMORS: 0
ABDOMINAL DISTENTION: 0
ABDOMINAL PAIN: 0
NERVOUS/ANXIOUS: 0
DYSURIA: 0
PHOTOPHOBIA: 0

## 2024-11-13 NOTE — PROGRESS NOTES
Subjective   Patient ID: Dalton Champion is a 69 y.o. male who presents for Diabetes (TELEPHONE VISIT::: also working with  Pharmacy team- visit 11/7/2024 and increase mounjaro to 5mg 75/25 mix decreased to 20u BID; has follow up 12/10/2024 /Dx DM: around 1999/[facility] DM: father, grandparent, brother/PCP: [facility] /Podiatry: does not see one /Patient testing glucose 4 times daily with dexcom G7-LINKED. Due to fluctuating blood glucose and hypoglycemia. Patient is adjusting insulin based on glucose readings at meals. Pt adhering and benefiting from cgm treatment plan. ).  Lab Results   Component Value Date    HGBA1C 6.7 (H) 11/05/2024      HPI   See AP     Review of Systems   Constitutional: Negative.    HENT:  Negative for sore throat, trouble swallowing and voice change.    Eyes:  Negative for photophobia, itching and visual disturbance.   Respiratory:  Negative for chest tightness and shortness of breath.    Cardiovascular:  Negative for chest pain and palpitations.   Gastrointestinal:  Negative for abdominal distention, abdominal pain, constipation, diarrhea, nausea and vomiting.   Endocrine: Negative for cold intolerance, heat intolerance and polyuria.   Genitourinary:  Negative for dysuria and frequency.   Musculoskeletal:  Negative for arthralgias.   Skin:  Negative for pallor.   Allergic/Immunologic: Negative for environmental allergies.   Neurological:  Negative for tremors, light-headedness and headaches.   Psychiatric/Behavioral:  Negative for agitation and sleep disturbance. The patient is not nervous/anxious.        Objective   Physical Exam  Constitutional:       Appearance: Normal appearance.   HENT:      Head: Normocephalic.      Nose: Nose normal.      Mouth/Throat:      Mouth: Mucous membranes are moist.   Eyes:      Extraocular Movements: Extraocular movements intact.   Cardiovascular:      Rate and Rhythm: Normal rate.   Pulmonary:      Effort: Pulmonary effort is normal. No respiratory distress.    Abdominal:      General: There is no distension.   Musculoskeletal:         General: Normal range of motion.      Cervical back: Normal range of motion and neck supple.   Skin:     General: Skin is warm and dry.   Neurological:      Mental Status: He is alert and oriented to person, place, and time.   Psychiatric:         Mood and Affect: Mood normal.      NO vitals due to telephone visit     Assessment/Plan   Diagnoses and all orders for this visit:  Type 2 diabetes mellitus without complication, with long-term current use of insulin (Multi)  Class 1 obesity due to excess calories with serious comorbidity and body mass index (BMI) of 31.0 to 31.9 in adult  HTN (hypertension), benign                  T2DM , uncontrolled   Dx DM: around 1999     Current regimen:   pioglitzaone 15mg qd   metformin 1000mg BID  Humalog 75/25 20-0-20-0  ( dinner at 6 -has been taking it before eating, )    Jardiance 10 mg daily ( started may 2023  ) denies any side effects   Mounjaro 5 mg once weekly ( started 10/ 2024 Bloating +, burping, feels arguello)      Downloaded Dexcom G7 reviewed and interpreted-   Active time 100%.  98% in range 2% low  Low blood sugars seems to be false when he checks on fingerstick      A1c 6.8 %     Knee arthroscopy-April 2024    Interval : History of mild constipation takes fiber helps.  Bloating +, burping,   kidney function worse       PLAN :    Will not increase the mounjaro dose given bloating/ low appetite    Change insulin to 16-0-16-0   Drinking m,ore water   - if GFR < 45  next visit will have to decrease/ stop metformin, last GFR 46   - continue pioglitazone, jardiance      I discussed GLP-1/GIP agonist Mounjaro.  Discussed mechanism of action side effects risks including pancreatitis and cholecystitis.  Worsening constipation, dehydration  Continue JARDIANCE/ METFORMIN AND PIOGLITAZONE   - DIscussed hypoglycemia and the Management of hypoglycemia.   - on statin and ARB   Continue G7.  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

## 2024-11-13 NOTE — PROGRESS NOTES
Dalton Champion   69 y.o.    @@  N/Room: [id number]/Room/bed info not found    Subjective:   The patient is being seen for a routine clinic follow-up of chronic kidney disease. Recently, the disease has been stable. Disease complications:  No hyperkalemia, no hypocalcemia, no hyperphosphatemia, no metabolic acidosis, no coagulopathy, no uremic encephalopathy, no neuropathy and no renal osteodystrophy. The patient is currently asymptomatic. No associated symptoms are reported.       Meds:   Current Outpatient Medications   Medication Sig Dispense Refill    amitriptyline (Elavil) 25 mg tablet TAKE 1 TABLET DAILY AT BEDTIME 90 tablet 1    blood sugar diagnostic (FreeStyle Lite Strips) strip Use up to 4 times daily as needed for CGM failure 100 each 3    blood-glucose sensor (Dexcom G7 Sensor) device CHANGE EVERY 10 DAYS 6 each 3    candesartan (Atacand) 32 mg tablet Take 1 tablet (32 mg) by mouth once daily. 30 tablet 11    empagliflozin (Jardiance) 10 mg TAKE 1 TABLET DAILY 90 tablet 1    ergocalciferol (Vitamin D-2) 1.25 MG (43996 UT) capsule Take 1 capsule (50,000 Units) by mouth every 14 (fourteen) days. 6 capsule 2    insulin lispro protamin-lispro (HumaLOG Mix 75-25 KwikPen) 100 unit/mL (75-25) injection INJECT 35 UNITS IN THE MORNING AND 35 UNITS IN THE EVENING as directed (Patient taking differently: INJECT 20 UNITS IN THE MORNING AND 20 UNITS IN THE EVENING as directed) 75 mL 0    metFORMIN (Glucophage) 1,000 mg tablet TAKE 1 TABLET EVERY 12 HOURS 180 tablet 3    pioglitazone (Actos) 30 mg tablet TAKE 1 TABLET DAILY 90 tablet 1    simvastatin (Zocor) 10 mg tablet TAKE 1 TABLET DAILY 90 tablet 3    solifenacin (VESIcare) 5 mg tablet TAKE 1 TABLET DAILY 90 tablet 3    tirzepatide (Mounjaro) 5 mg/0.5 mL pen injector Inject 5 mg under the skin every 7 days. 6 mL 0    cimetidine (Tagamet) 800 mg tablet TAKE 1 TABLET DAILY AT BEDTIME 90 tablet 1    pen needle, diabetic (BD Ultra-Fine Vandana Pen Needle) 32 gauge x  "5/32\" needle Use one needle twice daily for insulin injections 200 each 3     No current facility-administered medications for this visit.          ROS:  The patient is awake and oriented. No dizziness or lightheadedness. No chills and no fever. No headaches. No nausea and no vomiting. No shortness of breath. No cough. No sputum. No chest pain. No chest tightness. No abdominal pain. No diarrhea and no constipation. No hematemesis or hemoptysis. No hematuria. No rectal bleeding. No melena. No epistaxis. No urinary symptoms. No flank pain. No leg edema. No leg pain. No weakness. No itching. Overall, the rest of the review of systems is also negative.  12 point review of systems otherwise negative as stated in HPI.        Physical Examination:        Vitals:    11/13/24 1018   BP: 132/88   Pulse: 87     General: The patient is awake, oriented, and is not in any distress.  Head and Neck: Normocephalic. No periorbital edema.  Eyes: non-icteric  Respiratory: Symmetric air entry. Symmetric chest expansion.No respiratory distress.  Skin: No maculopapular rash.  Musculoskeletal: No peripheral edema in both left and right upper extremities.  No edema in either left or right lower extremities.  Neuro Exam: Speech is fluent. Moves extremities.    Imaging:  === 04/29/24 ===    US RENAL COMPLETE    - Impression -  Nonvisualization of the right kidney.    No left hydronephrosis.    Left renal mid aspect 3.7 cm simple cyst.    No focal urinary bladder abnormality identified.    MACRO:  None.    Signed by: Kun Dhaliwal 4/30/2024 5:27 PM  Dictation workstation:   [id number]       Blood Labs:  No results found for this or any previous visit (from the past 24 hours).   Lab Results   Component Value Date    PTH 42.9 11/05/2024    PROTUR NEGATIVE 03/12/2024    PHOS 3.4 08/10/2023      Lab Results   Component Value Date    GLUCOSE 122 (H) 11/05/2024    CALCIUM 8.8 11/05/2024     (L) 11/05/2024    K 4.4 11/05/2024    CO2 25 " 11/05/2024     11/05/2024    BUN 21 11/05/2024    CREATININE 1.60 (H) 11/05/2024         Assessment and Plan:    #1 chronic kidney disease stageII.  Last creatinine was 1.6. He has a single kidney.  Creatinine level is higher than his baseline.  Basic metabolic panel will be repeated.    2.  Hypertension.  Blood pressure is under control.  Continue the current medications.     3.  Diabetes.  No proteinuria.    4.  Vitamin D deficiency.  I renewed his ergocalciferol prescription.    I will see him in about 4 months for follow-up.        Dani Kothari MD  Senior Attending Physician  Director of Onco-Nephrology Program  Division of Nephrology & Hypertension  [facility]

## 2024-11-18 ENCOUNTER — LAB (OUTPATIENT)
Dept: LAB | Facility: LAB | Age: 69
End: 2024-11-18
Payer: MEDICARE

## 2024-11-18 ENCOUNTER — TELEPHONE (OUTPATIENT)
Dept: PRIMARY CARE | Facility: CLINIC | Age: 69
End: 2024-11-18

## 2024-11-18 DIAGNOSIS — Z79.4 TYPE 2 DIABETES MELLITUS WITH OTHER SPECIFIED COMPLICATION, WITH LONG-TERM CURRENT USE OF INSULIN: ICD-10-CM

## 2024-11-18 DIAGNOSIS — E11.69 TYPE 2 DIABETES MELLITUS WITH OTHER SPECIFIED COMPLICATION, WITH LONG-TERM CURRENT USE OF INSULIN: ICD-10-CM

## 2024-11-18 DIAGNOSIS — N18.31 STAGE 3A CHRONIC KIDNEY DISEASE (MULTI): ICD-10-CM

## 2024-11-18 DIAGNOSIS — I10 HTN (HYPERTENSION), BENIGN: ICD-10-CM

## 2024-11-18 LAB
ANION GAP SERPL CALC-SCNC: 13 MMOL/L (ref 10–20)
BUN SERPL-MCNC: 22 MG/DL (ref 6–23)
CALCIUM SERPL-MCNC: 9.4 MG/DL (ref 8.6–10.3)
CHLORIDE SERPL-SCNC: 99 MMOL/L (ref 98–107)
CO2 SERPL-SCNC: 25 MMOL/L (ref 21–32)
CREAT SERPL-MCNC: 1.63 MG/DL (ref 0.5–1.3)
EGFRCR SERPLBLD CKD-EPI 2021: 45 ML/MIN/1.73M*2
GLUCOSE SERPL-MCNC: 103 MG/DL (ref 74–99)
POTASSIUM SERPL-SCNC: 4.4 MMOL/L (ref 3.5–5.3)
SODIUM SERPL-SCNC: 133 MMOL/L (ref 136–145)

## 2024-11-18 PROCEDURE — 80048 BASIC METABOLIC PNL TOTAL CA: CPT

## 2024-11-18 PROCEDURE — 36415 COLL VENOUS BLD VENIPUNCTURE: CPT

## 2024-11-18 RX ORDER — METFORMIN HYDROCHLORIDE 1000 MG/1
1000 TABLET ORAL EVERY 12 HOURS
Qty: 180 TABLET | Refills: 1 | Status: SHIPPED | OUTPATIENT
Start: 2024-11-18

## 2024-11-18 RX ORDER — CANDESARTAN 32 MG/1
32 TABLET ORAL DAILY
Qty: 30 TABLET | Refills: 2 | Status: SHIPPED | OUTPATIENT
Start: 2024-11-18 | End: 2025-11-18

## 2024-11-18 NOTE — TELEPHONE ENCOUNTER
Rx Refill Request Telephone Encounter    Name:  Dalton Champion  :  [id number]  Medication Name:       candesartan (Atacand) 32 mg tablet   90 days  Specific Pharmacy location:      [facility] - [address] [address]  Phone: [phone]  Fax: [phone]       Date of last appointment:      Date of next appointment:  25

## 2024-12-10 ENCOUNTER — APPOINTMENT (OUTPATIENT)
Dept: PHARMACY | Facility: HOSPITAL | Age: 69
End: 2024-12-10
Payer: MEDICARE

## 2024-12-10 DIAGNOSIS — E11.9 TYPE 2 DIABETES MELLITUS WITHOUT COMPLICATION, WITHOUT LONG-TERM CURRENT USE OF INSULIN (MULTI): ICD-10-CM

## 2024-12-10 NOTE — PROGRESS NOTES
Patient is sent at the request of Addy John MD for my opinion regarding diabetes.  My recommendations below will be communicated back to the requesting provider by way of shared medical record.    Recommendations:   Decrease Humalog 75/25 from 16 to 12 units BID, call me if BG >130 in the AM for 1 week  Continue other medications  ________________________________________________________________________    Subjective   Past Medical History:  Patient Active Problem List   Diagnosis    Cervical radiculopathy, chronic    Lumbar radiculopathy    Change in bowel habits    Irregular bowel habits    Colon polyp    Type 2 diabetes mellitus without complication, with long-term current use of insulin (Multi)    Elevated TSH    Fecal incontinence    GERD (gastroesophageal reflux disease)    Hyperlipidemia    Lung nodule    Osteoarthritis of knee    Sciatica of right side    Basal cell carcinoma of skin of other part of trunk    Hemangioma of skin and subcutaneous tissue    Melanocytic nevi of trunk    Neoplasm of uncertain behavior of skin    Other hypertrophic disorders of the skin    Other melanin hyperpigmentation    Congenital absence of right kidney    Long-term insulin use (Multi)    Unilateral primary osteoarthritis, right knee    Class 1 obesity due to excess calories with serious comorbidity and body mass index (BMI) of 31.0 to 31.9 in adult    Osteoarthritis of multiple joints    Frequent urination    Stage 3 chronic kidney disease, unspecified whether stage 3a or 3b CKD (Multi)    Medication dose increased    Encounter to discuss test results    Pre-operative exam    Dietary counseling and surveillance    Total knee replacement status, left    HTN (hypertension), benign    Knee pain     Interim:  Dalton Champion is a 69 y.o. male presents today for follow up visit with endo PharmD for Type 2 Diabetes Mellitus. Last seen by myself (Arsh Hemphill) on 11/7/24 where Humalog 75/25 was decreased from 25 --> 20  units BID. Then saw endo on 11/13 where mounjaro was not adjusted d/t ADRs, but insulin was reduced from 20 --> 16 units BID.    Today the patient reports he is doing well, acknowledges the Dexcom is showing lows overnight. He states he double checks with fingersticks and that he is typically in the 70s and feels some are compression lows. Reports that he is proud of the improvement in his blood sugars. However is continuing to have bloating and burps with Mounjaro, that have been slowly improving, in addition to appetite suppression.    Diabetes Pharmacotherapy:    Pioglitazone 15mg daily   Metformin 1000mg BID  Jardiance 10 mg daily  Humalog 75/25 16-0-16-0   Takes ~20 minutes before eating  Will skip breakfast and breakfast dose a couple times per week  Mounjaro 5mg weekly (Sundays)  Has had for ~1 month  Has burps and bloating with it, improving      Social:  Current diet: on average, 1-2 meals per day, having appetite suppression  Breakfast - sometimes skips; if he does have he'll have 3 mini donuts  Lunch - sometimes skips or has a sandwich  Dinner - porkchops + sauerkraut + beans + 1/2 potato  Snack - popcorn, fruit   Fluids - water, coffee (black), diet pop    Allergies:  Iodinated contrast media and Iodine    Medication list:  Current Outpatient Medications   Medication Instructions    amitriptyline (ELAVIL) 25 mg, oral, Nightly    blood sugar diagnostic (FreeStyle Lite Strips) strip Use up to 4 times daily as needed for CGM failure    blood-glucose sensor (Dexcom G7 Sensor) device CHANGE EVERY 10 DAYS    candesartan (ATACAND) 32 mg, oral, Daily    cimetidine (TAGAMET) 800 mg, oral, Nightly    ergocalciferol (VITAMIN D-2) 50,000 Units, oral, Every 14 days    insulin lispro protamin-lispro (HumaLOG Mix 75-25 KwikPen) 100 unit/mL (75-25) injection INJECT 35 UNITS IN THE MORNING AND 35 UNITS IN THE EVENING as directed    Jardiance 10 mg, oral, Daily    metFORMIN (GLUCOPHAGE) 1,000 mg, oral, Every 12 hours     "Mounjaro 5 mg, subcutaneous, Every 7 days    pen needle, diabetic (BD Ultra-Fine Vandana Pen Needle) 32 gauge x 5/32\" needle Use one needle twice daily for insulin injections    pioglitazone (ACTOS) 30 mg, oral, Daily    simvastatin (ZOCOR) 10 mg, oral, Daily    solifenacin (VESICARE) 5 mg, oral, Daily        Objective   Last Recorded Vitals:  BP Readings from Last 3 Encounters:   11/13/24 132/88   10/02/24 120/67   09/19/24 154/76     Wt Readings from Last 3 Encounters:   11/13/24 94.1 kg (207 lb 6.4 oz)   10/02/24 94.8 kg (209 lb)   05/17/24 92.5 kg (204 lb)     BMI Readings from Last 1 Encounters:   11/13/24 33.48 kg/m²      Labs  A1C  Lab Results   Component Value Date    HGBA1C 6.7 (H) 11/05/2024    HGBA1C 6.8 (A) 10/02/2024    HGBA1C 8.3 (H) 03/12/2024     BMP/LFTs  Lab Results   Component Value Date    CREATININE 1.63 (H) 11/18/2024    CREATININE 1.60 (H) 11/05/2024    CREATININE 1.33 (H) 04/29/2024    EGFR 45 (L) 11/18/2024    EGFR 46 (L) 11/05/2024    EGFR 57 (L) 04/29/2024    GLUCOSE 103 (H) 11/18/2024     (L) 11/18/2024    K 4.4 11/18/2024    CL 99 11/18/2024    CALCIUM 9.4 11/18/2024    CO2 25 11/18/2024    BUN 22 11/18/2024    ALT 14 11/05/2024    AST 23 11/05/2024    ALKPHOS 68 11/05/2024    BILITOT 0.4 11/05/2024     Lipids  Lab Results   Component Value Date    TRIG 203 (H) 11/05/2024    CHOL 127 11/05/2024    LDLF 68 08/10/2023    LDLCALC 46 11/05/2024    HDL 40.7 11/05/2024     Urine Albumin Creatinine Ratio  Lab Results   Component Value Date    MICROALBCREA 21.9 03/12/2024    MICROALBCREA 80.7 (H) 03/01/2023    MICROALBCREA 18.0 05/24/2019       Home glucose monitoring:  Hypoglycemia:  8% TBR, states that he does not have symptoms and he does a confirmatory check and it is sometimes in the 70s           Assessment/Plan   Type 2 Diabetes Mellitus  Goal A1C <7%, at goal as of 11/2024. CGM data showing very well controlled TIR and GMI, however pt is having frequent hypoglycemia. Pt states these " are not concerning to him because not all of them are actually <70, however given frequency and age of pt need to decrease insulin today. Additionally, pt has been able to reduce insulin significantly d/t Mounjaro and is now skipping some morning doses d/t skipping breakfast, may consider switching off mixed insulin to just basal insulin at next visit. Will not adjust Mounjaro today d/t side effects but they have been improving with time. Pt's eGFR is hovering around 45, if this drops any lower will need to decrease metformin, could consider compensating for this by increasing jardiance.  Plan:  Decrease Humalog 75/25 from 16 to 12 units BID, call me if BG >130 in the AM for 1 week  Continue other medications  Home glucose monitoring:   Will continue Dexcom G7  A minimum of 72 hours of CGM data was reviewed and used to make therapy changes.   Education Provided to Patient:   Rule of 15   Glycemic goals  Primary prevention:   Therapy: Moderate intensity statin   LDL result meets goal (11/2024)  Renal:  CKD: stage 3 - GFR 30-59  ACR: <30 (3/2024)  Renal protective agents: ACEi/ARB, SGLT2i, and GLP1  Follows with nephrology  DM medications are dosed appropriately for renal function  Labs: up to date   PharmD follow-up: 1 month  Endo follow-up: 3/12/25    Patient agreeable to plan as above, contact information provided for any future questions or concerns.    Arsh Hemphill, PharmBRETT    Type of encounter: virtual    Continue all meds under the continuation of care with the referring provider and clinical pharmacy team.

## 2024-12-10 NOTE — PATIENT INSTRUCTIONS
Decrease insulin to 12 units twice daily  Please call me if blood sugars in the morning increase to more than 130 for a week  Continue all other medications  If you have any questions or concerns, call me at: [phone]

## 2024-12-18 DIAGNOSIS — K21.9 GASTROESOPHAGEAL REFLUX DISEASE, UNSPECIFIED WHETHER ESOPHAGITIS PRESENT: ICD-10-CM

## 2024-12-21 RX ORDER — CIMETIDINE 800 MG/1
800 TABLET, FILM COATED ORAL NIGHTLY
Qty: 90 TABLET | Refills: 3 | Status: SHIPPED | OUTPATIENT
Start: 2024-12-21

## 2025-01-02 DIAGNOSIS — E11.9 TYPE 2 DIABETES MELLITUS WITHOUT COMPLICATION, WITHOUT LONG-TERM CURRENT USE OF INSULIN (MULTI): ICD-10-CM

## 2025-01-02 RX ORDER — TIRZEPATIDE 5 MG/.5ML
INJECTION, SOLUTION SUBCUTANEOUS
Qty: 6 ML | Refills: 3 | OUTPATIENT
Start: 2025-01-02

## 2025-01-03 DIAGNOSIS — I10 HTN (HYPERTENSION), BENIGN: ICD-10-CM

## 2025-01-03 RX ORDER — CANDESARTAN 32 MG/1
32 TABLET ORAL DAILY
Qty: 90 TABLET | Refills: 0 | Status: SHIPPED | OUTPATIENT
Start: 2025-01-03 | End: 2026-01-03

## 2025-01-06 ENCOUNTER — APPOINTMENT (OUTPATIENT)
Dept: PRIMARY CARE | Facility: CLINIC | Age: 70
End: 2025-01-06
Payer: MEDICARE

## 2025-01-14 ENCOUNTER — APPOINTMENT (OUTPATIENT)
Dept: PHARMACY | Facility: HOSPITAL | Age: 70
End: 2025-01-14
Payer: MEDICARE

## 2025-01-14 DIAGNOSIS — E11.9 TYPE 2 DIABETES MELLITUS WITHOUT COMPLICATION, WITHOUT LONG-TERM CURRENT USE OF INSULIN (MULTI): ICD-10-CM

## 2025-01-14 RX ORDER — TIRZEPATIDE 5 MG/.5ML
5 INJECTION, SOLUTION SUBCUTANEOUS
Qty: 6 ML | Refills: 3 | Status: SHIPPED | OUTPATIENT
Start: 2025-01-14

## 2025-01-14 NOTE — PROGRESS NOTES
Patient is sent at the request of Addy John MD for my opinion regarding diabetes.  My recommendations below will be communicated back to the requesting provider by way of shared medical record.    Recommendations:   No medication changes today  ________________________________________________________________________    Subjective   Past Medical History:  Patient Active Problem List   Diagnosis    Cervical radiculopathy, chronic    Lumbar radiculopathy    Change in bowel habits    Irregular bowel habits    Colon polyp    Type 2 diabetes mellitus without complication, with long-term current use of insulin (Multi)    Elevated TSH    Fecal incontinence    GERD (gastroesophageal reflux disease)    Hyperlipidemia    Lung nodule    Osteoarthritis of knee    Sciatica of right side    Basal cell carcinoma of skin of other part of trunk    Hemangioma of skin and subcutaneous tissue    Melanocytic nevi of trunk    Neoplasm of uncertain behavior of skin    Other hypertrophic disorders of the skin    Other melanin hyperpigmentation    Congenital absence of right kidney    Long-term insulin use (Multi)    Unilateral primary osteoarthritis, right knee    Class 1 obesity due to excess calories with serious comorbidity and body mass index (BMI) of 31.0 to 31.9 in adult    Osteoarthritis of multiple joints    Frequent urination    Stage 3 chronic kidney disease, unspecified whether stage 3a or 3b CKD (Multi)    Medication dose increased    Encounter to discuss test results    Pre-operative exam    Dietary counseling and surveillance    Total knee replacement status, left    HTN (hypertension), benign    Knee pain     Interim:  Dalton Champion is a 69 y.o. male presents today for follow up visit with endo [facility] for Type 2 Diabetes Mellitus. Last seen by myself (Arsh Hemphill) on 12/10/24 where Humalog 75/25 was reduced from 16 units BID to 12 units BID d/t lows.    Today the patient reports the lows have resolved since  "cutting back on his insulin, but states he continues to only take the morning dose of insulin ~50% of the time d/t frequently not eating breakfast or lunch. Despite this, blood sugars have continued to look very well controlled. Is still experiencing some mild ADRs with Mounjaro but they are tolerable and have improved over time. Of note, pt did mention he is interested in potentially cutting down on medications in the future.    Diabetes Pharmacotherapy:    Pioglitazone 30mg daily   Metformin 1000mg BID  Jardiance 10 mg daily  Humalog 75/25 12-0-12-0   Takes ~20 minutes before eating  Will skip breakfast and breakfast dose about half the time  Mounjaro 5mg weekly (Sundays)  Has burps and bloating for 24 - 48 hours        Social:  Current diet: on average, 1-2 meals per day, having some appetite suppression  Breakfast - sometimes skips; if he does have he'll have 3 mini donuts  Lunch - sometimes skips or has a sandwich  Dinner - porkchops + sauerkraut + beans + 1/2 potato  Snack - popcorn, fruit   Fluids - water, coffee (black), diet pop      Allergies:  Iodinated contrast media and Iodine    Medication list:  Current Outpatient Medications   Medication Instructions    amitriptyline (ELAVIL) 25 mg, oral, Nightly    blood sugar diagnostic (FreeStyle Lite Strips) strip Use up to 4 times daily as needed for CGM failure    blood-glucose sensor (Dexcom G7 Sensor) device CHANGE EVERY 10 DAYS    candesartan (ATACAND) 32 mg, oral, Daily    cimetidine (TAGAMET) 800 mg, oral, Nightly    ergocalciferol (VITAMIN D-2) 50,000 Units, oral, Every 14 days    insulin lispro protamin-lispro (HumaLOG Mix 75-25 KwikPen) 100 unit/mL (75-25) injection INJECT 35 UNITS IN THE MORNING AND 35 UNITS IN THE EVENING as directed    Jardiance 10 mg, oral, Daily    metFORMIN (GLUCOPHAGE) 1,000 mg, oral, Every 12 hours    Mounjaro 5 mg, subcutaneous, Every 7 days    pen needle, diabetic (BD Ultra-Fine Vandana Pen Needle) 32 gauge x 5/32\" needle Use one " "needle twice daily for insulin injections    pioglitazone (ACTOS) 30 mg, oral, Daily    simvastatin (ZOCOR) 10 mg, oral, Daily    solifenacin (VESICARE) 5 mg, oral, Daily        Objective   Last Recorded Vitals:  BP Readings from Last 3 Encounters:   11/13/24 132/88   10/02/24 120/67   09/19/24 154/76     Wt Readings from Last 3 Encounters:   11/13/24 94.1 kg (207 lb 6.4 oz)   10/02/24 94.8 kg (209 lb)   05/17/24 92.5 kg (204 lb)     BMI Readings from Last 1 Encounters:   11/13/24 33.48 kg/m²      Labs  A1C  Lab Results   Component Value Date    HGBA1C 6.7 (H) 11/05/2024    HGBA1C 6.8 (A) 10/02/2024    HGBA1C 8.3 (H) 03/12/2024     BMP/LFTs  Lab Results   Component Value Date    CREATININE 1.63 (H) 11/18/2024    CREATININE 1.60 (H) 11/05/2024    CREATININE 1.33 (H) 04/29/2024    EGFR 45 (L) 11/18/2024    EGFR 46 (L) 11/05/2024    EGFR 57 (L) 04/29/2024    GLUCOSE 103 (H) 11/18/2024     (L) 11/18/2024    K 4.4 11/18/2024    CL 99 11/18/2024    CALCIUM 9.4 11/18/2024    CO2 25 11/18/2024    BUN 22 11/18/2024    ALT 14 11/05/2024    AST 23 11/05/2024    ALKPHOS 68 11/05/2024    BILITOT 0.4 11/05/2024     Lipids  Lab Results   Component Value Date    TRIG 203 (H) 11/05/2024    CHOL 127 11/05/2024    LDLF 68 08/10/2023    LDLCALC 46 11/05/2024    HDL 40.7 11/05/2024     Urine Albumin Creatinine Ratio  Lab Results   Component Value Date    MICROALBCREA 21.9 03/12/2024    MICROALBCREA 80.7 (H) 03/01/2023    MICROALBCREA 18.0 05/24/2019     ASCVD risk  The ASCVD Risk score (Ward DK, et al., 2019) failed to calculate for the following reasons:    The valid total cholesterol range is 130 to 320 mg/dL    Additional labs:  No results found for: \"FRUCTOSAMINE\", \"CPEPTIDE\", \"AIX04GV\", \"NTIB\", \"ZNT8A\", \"INSAB\"    Home glucose monitoring:  Hypoglycemia:  much improved and now only 1% TBR        Assessment/Plan   Type 2 Diabetes Mellitus  Goal A1C <7%, at goal as of 11/2024. CGM data showing very well controlled TIR and GMI " and now only having rare hypoglycemia. Is happy with regimen, but does report he has some interest in cutting down medications. Discussed options w/ patient, prefer to continue Mounjaro dose for now given mild ADRs, but could consider reducing Metformin given borderline renal function. After discussing this with the pt he preferred to continue w/o changes for now, but does have endo and nephro follow up in March. Will continue current doses for now per pt prefeference.  Plan:  Continue current medications  Home glucose monitoring:   Will continue Dexcom G7  A minimum of 72 hours of CGM data was reviewed and used to make therapy changes.   Education Provided to Patient:   Rule of 15   Glycemic goals  Primary prevention:   Therapy: Moderate intensity statin   LDL result meets goal (11/2024)  Renal:  CKD: stage 3 - GFR 30-59  ACR: <30 (3/2024)  Renal protective agents: ACEi/ARB, SGLT2i, and GLP1  Follows with nephrology  DM medications are dosed appropriately for renal function  Labs: up to date   PharmD follow-up: 1 month  Endo follow-up: 3/12/25    Patient agreeable to plan as above, contact information provided for any future questions or concerns.    Arsh Hemphill, PharmD    Type of encounter: virtual    Continue all meds under the continuation of care with the referring provider and clinical pharmacy team.

## 2025-01-24 ENCOUNTER — APPOINTMENT (OUTPATIENT)
Dept: PRIMARY CARE | Facility: CLINIC | Age: 70
End: 2025-01-24
Payer: MEDICARE

## 2025-01-24 VITALS
DIASTOLIC BLOOD PRESSURE: 78 MMHG | HEART RATE: 98 BPM | SYSTOLIC BLOOD PRESSURE: 124 MMHG | BODY MASS INDEX: 28.95 KG/M2 | HEIGHT: 68 IN | WEIGHT: 191 LBS

## 2025-01-24 DIAGNOSIS — Z00.00 ROUTINE GENERAL MEDICAL EXAMINATION AT HEALTH CARE FACILITY: Primary | ICD-10-CM

## 2025-01-24 DIAGNOSIS — Z79.4 TYPE 2 DIABETES MELLITUS WITHOUT COMPLICATION, WITH LONG-TERM CURRENT USE OF INSULIN (MULTI): ICD-10-CM

## 2025-01-24 DIAGNOSIS — E11.9 TYPE 2 DIABETES MELLITUS WITHOUT COMPLICATION, WITH LONG-TERM CURRENT USE OF INSULIN (MULTI): ICD-10-CM

## 2025-01-24 DIAGNOSIS — M25.532 LEFT WRIST PAIN: ICD-10-CM

## 2025-01-24 PROCEDURE — 1123F ACP DISCUSS/DSCN MKR DOCD: CPT | Performed by: FAMILY MEDICINE

## 2025-01-24 PROCEDURE — 1157F ADVNC CARE PLAN IN RCRD: CPT | Performed by: FAMILY MEDICINE

## 2025-01-24 PROCEDURE — 99214 OFFICE O/P EST MOD 30 MIN: CPT | Performed by: FAMILY MEDICINE

## 2025-01-24 PROCEDURE — 1159F MED LIST DOCD IN RCRD: CPT | Performed by: FAMILY MEDICINE

## 2025-01-24 PROCEDURE — 3078F DIAST BP <80 MM HG: CPT | Performed by: FAMILY MEDICINE

## 2025-01-24 PROCEDURE — 3074F SYST BP LT 130 MM HG: CPT | Performed by: FAMILY MEDICINE

## 2025-01-24 PROCEDURE — 1170F FXNL STATUS ASSESSED: CPT | Performed by: FAMILY MEDICINE

## 2025-01-24 PROCEDURE — 1158F ADVNC CARE PLAN TLK DOCD: CPT | Performed by: FAMILY MEDICINE

## 2025-01-24 PROCEDURE — 3008F BODY MASS INDEX DOCD: CPT | Performed by: FAMILY MEDICINE

## 2025-01-24 PROCEDURE — G0439 PPPS, SUBSEQ VISIT: HCPCS | Performed by: FAMILY MEDICINE

## 2025-01-24 PROCEDURE — 4010F ACE/ARB THERAPY RXD/TAKEN: CPT | Performed by: FAMILY MEDICINE

## 2025-01-24 ASSESSMENT — ACTIVITIES OF DAILY LIVING (ADL)
GROCERY_SHOPPING: INDEPENDENT
BATHING: INDEPENDENT
TAKING_MEDICATION: INDEPENDENT
DOING_HOUSEWORK: INDEPENDENT
DRESSING: INDEPENDENT
MANAGING_FINANCES: INDEPENDENT

## 2025-01-24 ASSESSMENT — ENCOUNTER SYMPTOMS
CHEST TIGHTNESS: 0
CHILLS: 0
FATIGUE: 0
SHORTNESS OF BREATH: 0
HEADACHES: 0
DIZZINESS: 0

## 2025-01-24 ASSESSMENT — PATIENT HEALTH QUESTIONNAIRE - PHQ9
SUM OF ALL RESPONSES TO PHQ9 QUESTIONS 1 AND 2: 0
1. LITTLE INTEREST OR PLEASURE IN DOING THINGS: NOT AT ALL
2. FEELING DOWN, DEPRESSED OR HOPELESS: NOT AT ALL

## 2025-01-24 NOTE — PROGRESS NOTES
"Subjective   Reason for Visit: Dalton Champion is an 69 y.o. male here for a Medicare Wellness visit.          Reviewed all medications by prescribing practitioner or clinical pharmacist (such as prescriptions, OTCs, herbal therapies and supplements) and documented in the medical record.    Diabetes type II   - reports he has been on mounjaro for the last few months   - has been gradually making progress with the medication up to 5mg   - has had improvement in his side effects with time, and is tolerating the medication better   - has lost almost 20 pounds since October, reports he has less of an appetite   - no issues with dizziness, lightheadedness, increased thirst or increased urination   - current regimen includes metformin, jardiance, mounjaro and humalog mix   - sugars have generally been well controlled   - dose follow with endocrinology Dr. Nelda Nobles   - reports he has been dealing with some increased stress   - has lost the dog recently and it has caused some increased stress   - reports he is working through it     Chronic Kidney disease   - reports he follows with nephrology   - has been monitoring his CKD stage 3   - does take mounjaro and jardiance to help manage his blood sugars and his sugars are well controlled       Patient Care Team:  Emily Olvera MD as PCP - General (Internal Medicine)  Emily Olvera MD as PCP - [facility]  Dani Kothari MD as Nephrologist (Nephrology)     Review of Systems   Constitutional:  Negative for chills and fatigue.   Respiratory:  Negative for chest tightness and shortness of breath.    Neurological:  Negative for dizziness and headaches.       Objective   Vitals:  /78   Pulse 98   Ht 1.715 m (5' 7.5\")   Wt 86.6 kg (191 lb)   BMI 29.47 kg/m²       Physical Exam  Constitutional:       General: He is not in acute distress.     Appearance: Normal appearance.   HENT:      Mouth/Throat:      Mouth: Mucous membranes are " moist.      Pharynx: Oropharynx is clear.   Cardiovascular:      Rate and Rhythm: Normal rate and regular rhythm.   Pulmonary:      Effort: No respiratory distress.      Breath sounds: No stridor. No wheezing.   Neurological:      Mental Status: He is alert.   Psychiatric:         Mood and Affect: Mood normal.         Behavior: Behavior normal.       Assessment & Plan  Routine general medical examination at health care facility  Stable  - discussed healthy lifestyle changes   - f/u 1 year     Orders:    1 Year Follow Up In Primary Care - Wellness Exam; Future    Type 2 diabetes mellitus without complication, with long-term current use of insulin (Multi)  Stable  - reviewed A1c today   - continue current medication regimen   - discussed dietary/lifestyle changes        Left wrist pain  Stable  - check XR to rule out acute fractures   - f/u PRN   Orders:    XR wrist left 1-2 views; Future           Depression Screening  5 - 10 minutes were spent screening for depression.

## 2025-01-24 NOTE — ASSESSMENT & PLAN NOTE
Stable  - reviewed A1c today   - continue current medication regimen   - discussed dietary/lifestyle changes

## 2025-01-31 ENCOUNTER — HOSPITAL ENCOUNTER (OUTPATIENT)
Dept: RADIOLOGY | Facility: CLINIC | Age: 70
Discharge: HOME | End: 2025-01-31
Payer: MEDICARE

## 2025-01-31 DIAGNOSIS — M25.532 LEFT WRIST PAIN: ICD-10-CM

## 2025-01-31 PROCEDURE — 73110 X-RAY EXAM OF WRIST: CPT | Mod: LT

## 2025-02-06 ENCOUNTER — APPOINTMENT (OUTPATIENT)
Dept: PRIMARY CARE | Facility: CLINIC | Age: 70
End: 2025-02-06
Payer: MEDICARE

## 2025-03-12 ENCOUNTER — APPOINTMENT (OUTPATIENT)
Dept: ENDOCRINOLOGY | Facility: CLINIC | Age: 70
End: 2025-03-12
Payer: MEDICARE

## 2025-03-12 VITALS
SYSTOLIC BLOOD PRESSURE: 100 MMHG | HEIGHT: 68 IN | DIASTOLIC BLOOD PRESSURE: 60 MMHG | BODY MASS INDEX: 27.89 KG/M2 | WEIGHT: 184 LBS

## 2025-03-12 DIAGNOSIS — N18.30 STAGE 3 CHRONIC KIDNEY DISEASE, UNSPECIFIED WHETHER STAGE 3A OR 3B CKD (MULTI): ICD-10-CM

## 2025-03-12 DIAGNOSIS — E11.9 TYPE 2 DIABETES MELLITUS WITHOUT COMPLICATION, WITH LONG-TERM CURRENT USE OF INSULIN (MULTI): Primary | ICD-10-CM

## 2025-03-12 DIAGNOSIS — Z79.4 TYPE 2 DIABETES MELLITUS WITHOUT COMPLICATION, WITH LONG-TERM CURRENT USE OF INSULIN (MULTI): Primary | ICD-10-CM

## 2025-03-12 DIAGNOSIS — E78.5 HYPERLIPIDEMIA, UNSPECIFIED HYPERLIPIDEMIA TYPE: ICD-10-CM

## 2025-03-12 LAB
POC FINGERSTICK BLOOD GLUCOSE: 109 MG/DL (ref 70–100)
POC HEMOGLOBIN A1C: 5.8 % (ref 4.2–6.5)

## 2025-03-12 PROCEDURE — 83036 HEMOGLOBIN GLYCOSYLATED A1C: CPT | Performed by: HOSPITALIST

## 2025-03-12 PROCEDURE — 1157F ADVNC CARE PLAN IN RCRD: CPT | Performed by: HOSPITALIST

## 2025-03-12 PROCEDURE — 99214 OFFICE O/P EST MOD 30 MIN: CPT | Performed by: HOSPITALIST

## 2025-03-12 PROCEDURE — 3074F SYST BP LT 130 MM HG: CPT | Performed by: HOSPITALIST

## 2025-03-12 PROCEDURE — 4010F ACE/ARB THERAPY RXD/TAKEN: CPT | Performed by: HOSPITALIST

## 2025-03-12 PROCEDURE — 1123F ACP DISCUSS/DSCN MKR DOCD: CPT | Performed by: HOSPITALIST

## 2025-03-12 PROCEDURE — 3008F BODY MASS INDEX DOCD: CPT | Performed by: HOSPITALIST

## 2025-03-12 PROCEDURE — 1159F MED LIST DOCD IN RCRD: CPT | Performed by: HOSPITALIST

## 2025-03-12 PROCEDURE — 95251 CONT GLUC MNTR ANALYSIS I&R: CPT | Performed by: HOSPITALIST

## 2025-03-12 PROCEDURE — 82962 GLUCOSE BLOOD TEST: CPT | Performed by: HOSPITALIST

## 2025-03-12 PROCEDURE — 3078F DIAST BP <80 MM HG: CPT | Performed by: HOSPITALIST

## 2025-03-12 RX ORDER — INSULIN GLARGINE 100 [IU]/ML
12 INJECTION, SOLUTION SUBCUTANEOUS
Qty: 10.8 ML | Refills: 3 | Status: SHIPPED | OUTPATIENT
Start: 2025-03-12 | End: 2026-03-12

## 2025-03-12 ASSESSMENT — ENCOUNTER SYMPTOMS
SHORTNESS OF BREATH: 0
CONSTIPATION: 0
CONSTITUTIONAL NEGATIVE: 1
PALPITATIONS: 0
SLEEP DISTURBANCE: 0
CHEST TIGHTNESS: 0
VOMITING: 0
NAUSEA: 0
TROUBLE SWALLOWING: 0
ABDOMINAL PAIN: 0
DIARRHEA: 0
PHOTOPHOBIA: 0
DYSURIA: 0
HEADACHES: 0
ARTHRALGIAS: 0
FREQUENCY: 0
AGITATION: 0
SORE THROAT: 0
NERVOUS/ANXIOUS: 0
VOICE CHANGE: 0
TREMORS: 0
LIGHT-HEADEDNESS: 0
EYE ITCHING: 0
ABDOMINAL DISTENTION: 0

## 2025-03-12 NOTE — PROGRESS NOTES
Subjective   Patient ID: Dalton Champion is a 70 y.o. male who presents for Diabetes ( (Dx DM: around 1999/H DM: father, grandparent, brother/PCP: price /Podiatry: does not see one /Patient testing glucose 4 times daily with dexcom G7-LINKED. Due to fluctuating blood glucose and hypoglycemia. Patient is adjusting insulin based on glucose readings at meals. Pt adhering and benefiting from cgm treatment plan.).   Lab Results   Component Value Date    HGBA1C 5.8 03/12/2025      HPI   See AP     Review of Systems   Constitutional: Negative.    HENT:  Negative for sore throat, trouble swallowing and voice change.    Eyes:  Negative for photophobia, itching and visual disturbance.   Respiratory:  Negative for chest tightness and shortness of breath.    Cardiovascular:  Negative for chest pain and palpitations.   Gastrointestinal:  Negative for abdominal distention, abdominal pain, constipation, diarrhea, nausea and vomiting.   Endocrine: Negative for cold intolerance, heat intolerance and polyuria.   Genitourinary:  Negative for dysuria and frequency.   Musculoskeletal:  Negative for arthralgias.   Skin:  Negative for pallor.   Allergic/Immunologic: Negative for environmental allergies.   Neurological:  Negative for tremors, light-headedness and headaches.   Psychiatric/Behavioral:  Negative for agitation and sleep disturbance. The patient is not nervous/anxious.        Objective   Physical Exam  Constitutional:       Appearance: Normal appearance.   HENT:      Head: Normocephalic.      Nose: Nose normal.      Mouth/Throat:      Mouth: Mucous membranes are moist.   Eyes:      Extraocular Movements: Extraocular movements intact.   Cardiovascular:      Rate and Rhythm: Normal rate.   Pulmonary:      Effort: Pulmonary effort is normal. No respiratory distress.   Abdominal:      General: There is no distension.   Musculoskeletal:         General: Normal range of motion.      Cervical back: Normal range of motion and neck  "supple.   Skin:     General: Skin is warm and dry.   Neurological:      Mental Status: He is alert and oriented to person, place, and time.   Psychiatric:         Mood and Affect: Mood normal.      Visit Vitals  /60   Ht 1.727 m (5' 8\")   Wt 83.5 kg (184 lb)   BMI 27.98 kg/m²   Smoking Status Former   BSA 2 m²        Assessment/Plan   Diagnoses and all orders for this visit:  Type 2 diabetes mellitus without complication, with long-term current use of insulin (Multi)  -     POCT glycosylated hemoglobin (Hb A1C) manually resulted  -     POCT fingerstick glucose manually resulted  -     insulin glargine (Lantus) 100 unit/mL (3 mL) pen; Inject 12 Units under the skin once daily in the evening. Take with meals. Sugar > 180 consistently increase to 16 units  Stage 3 chronic kidney disease, unspecified whether stage 3a or 3b CKD (Multi)  Hyperlipidemia, unspecified hyperlipidemia type         T2DM , uncontrolled   Dx DM: around 1999     Current regimen:   pioglitzaone 15mg qd   metformin 1000mg BID  Humalog 75/25 12-0-12-0  ( somedays takes only once )    Jardiance 10 mg daily ( started may 2023  ) denies any side effects   Mounjaro 5 mg once weekly ( started 10/ 2024 Bloating +, burping, feels arguello)      Downloaded Dexcom G7 reviewed and interpreted-   Active time 97%.  96% in range 2% low  Low blood sugars seems to be false when he checks on fingerstick      A1c 6.8 %     Knee arthroscopy-April 2024    Interval : History of mild constipation takes fiber helps.  Bloating +, burping      PLAN :    STOP mixed insulin    Start long acting insulin like basaglar/ lantus 12 units if sugar > 180 increase to 16 units    Clinical pharmacy referral to adjust insulin , will not increase mounjaro though at this time 0   Drinking m,ore water   - if GFR < 45  next visit will have to decrease/ stop metformin, last GFR 45   - continue pioglitazone, jardiance       Mounjaro.  Discussed mechanism of action side effects risks " including pancreatitis and cholecystitis.  Worsening constipation, dehydration  Continue JARDIANCE/ METFORMIN AND PIOGLITAZONE   - DIscussed hypoglycemia and the Management of hypoglycemia.   - on statin and ARB   Continue G7.  Discussed if any low blood sugar and he does not have any symptoms advised to check fingerstick to confirm      # abn TSH in past: clinically and biochemically euthyroid now     RTC 4-5 m    - lives in 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

## 2025-03-13 ENCOUNTER — APPOINTMENT (OUTPATIENT)
Dept: NEPHROLOGY | Facility: CLINIC | Age: 70
End: 2025-03-13
Payer: MEDICARE

## 2025-03-13 VITALS
HEART RATE: 80 BPM | BODY MASS INDEX: 28.01 KG/M2 | DIASTOLIC BLOOD PRESSURE: 63 MMHG | SYSTOLIC BLOOD PRESSURE: 120 MMHG | WEIGHT: 184.8 LBS | HEIGHT: 68 IN

## 2025-03-13 DIAGNOSIS — E08.22 DIABETES MELLITUS DUE TO UNDERLYING CONDITION WITH DIABETIC CHRONIC KIDNEY DISEASE, UNSPECIFIED CKD STAGE, UNSPECIFIED WHETHER LONG TERM INSULIN USE: ICD-10-CM

## 2025-03-13 DIAGNOSIS — I10 ESSENTIAL HYPERTENSION: ICD-10-CM

## 2025-03-13 DIAGNOSIS — N18.31 STAGE 3A CHRONIC KIDNEY DISEASE (MULTI): Primary | ICD-10-CM

## 2025-03-13 DIAGNOSIS — E55.9 VITAMIN D DEFICIENCY: ICD-10-CM

## 2025-03-13 PROCEDURE — 4010F ACE/ARB THERAPY RXD/TAKEN: CPT | Performed by: INTERNAL MEDICINE

## 2025-03-13 PROCEDURE — 3078F DIAST BP <80 MM HG: CPT | Performed by: INTERNAL MEDICINE

## 2025-03-13 PROCEDURE — 3074F SYST BP LT 130 MM HG: CPT | Performed by: INTERNAL MEDICINE

## 2025-03-13 PROCEDURE — 3008F BODY MASS INDEX DOCD: CPT | Performed by: INTERNAL MEDICINE

## 2025-03-13 PROCEDURE — 1036F TOBACCO NON-USER: CPT | Performed by: INTERNAL MEDICINE

## 2025-03-13 PROCEDURE — 1123F ACP DISCUSS/DSCN MKR DOCD: CPT | Performed by: INTERNAL MEDICINE

## 2025-03-13 PROCEDURE — 1157F ADVNC CARE PLAN IN RCRD: CPT | Performed by: INTERNAL MEDICINE

## 2025-03-13 PROCEDURE — 1159F MED LIST DOCD IN RCRD: CPT | Performed by: INTERNAL MEDICINE

## 2025-03-13 PROCEDURE — 99213 OFFICE O/P EST LOW 20 MIN: CPT | Performed by: INTERNAL MEDICINE

## 2025-03-13 RX ORDER — INSULIN LISPRO 100 [IU]/ML
INJECTION, SOLUTION INTRAVENOUS; SUBCUTANEOUS
COMMUNITY
End: 2025-03-13 | Stop reason: WASHOUT

## 2025-03-13 NOTE — PROGRESS NOTES
"Dalton GERBER Akira   70 y.o.    @@  [facility]/Room: [id number]/Room/bed info not found    Subjective:   The patient is being seen for a routine clinic follow-up of chronic kidney disease. Recently, the disease has been stable. Disease complications:  No hyperkalemia, no hypocalcemia, no hyperphosphatemia, no metabolic acidosis, no coagulopathy, no uremic encephalopathy, no neuropathy and no renal osteodystrophy. The patient is currently asymptomatic. No associated symptoms are reported.       Meds:   Current Outpatient Medications   Medication Sig Dispense Refill    amitriptyline (Elavil) 25 mg tablet TAKE 1 TABLET DAILY AT BEDTIME 90 tablet 1    blood sugar diagnostic (FreeStyle Lite Strips) strip Use up to 4 times daily as needed for CGM failure 100 each 3    blood-glucose sensor (Dexcom G7 Sensor) device CHANGE EVERY 10 DAYS 6 each 3    candesartan (Atacand) 32 mg tablet Take 1 tablet (32 mg) by mouth once daily. 90 tablet 0    cimetidine (Tagamet) 800 mg tablet TAKE 1 TABLET DAILY AT BEDTIME 90 tablet 3    empagliflozin (Jardiance) 10 mg TAKE 1 TABLET DAILY 90 tablet 1    ergocalciferol (Vitamin D-2) 1.25 MG (68274 UT) capsule Take 1 capsule (50,000 Units) by mouth every 14 (fourteen) days. 6 capsule 2    insulin glargine (Lantus) 100 unit/mL (3 mL) pen Inject 12 Units under the skin once daily in the evening. Take with meals. Sugar > 180 consistently increase to 16 units 10.8 mL 3    metFORMIN (Glucophage) 1,000 mg tablet TAKE 1 TABLET EVERY 12 HOURS 180 tablet 1    pen needle, diabetic (BD Ultra-Fine Vandana Pen Needle) 32 gauge x 5/32\" needle Use one needle twice daily for insulin injections 200 each 3    pioglitazone (Actos) 30 mg tablet TAKE 1 TABLET DAILY 90 tablet 1    simvastatin (Zocor) 10 mg tablet Take 1 tablet (10 mg) by mouth once daily. 90 tablet 3    solifenacin (VESIcare) 5 mg tablet TAKE 1 TABLET DAILY 90 tablet 3    tirzepatide (Mounjaro) 5 mg/0.5 mL pen injector Inject 5 mg under the skin every 7 days. 6 " mL 3     No current facility-administered medications for this visit.          ROS:  The patient is awake and oriented. No dizziness or lightheadedness. No chills and no fever. No headaches. No nausea and no vomiting. No shortness of breath. No cough. No chest pain. No abdominal pain. No diarrhea. No hematemesis or hemoptysis. No hematuria. No rectal bleeding. No melena. No epistaxis. No urinary symptoms. No flank pain. No leg edema. No itching. Overall, the rest of the review of systems is also negative.  12 point review of systems otherwise negative as stated in HPI.        Physical Examination:        Vitals:    03/13/25 1001   BP: 120/63   Pulse: 80     General: The patient is awake, oriented, and is not in any distress.  Head and Neck: Normocephalic. No periorbital edema.  Eyes: non-icteric  Respiratory: Symmetric chest expansion. No respiratory distress.  Skin: No maculopapular rash.  Musculoskeletal: No peripheral edema.  Neuro Exam: Speech is fluent. Moves extremities.    Imaging:  === 04/29/24 ===    US RENAL COMPLETE    - Impression -  Nonvisualization of the right kidney.    No left hydronephrosis.    Left renal mid aspect 3.7 cm simple cyst.    No focal urinary bladder abnormality identified.    MACRO:  None.    Signed by: Kun Dhaliwal 4/30/2024 5:27 PM  Dictation workstation:   [id number]       Blood Labs:  No results found for this or any previous visit (from the past 24 hours).   Lab Results   Component Value Date    PTH 42.9 11/05/2024    PROTUR NEGATIVE 03/12/2024    PHOS 3.4 08/10/2023      Lab Results   Component Value Date    GLUCOSE 103 (H) 11/18/2024    CALCIUM 9.4 11/18/2024     (L) 11/18/2024    K 4.4 11/18/2024    CO2 25 11/18/2024    CL 99 11/18/2024    BUN 22 11/18/2024    CREATININE 1.63 (H) 11/18/2024         Assessment and Plan:  #1 chronic kidney disease stageII.  Last creatinine from November 2024 is 1.63. He has a single kidney.  I asked for a basic metabolic panel to be  done today.    2.  Hypertension.  Blood pressure is under control.  Continue the current medications.     3.  Diabetes.  I asked for a spot urine protein to creatinine ratio to be done today.     4.  Vitamin D deficiency.  He took a course of ergocalciferol.  25-hydroxy vitamin D level will be checked today.     I will see him in about 4 months for follow-up.          Dani Kothari MD  Senior Attending Physician  Director of Onco-Nephrology Program  Division of Nephrology & Hypertension  [facility]

## 2025-03-14 LAB
25(OH)D3+25(OH)D2 SERPL-MCNC: 57 NG/ML (ref 30–100)
ANION GAP SERPL CALCULATED.4IONS-SCNC: 11 MMOL/L (CALC) (ref 7–17)
BUN SERPL-MCNC: 25 MG/DL (ref 7–25)
BUN/CREAT SERPL: 16 (CALC) (ref 6–22)
CALCIUM SERPL-MCNC: 9.2 MG/DL (ref 8.6–10.3)
CHLORIDE SERPL-SCNC: 106 MMOL/L (ref 98–110)
CO2 SERPL-SCNC: 21 MMOL/L (ref 20–32)
CREAT SERPL-MCNC: 1.55 MG/DL (ref 0.7–1.28)
CREAT UR-MCNC: 69 MG/DL (ref 20–320)
EGFRCR SERPLBLD CKD-EPI 2021: 47 ML/MIN/1.73M2
GLUCOSE SERPL-MCNC: 133 MG/DL (ref 65–139)
POTASSIUM SERPL-SCNC: 4.5 MMOL/L (ref 3.5–5.3)
PROT UR-MCNC: 7 MG/DL (ref 5–25)
PROT/CREAT UR: 0.1 MG/MG CREAT (ref 0.03–0.15)
PROT/CREAT UR: 101 MG/G CREAT (ref 25–148)
PTH-INTACT SERPL-MCNC: 43 PG/ML (ref 16–77)
SODIUM SERPL-SCNC: 138 MMOL/L (ref 135–146)

## 2025-03-27 DIAGNOSIS — E11.9 TYPE 2 DIABETES MELLITUS WITHOUT COMPLICATION, WITHOUT LONG-TERM CURRENT USE OF INSULIN: ICD-10-CM

## 2025-03-28 RX ORDER — EMPAGLIFLOZIN 10 MG/1
10 TABLET, FILM COATED ORAL DAILY
Qty: 90 TABLET | Refills: 1 | Status: SHIPPED | OUTPATIENT
Start: 2025-03-28

## 2025-03-31 DIAGNOSIS — I10 HTN (HYPERTENSION), BENIGN: ICD-10-CM

## 2025-03-31 DIAGNOSIS — Z79.4 DIABETES MELLITUS TYPE 2, INSULIN DEPENDENT (MULTI): ICD-10-CM

## 2025-03-31 DIAGNOSIS — E11.9 TYPE 2 DIABETES MELLITUS WITHOUT COMPLICATION, WITH LONG-TERM CURRENT USE OF INSULIN: ICD-10-CM

## 2025-03-31 DIAGNOSIS — Z79.4 TYPE 2 DIABETES MELLITUS WITHOUT COMPLICATION, WITH LONG-TERM CURRENT USE OF INSULIN: ICD-10-CM

## 2025-03-31 DIAGNOSIS — E11.9 DIABETES MELLITUS TYPE 2, INSULIN DEPENDENT (MULTI): ICD-10-CM

## 2025-04-01 RX ORDER — CANDESARTAN 32 MG/1
32 TABLET ORAL DAILY
Qty: 90 TABLET | Refills: 1 | Status: SHIPPED | OUTPATIENT
Start: 2025-04-01 | End: 2026-04-01

## 2025-04-01 RX ORDER — PIOGLITAZONEHYDROCHLORIDE 30 MG/1
30 TABLET ORAL DAILY
Qty: 90 TABLET | Refills: 1 | Status: SHIPPED | OUTPATIENT
Start: 2025-04-01

## 2025-05-13 ENCOUNTER — APPOINTMENT (OUTPATIENT)
Dept: PHARMACY | Facility: HOSPITAL | Age: 70
End: 2025-05-13
Payer: MEDICARE

## 2025-05-29 DIAGNOSIS — E11.69 TYPE 2 DIABETES MELLITUS WITH OTHER SPECIFIED COMPLICATION, WITH LONG-TERM CURRENT USE OF INSULIN: ICD-10-CM

## 2025-05-29 DIAGNOSIS — Z79.4 TYPE 2 DIABETES MELLITUS WITH OTHER SPECIFIED COMPLICATION, WITH LONG-TERM CURRENT USE OF INSULIN: ICD-10-CM

## 2025-05-29 RX ORDER — METFORMIN HYDROCHLORIDE 1000 MG/1
1000 TABLET ORAL EVERY 12 HOURS
Qty: 180 TABLET | Refills: 0 | Status: SHIPPED | OUTPATIENT
Start: 2025-05-29

## 2025-06-02 NOTE — PROGRESS NOTES
Patient is sent at the request of Addy John MD for my opinion regarding diabetes.  My recommendations below will be communicated back to the requesting provider by way of shared medical record.    Recommendations:   Decrease Metformin to 1000mg daily  Continue all other medications  ________________________________________________________________________    Subjective   Past Medical History:  Problem List[1]      HPI:  Dalton Champion is a 70 y.o. male with a PMH significant for T2DM, CKD, HTN, and HLD  Pt presents today for follow up visit with vamshi Cerrato for Type 2 Diabetes Mellitus  Last seen by myself (Arsh Hemphill) on 1/14/25 where no changes were made  In the interim:  Saw vamshi on 3/12 where humalog 75/25 switched to Lantus 12 units once daily  Missed 5/13 visit w/ PharmD    Today:   Pt doing well today, denies acute concerns  Tolerating current medications well  No low blood sugars reported  Has noticed some weight loss d/t looser fitting clothes    Diabetes Pharmacotherapy:    Pioglitazone 30mg daily   Metformin 1000mg BID  Jardiance 10 mg daily  Lantus 12 units once daily  Mounjaro 5mg weekly (Sundays)  Has burps however states that this has gotten a lot better  Feels this improved d/t cutting down on pop    Previously trialed meds:   Humalog 75/25 - switched to Lantus    Social:  Current diet: on average, 1-2 meals per day, having some appetite suppression, states similar to last appt:  Breakfast - sometimes skips; if he does have he'll have 3 mini donuts  Lunch - sometimes skips or has a sandwich  Dinner - porkchops + sauerkraut + beans + 1/2 potato  Snack - popcorn, fruit   Fluids - water, coffee (black)      Allergies:  Iodinated contrast media and Iodine    Medication list:  Current Outpatient Medications   Medication Instructions    amitriptyline (ELAVIL) 25 mg, oral, Nightly    blood sugar diagnostic (FreeStyle Lite Strips) strip Use up to 4 times daily as needed for CGM failure     "blood-glucose sensor (Dexcom G7 Sensor) device CHANGE EVERY 10 DAYS    candesartan (ATACAND) 32 mg, oral, Daily    cimetidine (TAGAMET) 800 mg, oral, Nightly    ergocalciferol (VITAMIN D-2) 50,000 Units, oral, Every 14 days    insulin glargine (LANTUS) 12 Units, subcutaneous, Daily with evening meal, Sugar > 180 consistently increase to 16 units    Jardiance 10 mg, oral, Daily    metFORMIN (GLUCOPHAGE) 1,000 mg, oral, Every 12 hours    Mounjaro 5 mg, subcutaneous, Every 7 days    pen needle, diabetic (BD Ultra-Fine Vandana Pen Needle) 32 gauge x 5/32\" needle Use one needle twice daily for insulin injections    pioglitazone (ACTOS) 30 mg, oral, Daily    simvastatin (ZOCOR) 10 mg, oral, Daily    solifenacin (VESICARE) 5 mg, oral, Daily        Objective   Last Recorded Vitals:  BP Readings from Last 3 Encounters:   03/13/25 120/63   03/12/25 100/60   01/24/25 124/78     Wt Readings from Last 3 Encounters:   03/13/25 83.8 kg (184 lb 12.8 oz)   03/12/25 83.5 kg (184 lb)   01/24/25 86.6 kg (191 lb)     BMI Readings from Last 1 Encounters:   03/13/25 28.10 kg/m²      Labs  A1C  Lab Results   Component Value Date    HGBA1C 5.8 03/12/2025    HGBA1C 6.7 (H) 11/05/2024    HGBA1C 6.8 (A) 10/02/2024     BMP/LFTs  Lab Results   Component Value Date    CREATININE 1.55 (H) 03/13/2025    CREATININE 1.63 (H) 11/18/2024    CREATININE 1.60 (H) 11/05/2024    EGFR 47 (L) 03/13/2025    EGFR 45 (L) 11/18/2024    EGFR 46 (L) 11/05/2024    GLUCOSE 133 03/13/2025     03/13/2025    K 4.5 03/13/2025     03/13/2025    CALCIUM 9.2 03/13/2025    CO2 21 03/13/2025    BUN 25 03/13/2025    ALT 14 11/05/2024    AST 23 11/05/2024    ALKPHOS 68 11/05/2024    BILITOT 0.4 11/05/2024     Lipids  Lab Results   Component Value Date    TRIG 203 (H) 11/05/2024    CHOL 127 11/05/2024    LDLF 68 08/10/2023    LDLCALC 46 11/05/2024    HDL 40.7 11/05/2024     Urine Albumin Creatinine Ratio  Lab Results   Component Value Date    MICROALBCREA 21.9 " "03/12/2024    MICROALBCREA 80.7 (H) 03/01/2023    MICROALBCREA 18.0 05/24/2019     ASCVD risk  The ASCVD Risk score (Linda ROONEY, et al., 2019) failed to calculate for the following reasons:    The valid total cholesterol range is 130 to 320 mg/dL    Additional labs:  No results found for: \"FRUCTOSAMINE\", \"CPEPTIDE\", \"CDC31GU\", \"NTIB\", \"ZNT8A\", \"INSAB\"    Home glucose monitoring:  Hypoglycemia: denies readings <70 mg/dL or s/sx of hypoglycemia        Assessment/Plan   Type 2 Diabetes Mellitus  Goal A1C <7%, at goal as of 11/2024. CGM data showing very well controlled TIR and GMI and no hypoglycemia noted. Is happy with regimen, but does report he has some interest in cutting down medications. Pt also reports resolution of ADRs with Mounjaro, could consider increasng and reducing/stopping insulin, pt intereted but will talk with endo about this at next appt. Given level of control and borderline renal function will decrease metformin today  Plan:  Decrease Metformin to 1000mg once daily  Continue all other medications  Home glucose monitoring:   Will continue Dexcom G7  A minimum of 72 hours of CGM data was reviewed and used to make therapy changes.   Education Provided to Patient:   Rule of 15   Glycemic goals  Primary prevention:   Therapy: Moderate intensity statin   LDL result meets goal (11/2024)  Renal:  Follows with nephrology  CKD: stage 3 - GFR 30-59  ACR: <30 (3/2024)  Renal protective agents: ACEi/ARB, SGLT2i, and GLP1  Follows with nephrology  DM medications are dosed appropriately for renal function  Labs: up to date   PharmD follow-up: deferred given level of control, endo can place referral to PharmD again if deemed appropriate  Endo follow-up: 8/27/25    Patient agreeable to plan as above, contact information provided for any future questions or concerns.    Arsh Hemphill, PharmD    Type of encounter: virtual    Continue all meds under the continuation of care with the referring provider and " clinical pharmacy team.           [1]   Patient Active Problem List  Diagnosis    Cervical radiculopathy, chronic    Lumbar radiculopathy    Change in bowel habits    Irregular bowel habits    Colon polyp    Type 2 diabetes mellitus without complication, with long-term current use of insulin    Elevated TSH    Fecal incontinence    GERD (gastroesophageal reflux disease)    Hyperlipidemia    Lung nodule    Osteoarthritis of knee    Sciatica of right side    Basal cell carcinoma of skin of other part of trunk    Hemangioma of skin and subcutaneous tissue    Melanocytic nevi of trunk    Neoplasm of uncertain behavior of skin    Other hypertrophic disorders of the skin    Other melanin hyperpigmentation    Congenital absence of right kidney    Long-term insulin use (Multi)    Unilateral primary osteoarthritis, right knee    Class 1 obesity due to excess calories with serious comorbidity and body mass index (BMI) of 31.0 to 31.9 in adult    Osteoarthritis of multiple joints    Frequent urination    Stage 3 chronic kidney disease, unspecified whether stage 3a or 3b CKD (Multi)    Medication dose increased    Encounter to discuss test results    Pre-operative exam    Dietary counseling and surveillance    Total knee replacement status, left    HTN (hypertension), benign    Knee pain

## 2025-06-03 ENCOUNTER — APPOINTMENT (OUTPATIENT)
Dept: PHARMACY | Facility: HOSPITAL | Age: 70
End: 2025-06-03
Payer: MEDICARE

## 2025-06-03 DIAGNOSIS — E11.9 TYPE 2 DIABETES MELLITUS WITHOUT COMPLICATION, WITHOUT LONG-TERM CURRENT USE OF INSULIN: ICD-10-CM

## 2025-06-03 NOTE — Clinical Note
To provider, please attest CGM interpretation. Blood sugars are doing very well, reduced metformin given borderline renal function and pt's desire to reduce medications. Now tolerating Mounjaro well w/ minimal to no side effects.

## 2025-06-03 NOTE — PATIENT INSTRUCTIONS
Decrease Metformin to 1000mg once daily  Continue all other medications  If you have any questions or concerns, call me at: [phone]

## 2025-07-17 PROBLEM — Z86.39 HISTORY OF TYPE 2 DIABETES MELLITUS: Status: ACTIVE | Noted: 2025-07-17

## 2025-07-21 ENCOUNTER — APPOINTMENT (OUTPATIENT)
Dept: PRIMARY CARE | Facility: CLINIC | Age: 70
End: 2025-07-21
Payer: MEDICARE

## 2025-07-21 VITALS
BODY MASS INDEX: 25.61 KG/M2 | SYSTOLIC BLOOD PRESSURE: 118 MMHG | WEIGHT: 169 LBS | DIASTOLIC BLOOD PRESSURE: 60 MMHG | HEIGHT: 68 IN | HEART RATE: 88 BPM

## 2025-07-21 DIAGNOSIS — E03.9 HYPOTHYROIDISM, UNSPECIFIED TYPE: ICD-10-CM

## 2025-07-21 DIAGNOSIS — G47.00 INSOMNIA, UNSPECIFIED TYPE: ICD-10-CM

## 2025-07-21 DIAGNOSIS — I10 PRIMARY HYPERTENSION: Primary | ICD-10-CM

## 2025-07-21 DIAGNOSIS — R68.89 FORGETFULNESS: ICD-10-CM

## 2025-07-21 PROCEDURE — 99214 OFFICE O/P EST MOD 30 MIN: CPT | Performed by: FAMILY MEDICINE

## 2025-07-21 PROCEDURE — 4010F ACE/ARB THERAPY RXD/TAKEN: CPT | Performed by: FAMILY MEDICINE

## 2025-07-21 PROCEDURE — 3078F DIAST BP <80 MM HG: CPT | Performed by: FAMILY MEDICINE

## 2025-07-21 PROCEDURE — 3074F SYST BP LT 130 MM HG: CPT | Performed by: FAMILY MEDICINE

## 2025-07-21 PROCEDURE — 3008F BODY MASS INDEX DOCD: CPT | Performed by: FAMILY MEDICINE

## 2025-07-21 PROCEDURE — 1159F MED LIST DOCD IN RCRD: CPT | Performed by: FAMILY MEDICINE

## 2025-07-21 PROCEDURE — 3044F HG A1C LEVEL LT 7.0%: CPT | Performed by: FAMILY MEDICINE

## 2025-07-21 ASSESSMENT — ENCOUNTER SYMPTOMS
CHILLS: 0
SHORTNESS OF BREATH: 0
CHEST TIGHTNESS: 0
DIZZINESS: 0
HEADACHES: 0
FATIGUE: 0

## 2025-07-21 NOTE — PROGRESS NOTES
"Subjective   Patient ID: Dalton Champion is a 70 y.o. male who presents for Follow-up (meds).    Tailbone pain   - reports he has been having some pain in the lower back   - pain is worse when sitting for long periods of time and improves when up and moving    Primary hypertension   - has not been keeping a close eye on his blood pressure at home   - has been working on walking, and trying to lose weight   - has been walking the dog much more often since fostering, and has been getting more exercise   - non-smoker, was a former smoker but quit 30 years ago   - no chest pain, shortness of breath, dizziness or headaches   - has lost 15 pounds since last office visit which he attributes to changes in diabetic medications and eating less     Memory   - reports he has been having occasional issues with forgetting things   - typically will forget small things, like misplacing phone etc   - not forgetting people or places   - has not been getting lost or losing directions   - notices when he is distracted he will lose his place in what he is doing and have more issues          Review of Systems   Constitutional:  Negative for chills and fatigue.   Respiratory:  Negative for chest tightness and shortness of breath.    Neurological:  Negative for dizziness and headaches.       Objective   /60   Pulse 88   Ht 1.727 m (5' 8\")   Wt 76.7 kg (169 lb)   BMI 25.70 kg/m²     Physical Exam  Constitutional:       General: He is not in acute distress.     Appearance: Normal appearance.   HENT:      Mouth/Throat:      Mouth: Mucous membranes are moist.      Pharynx: Oropharynx is clear.     Cardiovascular:      Rate and Rhythm: Normal rate and regular rhythm.   Pulmonary:      Effort: No respiratory distress.      Breath sounds: No stridor. No wheezing.     Neurological:      Mental Status: He is alert.     Psychiatric:         Mood and Affect: Mood normal.         Behavior: Behavior normal.         Assessment/Plan   Problem " List Items Addressed This Visit           ICD-10-CM    Primary hypertension - Primary I10    Stable  - bp at goal today   - continue current regimen   - candasartan daily   - f/u 6 months           Other Visit Diagnoses         Codes      Insomnia, unspecified type     G47.00      Hypothyroidism, unspecified type     E03.9    stable  - recheck thyroid levels   - fu 6 months to monitor symptoms     Relevant Orders    Tsh With Reflex To Free T4 If Abnormal      Forgetfulness     R68.89    stable  - appears age related   - discussed symptomatic monitoring   - consider referral for memory testing if progressing

## 2025-07-23 LAB — TSH SERPL-ACNC: 3.97 MIU/L (ref 0.4–4.5)

## 2025-08-01 DIAGNOSIS — E55.9 VITAMIN D DEFICIENCY: ICD-10-CM

## 2025-08-01 RX ORDER — ERGOCALCIFEROL 1.25 MG/1
50000 CAPSULE ORAL
Qty: 6 CAPSULE | Refills: 2 | Status: SHIPPED | OUTPATIENT
Start: 2025-08-01

## 2025-08-27 ENCOUNTER — APPOINTMENT (OUTPATIENT)
Dept: ENDOCRINOLOGY | Facility: CLINIC | Age: 70
End: 2025-08-27
Payer: MEDICARE

## 2025-08-27 VITALS
BODY MASS INDEX: 26.37 KG/M2 | WEIGHT: 168 LBS | SYSTOLIC BLOOD PRESSURE: 112 MMHG | HEIGHT: 67 IN | DIASTOLIC BLOOD PRESSURE: 60 MMHG

## 2025-08-27 DIAGNOSIS — Z79.4 TYPE 2 DIABETES MELLITUS WITHOUT COMPLICATION, WITH LONG-TERM CURRENT USE OF INSULIN: Primary | ICD-10-CM

## 2025-08-27 DIAGNOSIS — N18.30 STAGE 3 CHRONIC KIDNEY DISEASE, UNSPECIFIED WHETHER STAGE 3A OR 3B CKD (MULTI): ICD-10-CM

## 2025-08-27 DIAGNOSIS — E78.5 HYPERLIPIDEMIA, UNSPECIFIED HYPERLIPIDEMIA TYPE: ICD-10-CM

## 2025-08-27 DIAGNOSIS — E11.9 TYPE 2 DIABETES MELLITUS WITHOUT COMPLICATION, WITH LONG-TERM CURRENT USE OF INSULIN: Primary | ICD-10-CM

## 2025-08-27 LAB
POC FINGERSTICK BLOOD GLUCOSE: 109 MG/DL (ref 70–100)
POC HEMOGLOBIN A1C: 5.6 % (ref 4.2–6.5)

## 2025-08-27 PROCEDURE — 4010F ACE/ARB THERAPY RXD/TAKEN: CPT | Performed by: HOSPITALIST

## 2025-08-27 PROCEDURE — 83036 HEMOGLOBIN GLYCOSYLATED A1C: CPT | Performed by: HOSPITALIST

## 2025-08-27 PROCEDURE — 3044F HG A1C LEVEL LT 7.0%: CPT | Performed by: HOSPITALIST

## 2025-08-27 PROCEDURE — 3008F BODY MASS INDEX DOCD: CPT | Performed by: HOSPITALIST

## 2025-08-27 PROCEDURE — 3074F SYST BP LT 130 MM HG: CPT | Performed by: HOSPITALIST

## 2025-08-27 PROCEDURE — 1159F MED LIST DOCD IN RCRD: CPT | Performed by: HOSPITALIST

## 2025-08-27 PROCEDURE — 99214 OFFICE O/P EST MOD 30 MIN: CPT | Performed by: HOSPITALIST

## 2025-08-27 PROCEDURE — 95251 CONT GLUC MNTR ANALYSIS I&R: CPT | Performed by: HOSPITALIST

## 2025-08-27 PROCEDURE — 82962 GLUCOSE BLOOD TEST: CPT | Performed by: HOSPITALIST

## 2025-08-27 PROCEDURE — 3078F DIAST BP <80 MM HG: CPT | Performed by: HOSPITALIST

## 2025-08-27 RX ORDER — SIMVASTATIN 10 MG/1
10 TABLET, FILM COATED ORAL DAILY
Qty: 90 TABLET | Refills: 3 | Status: SHIPPED | OUTPATIENT
Start: 2025-08-27

## 2025-08-27 ASSESSMENT — ENCOUNTER SYMPTOMS
SORE THROAT: 0
CHEST TIGHTNESS: 0
ABDOMINAL DISTENTION: 0
TREMORS: 0
DYSURIA: 0
PHOTOPHOBIA: 0
NERVOUS/ANXIOUS: 0
SHORTNESS OF BREATH: 0
TROUBLE SWALLOWING: 0
CONSTIPATION: 0
EYE ITCHING: 0
PALPITATIONS: 0
ABDOMINAL PAIN: 0
SLEEP DISTURBANCE: 0
VOMITING: 0
AGITATION: 0
DIARRHEA: 0
ARTHRALGIAS: 0
NAUSEA: 0
HEADACHES: 0
LIGHT-HEADEDNESS: 0
FREQUENCY: 0
CONSTITUTIONAL NEGATIVE: 1
VOICE CHANGE: 0

## 2025-09-02 DIAGNOSIS — E11.9 TYPE 2 DIABETES MELLITUS WITHOUT COMPLICATION, WITHOUT LONG-TERM CURRENT USE OF INSULIN: ICD-10-CM

## 2025-09-02 RX ORDER — SOLIFENACIN SUCCINATE 5 MG/1
5 TABLET, FILM COATED ORAL DAILY
Qty: 90 TABLET | Refills: 3 | Status: SHIPPED | OUTPATIENT
Start: 2025-09-02

## 2025-09-18 ENCOUNTER — APPOINTMENT (OUTPATIENT)
Dept: NEPHROLOGY | Facility: CLINIC | Age: 70
End: 2025-09-18
Payer: MEDICARE

## 2025-12-10 ENCOUNTER — APPOINTMENT (OUTPATIENT)
Dept: ENDOCRINOLOGY | Facility: CLINIC | Age: 70
End: 2025-12-10
Payer: MEDICARE

## 2026-01-21 ENCOUNTER — APPOINTMENT (OUTPATIENT)
Dept: PRIMARY CARE | Facility: CLINIC | Age: 71
End: 2026-01-21
Payer: MEDICARE

## 2026-04-07 ENCOUNTER — APPOINTMENT (OUTPATIENT)
Dept: ENDOCRINOLOGY | Facility: CLINIC | Age: 71
End: 2026-04-07
Payer: MEDICARE

## 2026-08-19 ENCOUNTER — APPOINTMENT (OUTPATIENT)
Dept: ENDOCRINOLOGY | Facility: CLINIC | Age: 71
End: 2026-08-19
Payer: MEDICARE

## (undated) DEVICE — SUTURE, MONOCRYL, 3-0, PS-1 27IN, UNDYED

## (undated) DEVICE — BANDAGE, COMPRESSION, W/CLIP, FLEX-MASTER, DOUBLE LENGTH, 6 IN X 11 YD, LF

## (undated) DEVICE — NEEDLE, HYPODERMIC, SPECIALTY, REGULAR WALL, SHORT BEVEL, 18 G X 1.5 IN

## (undated) DEVICE — GLOVE, SURGICAL, PROTEXIS PI BLUE W/NEUTHERA, 7.5, PF, LF

## (undated) DEVICE — GOWN, SURGICAL, NON-REINFORCED, XLARGE, LF

## (undated) DEVICE — GLOVE, SURGICAL, PROTEXIS PI W/NEU-THERA, 8.0, PF, LF

## (undated) DEVICE — TIP, SUCTION, YANKAUER, FLEXIBLE

## (undated) DEVICE — MARKER, SKIN, DUAL TIP, W/RULER AND LABEL

## (undated) DEVICE — TISSUE ADHESIVE, PREMIERPRO EXOFIN, PRECISION PEN HV, 1.0ML

## (undated) DEVICE — Device

## (undated) DEVICE — TOWEL PACK, STERILE, 4/PACK, BLUE

## (undated) DEVICE — GLOVE, SURGICAL, PROTEXIS PI , 7.5, PF, LF

## (undated) DEVICE — DRESSING, MEPILEX BORDER, POST-OP AG, 4 X 10 IN

## (undated) DEVICE — SOLUTION, IRRIGATION, USP, SODIUM CHLORIDE 0.9%, 3000 ML, BAG

## (undated) DEVICE — HOOD, STERISHIELD T4 SYSTEM

## (undated) DEVICE — SUTURE, VICRYL, 2-0, 36 IN, CT-1, UNDYED

## (undated) DEVICE — PAD, KNEE PATIENT, DEMAYO, DISP, STERILE

## (undated) DEVICE — SUTURE, STRATAFIX, 1 SYMMETRIC, PDS PLUS, 60CM, CTX, VIOLET

## (undated) DEVICE — DRAPE, INSTRUMENT, W/POUCH, STERI DRAPE, 7 X 11 IN, DISPOSABLE, STERILE

## (undated) DEVICE — SYRINGE, 60 CC, LUER LOCK, MONOJECT, W/O CAP, LF

## (undated) DEVICE — BLADE, STRYKER, OSC, 19 X 90 X 1.27G

## (undated) DEVICE — MIXER, CEMENT, MIXEVAC III HIGH VACUUM KIT, STERILE

## (undated) DEVICE — WOUND SYSTEM, DEBRIDEMENT & CLEANING, O.R DUOPAK

## (undated) DEVICE — SUTURE, VICRYL, 1, 27 IN, CT-1 CR, UNDYED

## (undated) DEVICE — SOLUTION, IRRIGATION, STERILE WATER, 1000 ML, POUR BOTTLE